# Patient Record
Sex: MALE | Race: OTHER | HISPANIC OR LATINO
[De-identification: names, ages, dates, MRNs, and addresses within clinical notes are randomized per-mention and may not be internally consistent; named-entity substitution may affect disease eponyms.]

---

## 2017-06-06 ENCOUNTER — APPOINTMENT (OUTPATIENT)
Dept: HEART AND VASCULAR | Facility: CLINIC | Age: 47
End: 2017-06-06

## 2017-06-06 VITALS
HEART RATE: 81 BPM | WEIGHT: 214.73 LBS | HEIGHT: 68.9 IN | SYSTOLIC BLOOD PRESSURE: 144 MMHG | BODY MASS INDEX: 31.8 KG/M2 | TEMPERATURE: 98.1 F | OXYGEN SATURATION: 99 % | DIASTOLIC BLOOD PRESSURE: 86 MMHG

## 2017-06-06 DIAGNOSIS — Z86.39 PERSONAL HISTORY OF OTHER ENDOCRINE, NUTRITIONAL AND METABOLIC DISEASE: ICD-10-CM

## 2017-06-06 DIAGNOSIS — Z78.9 OTHER SPECIFIED HEALTH STATUS: ICD-10-CM

## 2017-06-14 ENCOUNTER — APPOINTMENT (OUTPATIENT)
Dept: HEART AND VASCULAR | Facility: CLINIC | Age: 47
End: 2017-06-14

## 2017-06-14 DIAGNOSIS — R94.31 ABNORMAL ELECTROCARDIOGRAM [ECG] [EKG]: ICD-10-CM

## 2017-06-29 ENCOUNTER — FORM ENCOUNTER (OUTPATIENT)
Age: 47
End: 2017-06-29

## 2017-06-30 ENCOUNTER — OUTPATIENT (OUTPATIENT)
Dept: OUTPATIENT SERVICES | Facility: HOSPITAL | Age: 47
LOS: 1 days | End: 2017-06-30
Payer: COMMERCIAL

## 2017-06-30 DIAGNOSIS — R94.31 ABNORMAL ELECTROCARDIOGRAM [ECG] [EKG]: ICD-10-CM

## 2017-06-30 PROCEDURE — 93306 TTE W/DOPPLER COMPLETE: CPT

## 2017-06-30 PROCEDURE — 93306 TTE W/DOPPLER COMPLETE: CPT | Mod: 26

## 2018-08-08 ENCOUNTER — APPOINTMENT (OUTPATIENT)
Dept: NEPHROLOGY | Facility: CLINIC | Age: 48
End: 2018-08-08
Payer: COMMERCIAL

## 2018-08-08 VITALS
WEIGHT: 215 LBS | RESPIRATION RATE: 12 BRPM | BODY MASS INDEX: 31.84 KG/M2 | DIASTOLIC BLOOD PRESSURE: 80 MMHG | SYSTOLIC BLOOD PRESSURE: 145 MMHG | HEART RATE: 80 BPM

## 2018-08-08 PROCEDURE — 99245 OFF/OP CONSLTJ NEW/EST HI 55: CPT

## 2018-09-03 ENCOUNTER — EMERGENCY (EMERGENCY)
Facility: HOSPITAL | Age: 48
LOS: 1 days | Discharge: HOME | End: 2018-09-03
Attending: EMERGENCY MEDICINE | Admitting: EMERGENCY MEDICINE
Payer: COMMERCIAL

## 2018-09-03 VITALS
HEART RATE: 87 BPM | DIASTOLIC BLOOD PRESSURE: 93 MMHG | WEIGHT: 209 LBS | RESPIRATION RATE: 178 BRPM | TEMPERATURE: 99 F | OXYGEN SATURATION: 96 % | SYSTOLIC BLOOD PRESSURE: 132 MMHG

## 2018-09-03 LAB — S PYO AG SPEC QL IA: NEGATIVE — SIGNIFICANT CHANGE UP

## 2018-09-03 PROCEDURE — 99283 EMERGENCY DEPT VISIT LOW MDM: CPT

## 2018-09-03 PROCEDURE — 87081 CULTURE SCREEN ONLY: CPT

## 2018-09-03 PROCEDURE — 82962 GLUCOSE BLOOD TEST: CPT

## 2018-09-03 PROCEDURE — 87880 STREP A ASSAY W/OPTIC: CPT

## 2018-09-03 RX ORDER — IBUPROFEN 200 MG
600 TABLET ORAL ONCE
Qty: 0 | Refills: 0 | Status: COMPLETED | OUTPATIENT
Start: 2018-09-03 | End: 2018-09-03

## 2018-09-03 RX ORDER — DEXAMETHASONE 0.5 MG/5ML
8 ELIXIR ORAL ONCE
Qty: 0 | Refills: 0 | Status: COMPLETED | OUTPATIENT
Start: 2018-09-03 | End: 2018-09-03

## 2018-09-03 RX ORDER — IBUPROFEN 200 MG
1 TABLET ORAL
Qty: 30 | Refills: 0 | OUTPATIENT
Start: 2018-09-03

## 2018-09-03 RX ADMIN — Medication 8 MILLIGRAM(S): at 14:55

## 2018-09-03 RX ADMIN — Medication 100 MILLIGRAM(S): at 14:53

## 2018-09-03 RX ADMIN — Medication 600 MILLIGRAM(S): at 14:55

## 2018-09-03 NOTE — ED PROVIDER NOTE - ENMT, MLM
Airway patent, Nasal mucosa clear. Mouth with normal mucosa. Throat has no vesicles, no oropharyngeal exudates and uvula is midline.  + posterior pharynx erythema.  no lad

## 2018-09-03 NOTE — ED PROVIDER NOTE - OBJECTIVE STATEMENT
here with sore throat, body aches cough for past 5 days.  Went to urgent care and was told it was viral and to rest which he has been doing but not feeling better.  Was not given any prescriptions so hasn't tried anything for symptoms.  Associated with subjective fever/chills.  CXR at urgent care reported as negative

## 2018-09-03 NOTE — ED ADULT NURSE NOTE - NSIMPLEMENTINTERV_GEN_ALL_ED
Implemented All Universal Safety Interventions:  [place] to call system. Call bell, personal items and telephone within reach. Instruct patient to call for assistance. Room bathroom lighting operational. Non-slip footwear when patient is off stretcher. Physically safe environment: no spills, clutter or unnecessary equipment. Stretcher in lowest position, wheels locked, appropriate side rails in place.

## 2018-09-03 NOTE — ED PROVIDER NOTE - MEDICAL DECISION MAKING DETAILS
suspect viral process.  rapid strep neg.  recent cxr neg for pneumonia.  plan symptomatic treatment with ibuprofen/tessalon/decadron.  accucheck wnl making dka unlikely

## 2018-09-03 NOTE — ED ADULT NURSE NOTE - OBJECTIVE STATEMENT
47 y/o male c/o generalized body aches, intermittent cough, sore throat, feeling warm since last week. pt states " my coworker was sick last week and I think I have the same thing". pt reports being at [facility] 2 days ago, x-ray was clear and was told he had a URI, but " he still feel bad".

## 2018-09-05 NOTE — ED POST DISCHARGE NOTE - RESULT SUMMARY
throat cx + strep, discussed with pt reporting difficulty swallowing, persistent throat pain, given rx augmentin

## 2018-09-07 DIAGNOSIS — J02.9 ACUTE PHARYNGITIS, UNSPECIFIED: ICD-10-CM

## 2018-09-07 DIAGNOSIS — Z79.899 OTHER LONG TERM (CURRENT) DRUG THERAPY: ICD-10-CM

## 2018-09-07 DIAGNOSIS — E11.9 TYPE 2 DIABETES MELLITUS WITHOUT COMPLICATIONS: ICD-10-CM

## 2018-09-07 DIAGNOSIS — Z79.82 LONG TERM (CURRENT) USE OF ASPIRIN: ICD-10-CM

## 2018-09-07 DIAGNOSIS — I10 ESSENTIAL (PRIMARY) HYPERTENSION: ICD-10-CM

## 2019-05-06 ENCOUNTER — INPATIENT (INPATIENT)
Facility: HOSPITAL | Age: 49
LOS: 2 days | Discharge: HOME | DRG: 281 | End: 2019-05-09
Attending: INTERNAL MEDICINE | Admitting: INTERNAL MEDICINE
Payer: COMMERCIAL

## 2019-05-06 VITALS
WEIGHT: 205.03 LBS | DIASTOLIC BLOOD PRESSURE: 108 MMHG | SYSTOLIC BLOOD PRESSURE: 219 MMHG | OXYGEN SATURATION: 95 % | RESPIRATION RATE: 18 BRPM | HEART RATE: 83 BPM | TEMPERATURE: 98 F

## 2019-05-06 DIAGNOSIS — E11.9 TYPE 2 DIABETES MELLITUS WITHOUT COMPLICATIONS: ICD-10-CM

## 2019-05-06 DIAGNOSIS — E83.42 HYPOMAGNESEMIA: ICD-10-CM

## 2019-05-06 DIAGNOSIS — E13.9 OTHER SPECIFIED DIABETES MELLITUS WITHOUT COMPLICATIONS: ICD-10-CM

## 2019-05-06 DIAGNOSIS — I16.1 HYPERTENSIVE EMERGENCY: ICD-10-CM

## 2019-05-06 DIAGNOSIS — Z90.49 ACQUIRED ABSENCE OF OTHER SPECIFIED PARTS OF DIGESTIVE TRACT: Chronic | ICD-10-CM

## 2019-05-06 DIAGNOSIS — E78.5 HYPERLIPIDEMIA, UNSPECIFIED: ICD-10-CM

## 2019-05-06 DIAGNOSIS — I21.4 NON-ST ELEVATION (NSTEMI) MYOCARDIAL INFARCTION: ICD-10-CM

## 2019-05-06 DIAGNOSIS — E87.6 HYPOKALEMIA: ICD-10-CM

## 2019-05-06 DIAGNOSIS — N18.3 CHRONIC KIDNEY DISEASE, STAGE 3 (MODERATE): ICD-10-CM

## 2019-05-06 DIAGNOSIS — N17.9 ACUTE KIDNEY FAILURE, UNSPECIFIED: ICD-10-CM

## 2019-05-06 DIAGNOSIS — I10 ESSENTIAL (PRIMARY) HYPERTENSION: ICD-10-CM

## 2019-05-06 LAB
ALBUMIN SERPL ELPH-MCNC: 3.6 G/DL — SIGNIFICANT CHANGE UP (ref 3.3–5)
ALBUMIN SERPL ELPH-MCNC: 3.6 G/DL — SIGNIFICANT CHANGE UP (ref 3.3–5)
ALP SERPL-CCNC: 87 U/L — SIGNIFICANT CHANGE UP (ref 40–120)
ALP SERPL-CCNC: 91 U/L — SIGNIFICANT CHANGE UP (ref 40–120)
ALT FLD-CCNC: 12 U/L — SIGNIFICANT CHANGE UP (ref 10–45)
ALT FLD-CCNC: 13 U/L — SIGNIFICANT CHANGE UP (ref 10–45)
ANION GAP SERPL CALC-SCNC: 12 MMOL/L — SIGNIFICANT CHANGE UP (ref 5–17)
ANION GAP SERPL CALC-SCNC: 13 MMOL/L — SIGNIFICANT CHANGE UP (ref 5–17)
APPEARANCE UR: CLEAR — SIGNIFICANT CHANGE UP
APTT BLD: 29.9 SEC — SIGNIFICANT CHANGE UP (ref 27.5–36.3)
APTT BLD: 58.8 SEC — HIGH (ref 27.5–36.3)
APTT BLD: 65.1 SEC — HIGH (ref 27.5–36.3)
AST SERPL-CCNC: 14 U/L — SIGNIFICANT CHANGE UP (ref 10–40)
AST SERPL-CCNC: 15 U/L — SIGNIFICANT CHANGE UP (ref 10–40)
BASOPHILS # BLD AUTO: 0.05 K/UL — SIGNIFICANT CHANGE UP (ref 0–0.2)
BASOPHILS # BLD AUTO: 0.07 K/UL — SIGNIFICANT CHANGE UP (ref 0–0.2)
BASOPHILS NFR BLD AUTO: 0.7 % — SIGNIFICANT CHANGE UP (ref 0–2)
BASOPHILS NFR BLD AUTO: 0.8 % — SIGNIFICANT CHANGE UP (ref 0–2)
BILIRUB SERPL-MCNC: 0.4 MG/DL — SIGNIFICANT CHANGE UP (ref 0.2–1.2)
BILIRUB SERPL-MCNC: 0.4 MG/DL — SIGNIFICANT CHANGE UP (ref 0.2–1.2)
BILIRUB UR-MCNC: NEGATIVE — SIGNIFICANT CHANGE UP
BUN SERPL-MCNC: 26 MG/DL — HIGH (ref 7–23)
BUN SERPL-MCNC: 26 MG/DL — HIGH (ref 7–23)
CALCIUM SERPL-MCNC: 8.8 MG/DL — SIGNIFICANT CHANGE UP (ref 8.4–10.5)
CALCIUM SERPL-MCNC: 9.1 MG/DL — SIGNIFICANT CHANGE UP (ref 8.4–10.5)
CHLORIDE SERPL-SCNC: 104 MMOL/L — SIGNIFICANT CHANGE UP (ref 96–108)
CHLORIDE SERPL-SCNC: 105 MMOL/L — SIGNIFICANT CHANGE UP (ref 96–108)
CHOLEST SERPL-MCNC: 180 MG/DL — SIGNIFICANT CHANGE UP (ref 10–199)
CK MB CFR SERPL CALC: 5 NG/ML — SIGNIFICANT CHANGE UP (ref 0–6.7)
CK SERPL-CCNC: 259 U/L — HIGH (ref 30–200)
CK SERPL-CCNC: 284 U/L — HIGH (ref 30–200)
CO2 SERPL-SCNC: 28 MMOL/L — SIGNIFICANT CHANGE UP (ref 22–31)
CO2 SERPL-SCNC: 29 MMOL/L — SIGNIFICANT CHANGE UP (ref 22–31)
COLOR SPEC: YELLOW — SIGNIFICANT CHANGE UP
CREAT ?TM UR-MCNC: 47 MG/DL — SIGNIFICANT CHANGE UP
CREAT SERPL-MCNC: 2.18 MG/DL — HIGH (ref 0.5–1.3)
CREAT SERPL-MCNC: 2.21 MG/DL — HIGH (ref 0.5–1.3)
DIFF PNL FLD: ABNORMAL
EOSINOPHIL # BLD AUTO: 0.14 K/UL — SIGNIFICANT CHANGE UP (ref 0–0.5)
EOSINOPHIL # BLD AUTO: 0.15 K/UL — SIGNIFICANT CHANGE UP (ref 0–0.5)
EOSINOPHIL NFR BLD AUTO: 1.7 % — SIGNIFICANT CHANGE UP (ref 0–6)
EOSINOPHIL NFR BLD AUTO: 1.9 % — SIGNIFICANT CHANGE UP (ref 0–6)
GLUCOSE BLDC GLUCOMTR-MCNC: 128 MG/DL — HIGH (ref 70–99)
GLUCOSE BLDC GLUCOMTR-MCNC: 136 MG/DL — HIGH (ref 70–99)
GLUCOSE BLDC GLUCOMTR-MCNC: 182 MG/DL — HIGH (ref 70–99)
GLUCOSE BLDC GLUCOMTR-MCNC: 212 MG/DL — HIGH (ref 70–99)
GLUCOSE SERPL-MCNC: 144 MG/DL — HIGH (ref 70–99)
GLUCOSE SERPL-MCNC: 183 MG/DL — HIGH (ref 70–99)
GLUCOSE UR QL: NEGATIVE — SIGNIFICANT CHANGE UP
HBA1C BLD-MCNC: 7.2 % — HIGH (ref 4–5.6)
HCT VFR BLD CALC: 34.8 % — LOW (ref 39–50)
HCT VFR BLD CALC: 35.3 % — LOW (ref 39–50)
HDLC SERPL-MCNC: 38 MG/DL — LOW
HGB BLD-MCNC: 12 G/DL — LOW (ref 13–17)
HGB BLD-MCNC: 12.3 G/DL — LOW (ref 13–17)
IMM GRANULOCYTES NFR BLD AUTO: 0.2 % — SIGNIFICANT CHANGE UP (ref 0–1.5)
IMM GRANULOCYTES NFR BLD AUTO: 0.3 % — SIGNIFICANT CHANGE UP (ref 0–1.5)
INR BLD: 1 — SIGNIFICANT CHANGE UP (ref 0.88–1.16)
INR BLD: 1.03 — SIGNIFICANT CHANGE UP (ref 0.88–1.16)
KETONES UR-MCNC: NEGATIVE — SIGNIFICANT CHANGE UP
LEUKOCYTE ESTERASE UR-ACNC: NEGATIVE — SIGNIFICANT CHANGE UP
LIPID PNL WITH DIRECT LDL SERPL: 106 MG/DL — SIGNIFICANT CHANGE UP
LYMPHOCYTES # BLD AUTO: 1.34 K/UL — SIGNIFICANT CHANGE UP (ref 1–3.3)
LYMPHOCYTES # BLD AUTO: 18.3 % — SIGNIFICANT CHANGE UP (ref 13–44)
LYMPHOCYTES # BLD AUTO: 2.85 K/UL — SIGNIFICANT CHANGE UP (ref 1–3.3)
LYMPHOCYTES # BLD AUTO: 32 % — SIGNIFICANT CHANGE UP (ref 13–44)
MAGNESIUM SERPL-MCNC: 1.5 MG/DL — LOW (ref 1.6–2.6)
MCHC RBC-ENTMCNC: 30.4 PG — SIGNIFICANT CHANGE UP (ref 27–34)
MCHC RBC-ENTMCNC: 30.5 PG — SIGNIFICANT CHANGE UP (ref 27–34)
MCHC RBC-ENTMCNC: 34.5 GM/DL — SIGNIFICANT CHANGE UP (ref 32–36)
MCHC RBC-ENTMCNC: 34.8 GM/DL — SIGNIFICANT CHANGE UP (ref 32–36)
MCV RBC AUTO: 87.6 FL — SIGNIFICANT CHANGE UP (ref 80–100)
MCV RBC AUTO: 88.1 FL — SIGNIFICANT CHANGE UP (ref 80–100)
MONOCYTES # BLD AUTO: 0.72 K/UL — SIGNIFICANT CHANGE UP (ref 0–0.9)
MONOCYTES # BLD AUTO: 0.78 K/UL — SIGNIFICANT CHANGE UP (ref 0–0.9)
MONOCYTES NFR BLD AUTO: 10.7 % — SIGNIFICANT CHANGE UP (ref 2–14)
MONOCYTES NFR BLD AUTO: 8.1 % — SIGNIFICANT CHANGE UP (ref 2–14)
NEUTROPHILS # BLD AUTO: 4.99 K/UL — SIGNIFICANT CHANGE UP (ref 1.8–7.4)
NEUTROPHILS # BLD AUTO: 5.09 K/UL — SIGNIFICANT CHANGE UP (ref 1.8–7.4)
NEUTROPHILS NFR BLD AUTO: 57.2 % — SIGNIFICANT CHANGE UP (ref 43–77)
NEUTROPHILS NFR BLD AUTO: 68.1 % — SIGNIFICANT CHANGE UP (ref 43–77)
NITRITE UR-MCNC: NEGATIVE — SIGNIFICANT CHANGE UP
NRBC # BLD: 0 /100 WBCS — SIGNIFICANT CHANGE UP (ref 0–0)
NRBC # BLD: 0 /100 WBCS — SIGNIFICANT CHANGE UP (ref 0–0)
NT-PROBNP SERPL-SCNC: 1651 PG/ML — HIGH (ref 0–300)
OSMOLALITY UR: 410 MOSMOL/KG — SIGNIFICANT CHANGE UP (ref 100–650)
PH UR: 7 — SIGNIFICANT CHANGE UP (ref 5–8)
PHOSPHATE SERPL-MCNC: 3.4 MG/DL — SIGNIFICANT CHANGE UP (ref 2.5–4.5)
PLATELET # BLD AUTO: 209 K/UL — SIGNIFICANT CHANGE UP (ref 150–400)
PLATELET # BLD AUTO: 215 K/UL — SIGNIFICANT CHANGE UP (ref 150–400)
POTASSIUM SERPL-MCNC: 3.6 MMOL/L — SIGNIFICANT CHANGE UP (ref 3.5–5.3)
POTASSIUM SERPL-MCNC: 4 MMOL/L — SIGNIFICANT CHANGE UP (ref 3.5–5.3)
POTASSIUM SERPL-SCNC: 3.6 MMOL/L — SIGNIFICANT CHANGE UP (ref 3.5–5.3)
POTASSIUM SERPL-SCNC: 4 MMOL/L — SIGNIFICANT CHANGE UP (ref 3.5–5.3)
PROT ?TM UR-MCNC: 213 MG/DL — HIGH (ref 0–12)
PROT SERPL-MCNC: 6.6 G/DL — SIGNIFICANT CHANGE UP (ref 6–8.3)
PROT SERPL-MCNC: 6.7 G/DL — SIGNIFICANT CHANGE UP (ref 6–8.3)
PROT UR-MCNC: 100 MG/DL
PROTHROM AB SERPL-ACNC: 11.3 SEC — SIGNIFICANT CHANGE UP (ref 10–12.9)
PROTHROM AB SERPL-ACNC: 11.7 SEC — SIGNIFICANT CHANGE UP (ref 10–12.9)
RBC # BLD: 3.95 M/UL — LOW (ref 4.2–5.8)
RBC # BLD: 4.03 M/UL — LOW (ref 4.2–5.8)
RBC # FLD: 12.4 % — SIGNIFICANT CHANGE UP (ref 10.3–14.5)
RBC # FLD: 12.5 % — SIGNIFICANT CHANGE UP (ref 10.3–14.5)
SODIUM SERPL-SCNC: 145 MMOL/L — SIGNIFICANT CHANGE UP (ref 135–145)
SODIUM SERPL-SCNC: 146 MMOL/L — HIGH (ref 135–145)
SODIUM UR-SCNC: 144 MMOL/L — SIGNIFICANT CHANGE UP
SP GR SPEC: 1.02 — SIGNIFICANT CHANGE UP (ref 1–1.03)
TOTAL CHOLESTEROL/HDL RATIO MEASUREMENT: 4.7 RATIO — SIGNIFICANT CHANGE UP (ref 3.4–9.6)
TRIGL SERPL-MCNC: 178 MG/DL — HIGH (ref 10–149)
TROPONIN T SERPL-MCNC: 0.05 NG/ML — CRITICAL HIGH (ref 0–0.01)
TROPONIN T SERPL-MCNC: 0.06 NG/ML — CRITICAL HIGH (ref 0–0.01)
UROBILINOGEN FLD QL: 0.2 E.U./DL — SIGNIFICANT CHANGE UP
UUN UR-MCNC: 300 MG/DL — SIGNIFICANT CHANGE UP
WBC # BLD: 7.32 K/UL — SIGNIFICANT CHANGE UP (ref 3.8–10.5)
WBC # BLD: 8.9 K/UL — SIGNIFICANT CHANGE UP (ref 3.8–10.5)
WBC # FLD AUTO: 7.32 K/UL — SIGNIFICANT CHANGE UP (ref 3.8–10.5)
WBC # FLD AUTO: 8.9 K/UL — SIGNIFICANT CHANGE UP (ref 3.8–10.5)

## 2019-05-06 PROCEDURE — 93010 ELECTROCARDIOGRAM REPORT: CPT

## 2019-05-06 PROCEDURE — 99291 CRITICAL CARE FIRST HOUR: CPT

## 2019-05-06 PROCEDURE — 99223 1ST HOSP IP/OBS HIGH 75: CPT | Mod: AI

## 2019-05-06 PROCEDURE — 71045 X-RAY EXAM CHEST 1 VIEW: CPT | Mod: 26

## 2019-05-06 RX ORDER — TRIAMTERENE/HYDROCHLOROTHIAZID 75 MG-50MG
1 TABLET ORAL DAILY
Qty: 0 | Refills: 0 | Status: DISCONTINUED | OUTPATIENT
Start: 2019-05-06 | End: 2019-05-06

## 2019-05-06 RX ORDER — LABETALOL HCL 100 MG
10 TABLET ORAL ONCE
Qty: 0 | Refills: 0 | Status: COMPLETED | OUTPATIENT
Start: 2019-05-06 | End: 2019-05-06

## 2019-05-06 RX ORDER — NITROGLYCERIN 6.5 MG
10 CAPSULE, EXTENDED RELEASE ORAL
Qty: 50 | Refills: 0 | Status: DISCONTINUED | OUTPATIENT
Start: 2019-05-06 | End: 2019-05-06

## 2019-05-06 RX ORDER — VALSARTAN 80 MG/1
0 TABLET ORAL
Qty: 0 | Refills: 0 | COMMUNITY

## 2019-05-06 RX ORDER — METOPROLOL TARTRATE 50 MG
5 TABLET ORAL ONCE
Qty: 0 | Refills: 0 | Status: COMPLETED | OUTPATIENT
Start: 2019-05-06 | End: 2019-05-06

## 2019-05-06 RX ORDER — DEXTROSE 50 % IN WATER 50 %
15 SYRINGE (ML) INTRAVENOUS ONCE
Qty: 0 | Refills: 0 | Status: DISCONTINUED | OUTPATIENT
Start: 2019-05-06 | End: 2019-05-09

## 2019-05-06 RX ORDER — HYDRALAZINE HCL 50 MG
10 TABLET ORAL ONCE
Qty: 0 | Refills: 0 | Status: COMPLETED | OUTPATIENT
Start: 2019-05-06 | End: 2019-05-06

## 2019-05-06 RX ORDER — HEPARIN SODIUM 5000 [USP'U]/ML
5600 INJECTION INTRAVENOUS; SUBCUTANEOUS EVERY 6 HOURS
Qty: 0 | Refills: 0 | Status: DISCONTINUED | OUTPATIENT
Start: 2019-05-06 | End: 2019-05-09

## 2019-05-06 RX ORDER — AMLODIPINE BESYLATE 2.5 MG/1
0 TABLET ORAL
Qty: 0 | Refills: 0 | COMMUNITY

## 2019-05-06 RX ORDER — SODIUM CHLORIDE 9 MG/ML
1000 INJECTION, SOLUTION INTRAVENOUS
Qty: 0 | Refills: 0 | Status: DISCONTINUED | OUTPATIENT
Start: 2019-05-06 | End: 2019-05-09

## 2019-05-06 RX ORDER — HEPARIN SODIUM 5000 [USP'U]/ML
INJECTION INTRAVENOUS; SUBCUTANEOUS
Qty: 25000 | Refills: 0 | Status: DISCONTINUED | OUTPATIENT
Start: 2019-05-06 | End: 2019-05-07

## 2019-05-06 RX ORDER — DEXTROSE 50 % IN WATER 50 %
25 SYRINGE (ML) INTRAVENOUS ONCE
Qty: 0 | Refills: 0 | Status: DISCONTINUED | OUTPATIENT
Start: 2019-05-06 | End: 2019-05-09

## 2019-05-06 RX ORDER — AMLODIPINE BESYLATE 2.5 MG/1
5 TABLET ORAL DAILY
Qty: 0 | Refills: 0 | Status: DISCONTINUED | OUTPATIENT
Start: 2019-05-06 | End: 2019-05-06

## 2019-05-06 RX ORDER — ISOSORBIDE MONONITRATE 60 MG/1
60 TABLET, EXTENDED RELEASE ORAL DAILY
Qty: 0 | Refills: 0 | Status: DISCONTINUED | OUTPATIENT
Start: 2019-05-06 | End: 2019-05-07

## 2019-05-06 RX ORDER — ASPIRIN/CALCIUM CARB/MAGNESIUM 324 MG
325 TABLET ORAL ONCE
Qty: 0 | Refills: 0 | Status: COMPLETED | OUTPATIENT
Start: 2019-05-06 | End: 2019-05-06

## 2019-05-06 RX ORDER — INSULIN LISPRO 100/ML
VIAL (ML) SUBCUTANEOUS
Qty: 0 | Refills: 0 | Status: DISCONTINUED | OUTPATIENT
Start: 2019-05-06 | End: 2019-05-09

## 2019-05-06 RX ORDER — NITROGLYCERIN 6.5 MG
0.4 CAPSULE, EXTENDED RELEASE ORAL ONCE
Qty: 0 | Refills: 0 | Status: COMPLETED | OUTPATIENT
Start: 2019-05-06 | End: 2019-05-06

## 2019-05-06 RX ORDER — ATORVASTATIN CALCIUM 80 MG/1
80 TABLET, FILM COATED ORAL AT BEDTIME
Qty: 0 | Refills: 0 | Status: DISCONTINUED | OUTPATIENT
Start: 2019-05-06 | End: 2019-05-09

## 2019-05-06 RX ORDER — ASPIRIN/CALCIUM CARB/MAGNESIUM 324 MG
81 TABLET ORAL DAILY
Qty: 0 | Refills: 0 | Status: DISCONTINUED | OUTPATIENT
Start: 2019-05-07 | End: 2019-05-09

## 2019-05-06 RX ORDER — ASPIRIN/CALCIUM CARB/MAGNESIUM 324 MG
1 TABLET ORAL
Qty: 0 | Refills: 0 | COMMUNITY

## 2019-05-06 RX ORDER — ATORVASTATIN CALCIUM 80 MG/1
20 TABLET, FILM COATED ORAL AT BEDTIME
Qty: 0 | Refills: 0 | Status: DISCONTINUED | OUTPATIENT
Start: 2019-05-06 | End: 2019-05-06

## 2019-05-06 RX ORDER — ASPIRIN/CALCIUM CARB/MAGNESIUM 324 MG
81 TABLET ORAL DAILY
Qty: 0 | Refills: 0 | Status: DISCONTINUED | OUTPATIENT
Start: 2019-05-06 | End: 2019-05-06

## 2019-05-06 RX ORDER — HEPARIN SODIUM 5000 [USP'U]/ML
5000 INJECTION INTRAVENOUS; SUBCUTANEOUS ONCE
Qty: 0 | Refills: 0 | Status: COMPLETED | OUTPATIENT
Start: 2019-05-06 | End: 2019-05-06

## 2019-05-06 RX ORDER — AMLODIPINE BESYLATE 2.5 MG/1
10 TABLET ORAL DAILY
Qty: 0 | Refills: 0 | Status: DISCONTINUED | OUTPATIENT
Start: 2019-05-07 | End: 2019-05-09

## 2019-05-06 RX ORDER — MAGNESIUM SULFATE 500 MG/ML
2 VIAL (ML) INJECTION EVERY 4 HOURS
Qty: 0 | Refills: 0 | Status: COMPLETED | OUTPATIENT
Start: 2019-05-06 | End: 2019-05-06

## 2019-05-06 RX ORDER — ATORVASTATIN CALCIUM 80 MG/1
0 TABLET, FILM COATED ORAL
Qty: 0 | Refills: 0 | COMMUNITY

## 2019-05-06 RX ORDER — METOPROLOL TARTRATE 50 MG
25 TABLET ORAL
Qty: 0 | Refills: 0 | Status: DISCONTINUED | OUTPATIENT
Start: 2019-05-06 | End: 2019-05-08

## 2019-05-06 RX ORDER — NEBIVOLOL HYDROCHLORIDE 5 MG/1
0 TABLET ORAL
Qty: 0 | Refills: 0 | COMMUNITY

## 2019-05-06 RX ORDER — DEXTROSE 50 % IN WATER 50 %
12.5 SYRINGE (ML) INTRAVENOUS ONCE
Qty: 0 | Refills: 0 | Status: DISCONTINUED | OUTPATIENT
Start: 2019-05-06 | End: 2019-05-09

## 2019-05-06 RX ORDER — POTASSIUM CHLORIDE 20 MEQ
40 PACKET (EA) ORAL ONCE
Qty: 0 | Refills: 0 | Status: COMPLETED | OUTPATIENT
Start: 2019-05-06 | End: 2019-05-06

## 2019-05-06 RX ORDER — GLUCAGON INJECTION, SOLUTION 0.5 MG/.1ML
1 INJECTION, SOLUTION SUBCUTANEOUS ONCE
Qty: 0 | Refills: 0 | Status: DISCONTINUED | OUTPATIENT
Start: 2019-05-06 | End: 2019-05-09

## 2019-05-06 RX ORDER — ACETAMINOPHEN 500 MG
650 TABLET ORAL ONCE
Qty: 0 | Refills: 0 | Status: COMPLETED | OUTPATIENT
Start: 2019-05-06 | End: 2019-05-06

## 2019-05-06 RX ORDER — AMLODIPINE BESYLATE 2.5 MG/1
5 TABLET ORAL ONCE
Qty: 0 | Refills: 0 | Status: COMPLETED | OUTPATIENT
Start: 2019-05-06 | End: 2019-05-06

## 2019-05-06 RX ADMIN — Medication 2: at 11:48

## 2019-05-06 RX ADMIN — Medication 10 MILLIGRAM(S): at 07:02

## 2019-05-06 RX ADMIN — Medication 325 MILLIGRAM(S): at 03:13

## 2019-05-06 RX ADMIN — HEPARIN SODIUM 1000 UNIT(S)/HR: 5000 INJECTION INTRAVENOUS; SUBCUTANEOUS at 03:39

## 2019-05-06 RX ADMIN — Medication 5 MILLIGRAM(S): at 03:18

## 2019-05-06 RX ADMIN — Medication 50 GRAM(S): at 14:29

## 2019-05-06 RX ADMIN — Medication 650 MILLIGRAM(S): at 19:33

## 2019-05-06 RX ADMIN — ATORVASTATIN CALCIUM 80 MILLIGRAM(S): 80 TABLET, FILM COATED ORAL at 22:10

## 2019-05-06 RX ADMIN — Medication 25 MILLIGRAM(S): at 05:50

## 2019-05-06 RX ADMIN — Medication 50 GRAM(S): at 09:45

## 2019-05-06 RX ADMIN — Medication 40 MILLIEQUIVALENT(S): at 09:46

## 2019-05-06 RX ADMIN — Medication 10 MILLIGRAM(S): at 05:50

## 2019-05-06 RX ADMIN — HEPARIN SODIUM 1000 UNIT(S)/HR: 5000 INJECTION INTRAVENOUS; SUBCUTANEOUS at 10:11

## 2019-05-06 RX ADMIN — Medication 25 MILLIGRAM(S): at 19:34

## 2019-05-06 RX ADMIN — AMLODIPINE BESYLATE 5 MILLIGRAM(S): 2.5 TABLET ORAL at 09:19

## 2019-05-06 RX ADMIN — ISOSORBIDE MONONITRATE 60 MILLIGRAM(S): 60 TABLET, EXTENDED RELEASE ORAL at 11:48

## 2019-05-06 RX ADMIN — Medication 3 MICROGRAM(S)/MIN: at 07:30

## 2019-05-06 RX ADMIN — Medication 0.4 MILLIGRAM(S): at 03:13

## 2019-05-06 RX ADMIN — Medication 4: at 22:08

## 2019-05-06 RX ADMIN — Medication 10 MILLIGRAM(S): at 03:59

## 2019-05-06 RX ADMIN — AMLODIPINE BESYLATE 5 MILLIGRAM(S): 2.5 TABLET ORAL at 07:29

## 2019-05-06 RX ADMIN — HEPARIN SODIUM 5000 UNIT(S): 5000 INJECTION INTRAVENOUS; SUBCUTANEOUS at 03:39

## 2019-05-06 RX ADMIN — Medication 650 MILLIGRAM(S): at 21:36

## 2019-05-06 NOTE — CONSULT NOTE ADULT - SUBJECTIVE AND OBJECTIVE BOX
WILIAN HADLEY      Patient is a 48y old  Male who presents with a chief complaint of Intermittent, non exertional, mild chest pressure with no associated symptoms this evening.  In ER pt. noted to be HTN Emergency 212/108 with Troponin 0.06. (06 May 2019 10:55)      HPI:  47 y/o male with PMHx of HTN, HPL, renal insufficiency (on admission Creatinine 2.21 no other labs to compare; pt. denies hx of CKD) and DM-2 present to [facility] ER this morning, 19, complaining of intermittent, non exertional, mild chest pressure associated with no symptoms last night 11PM.   In ER Pt.'s BP noted to be 212/108 with elevated Troponin 0.06; pt. reports  not taking his medication on Saturday; otherwise he is compliant with his BP medication.       According to patient, he was sitting in his chair watching television around 11PM when he suddenly felt mild chest pressure, 5/10 lasting 2-3 minutes with no associated symptoms.  He was concerned because he never experienced chest pressure before and the feeling took his breath away.    Pt. states he had stress test one year ago which was normal and denies any cardiac history including HF.  Pt. also denies family hx of cardiac disease.       In ER ECG reveals NSR@71bpm with non specific ST-T wave changes, Troponin 0.06, , BNP 1651, BUN/Creatinine 26/2.21, Blood Glucose 183 and H/H 12.0/34.8.  CXR reveals no acute pathology f/u official report.  BP noted to be 212/108; pt. was given Labetalol 10mg IV X1 reducing 's an additional Lopressor 5mg IV push given.  Pt. ruled in NSTEMI in setting HTN Emergency Heparin gtt started and given Aspirin Ec 325mg PO X1.    In light of patient ruling in NSTEMI in setting HTN Emergency, hx and symptoms pt. admitted to [facility] for telemetry, ECG, serial CE, Echocardiogram, NPO for further cardiac workup to rule out ACS and HTN management.  Holding pt.'s Triamterene/HCTz (37.5-25mg) in setting of elevated CK  2.21.  pt. denies hx of CKD.  Discuss further with Dr. Chicas (06 May 2019 05:31)      Addl  Medical issues:       HEALTH ISSUES - PROBLEM Dx:  Chronic kidney disease (CKD), stage III (moderate): Chronic kidney disease (CKD), stage III (moderate)  Hypomagnesemia: Hypomagnesemia  Hypokalemia: Hypokalemia  Hyperlipidemia: Hyperlipidemia  Diabetes: Diabetes  Hypertension: Hypertension  NSTEMI (non-ST elevated myocardial infarction): NSTEMI (non-ST elevated myocardial infarction)  Acute kidney injury: Acute kidney injury  Hypertensive emergency: Hypertensive emergency            MEDICATIONS  (STANDING):  atorvastatin 80 milliGRAM(s) Oral at bedtime  dextrose 5%. 1000 milliLiter(s) (50 mL/Hr) IV Continuous <Continuous>  dextrose 50% Injectable 12.5 Gram(s) IV Push once  dextrose 50% Injectable 25 Gram(s) IV Push once  dextrose 50% Injectable 25 Gram(s) IV Push once  heparin  Infusion.  Unit(s)/Hr (10 mL/Hr) IV Continuous <Continuous>  insulin lispro (HumaLOG) corrective regimen sliding scale   SubCutaneous Before meals and at bedtime  isosorbide   mononitrate ER Tablet (IMDUR) 60 milliGRAM(s) Oral daily  metoprolol tartrate 25 milliGRAM(s) Oral two times a day    MEDICATIONS  (PRN):  dextrose 40% Gel 15 Gram(s) Oral once PRN Blood Glucose LESS THAN 70 milliGRAM(s)/deciliter  glucagon  Injectable 1 milliGRAM(s) IntraMuscular once PRN Glucose LESS THAN 70 milligrams/deciliter  heparin  Injectable 5600 Unit(s) IV Push every 6 hours PRN For aPTT less than 40          PAST MEDICAL & SURGICAL HISTORY:  Hyperlipidemia  Diabetes  Hypertension  History of cholecystectomy      REVIEW OF SYSTEMS:  [x] As per HPI          Reviewed   no change                            Changes noted  CONSTITUTIONAL: No fever, weight loss, or fatigue  RESPIRATORY: No cough, wheezing, chills or hemoptysis; No Shortness of Breath  CARDIOVASCULAR: palpitations, dizziness, or leg swelling--CP  GASTROINTESTINAL: No abdominal or epigastric pain. No nausea, vomiting, or hematemesis; No diarrhea or constipation. No melena or hematochezia.  MUSCULOSKELETAL: No joint pain or swelling; No muscle, back, or extremity pain  Neuro:   Grossly  Negative  Psych        Awake  alert  [x] All others negative	  [ ] Unable to obtain      Vital Signs Last 24 Hrs  T(C): 36.7 (06 May 2019 20:55), Max: 36.7 (06 May 2019 01:38)  T(F): 98 (06 May 2019 20:55), Max: 98.1 (06 May 2019 01:38)  HR: 76 (06 May 2019 20:22) (71 - 85)  BP: 164/88 (06 May 2019 20:22) (163/88 - 219/108)  BP(mean): --  RR: 18 (06 May 2019 20:22) (18 - 18)  SpO2: 97% (06 May 2019 20:22) (95% - 98%)    PHYSICAL EXAM:    PHYSICAL EXAM:      Constitutional: NAD, well-groomed, well-developed  HEENT: PERRLA, EOMI, Normal Hearing, MMM  Neck: No LAD, No JVD  Back: Normal spine flexure, No CVA tenderness  Respiratory: CTABCardiovascular: S1 and S2, RRR, no M/G/R  Gastrointestinal: BS+, soft, NT/ND  Extremities: No peripheral edema  Vascular: 2+ peripheral pulses  Neurological: A/O x 3, no focal deficits  Psychiatric: Normal mood, normal affect  Musculoskeletal: 5/5 strength b/l upper and lower extremities  Skin: No rashes                                          12.3   8.90  )-----------( 215      ( 06 May 2019 07:01 )             35.3     05-06    145  |  104  |  26<H>  ----------------------------<  144<H>  3.6   |  29  |  2.18<H>    Ca    9.1      06 May 2019 07:01  Phos  3.4     05-06  Mg     1.5     05-06    TPro  6.6  /  Alb  3.6  /  TBili  0.4  /  DBili  <0.2  /  AST  14  /  ALT  12  /  AlkPhos  91  05-06    CARDIAC MARKERS ( 06 May 2019 07:01 )  x     / 0.05 ng/mL / 259 U/L / x     / 5.0 ng/mL  CARDIAC MARKERS ( 06 May 2019 02:39 )  x     / 0.06 ng/mL / 284 U/L / x     / x          PT/INR - ( 06 May 2019 07:01 )   PT: 11.7 sec;   INR: 1.03          PTT - ( 06 May 2019 09:34 )  PTT:58.8 sec  CAPILLARY BLOOD GLUCOSE      POCT Blood Glucose.: 212 mg/dL (06 May 2019 21:50)  POCT Blood Glucose.: 128 mg/dL (06 May 2019 16:14)  POCT Blood Glucose.: 182 mg/dL (06 May 2019 10:57)  POCT Blood Glucose.: 136 mg/dL (06 May 2019 06:53)    Urinalysis Basic - ( 06 May 2019 08:59 )    Color: Yellow / Appearance: Clear / S.020 / pH: x  Gluc: x / Ketone: NEGATIVE  / Bili: Negative / Urobili: 0.2 E.U./dL   Blood: x / Protein: 100 mg/dL / Nitrite: NEGATIVE   Leuk Esterase: NEGATIVE / RBC: > 10 /HPF / WBC < 5 /HPF   Sq Epi: x / Non Sq Epi: 0-5 /HPF / Bacteria: None /HPF      I&O's Summary    05 May 2019 07:  -  06 May 2019 07:00  --------------------------------------------------------  IN: 120 mL / OUT: 200 mL / NET: -80 mL    06 May 2019 07:01  -  06 May 2019 22:35  --------------------------------------------------------  IN: 267 mL / OUT: 860 mL / NET: -593 mL            ASSESSMENT/PLAN/RECOMMENDATIONS

## 2019-05-06 NOTE — H&P ADULT - PROBLEM SELECTOR PLAN 3
Troponin 0.06 with mild cp  Telemetry, ECG, serial CE, Echocardiogram and NPO for further workup to rule out ACS.  In setting HTN Emergency

## 2019-05-06 NOTE — H&P ADULT - ASSESSMENT
49 y/o male with PMHx of HTN, HPL, renal insufficiency (on admission Creatinine 2.21 no other labs to compare; pt. denies hx of CKD) and DM-2 present to [facility] ER this morning, 5/6/19, complaining of intermittent, non exertional, mild chest pressure associated with no symptoms last night 11PM.   In ER Pt.'s BP noted to be 212/108 with elevated Troponin 0.06; pt. reports  not taking his medication on Saturday; otherwise he is compliant with his BP medication.

## 2019-05-06 NOTE — ED ADULT TRIAGE NOTE - CHIEF COMPLAINT QUOTE
pt complaining of generalized chest discomfort denies pain keeping him out of his sleep tonight no N/V SOB. Pt noted to be hypertensive in triage states he took all his meds and denies head ache blurry vision

## 2019-05-06 NOTE — H&P ADULT - NSHPPHYSICALEXAM_GEN_ALL_CORE
T(C): 36.3 (05-06-19 @ 05:06), Max: 36.7 (05-06-19 @ 01:38)  HR: 81 (05-06-19 @ 04:40) (71 - 83)  BP: 188/92 (05-06-19 @ 04:40) (188/92 - 219/108)  RR: 18 (05-06-19 @ 04:40) (18 - 18)  SpO2: 98% (05-06-19 @ 04:40) (95% - 98%)  Wt(kg): --    Appearance: Normal	  HEENT:   Normal oral mucosa, PERRL, EOMI	  Neck: Supple,  - JVD; No Carotid Bruit and 2+ pulses B/L  Cardiovascular: Normal S1 S2, No JVD, No murmurs  Respiratory: Lungs clear to auscultation//No Rales, Rhonchi, Wheezing	  Gastrointestinal:  Soft, Non-tender, + BS	  Skin: No rashes, No ecchymoses, No cyanosis  Extremities: Normal range of motion, No clubbing, cyanosis or edema  Vascular: Femoral pulses 2+ b/l without bruit, DP 1+ b/l, PT 1+ b/l  Neurologic: Non-focal  Psychiatry: A & O x 3, Mood & affect appropriate

## 2019-05-06 NOTE — H&P ADULT - NSHPREVIEWOFSYSTEMS_GEN_ALL_CORE
GENERAL, CONSTITUTIONAL : denies recent weight loss, fever, chills  EYES, VISION: denies changes in vision   EARS, NOSE, THROAT: denies hearing loss  HEART, CARDIOVASCULAR: mild  chest pressure,denies arrhythmia, palpitations,   RESPIRATORY: Denies cough, SOB, wheezing, PND, orthopnea  GASTROINTESTINAL: Denies abdominal pain, heartburn, bloody stool, dark tarry stool  GENITOURINARY: Denies frequent urination, urgency  MUSCULOSKELETAL denies joint pain or swelling, restricted motion, musculoskeletal pain.   SKIN & INTEGUMENTARY Denies rashes, sores, blisters, blisters, growths.  NEUROLOGICAL: Denies numbness or tingling sensations, sensation loss, burning.   PSYCHIATRIC: Denies nervousness, anxiety, depression  ENDOCRINE Denies heat or cold intolerance, excessive thirst  HEMATOLOGIC/LYMPHATIC: Denies abnormal bleeding, bleeding of any kind

## 2019-05-06 NOTE — PROGRESS NOTE ADULT - ASSESSMENT
47 y/o male with PMHx of HTN, HPL, renal insufficiency (on admission Creatinine 2.21 no other labs to compare; pt. denies hx of CKD) and DM-2 who presented to [facility] ED 5/6/19 c/o C/P and R/I NSTEMI in the setting of Hypertensive Emergency.

## 2019-05-06 NOTE — H&P ADULT - REASON FOR ADMISSION
Intermittent, non exertional, mild chest pressure with no associated symptoms this evening.  In ER pt. noted to be HTN Urgency 212/108 with Troponin 0.06. Intermittent, non exertional, mild chest pressure with no associated symptoms this evening.  In ER pt. noted to be HTN Emergency 212/108 with Troponin 0.06.

## 2019-05-06 NOTE — PROGRESS NOTE ADULT - PROBLEM SELECTOR PLAN 5
. Triglycerides 178. Total Cholesterol-186. LDL 38.  Lipitor changed to 80 mg in the setting of NSTEMI.

## 2019-05-06 NOTE — PROGRESS NOTE ADULT - PROBLEM SELECTOR PLAN 3
Peak Troponin 0.06 trending down.   Patient currently C/P free and HD stable.  -Continue Heparin drip.   -Continue Metoprolol.   -Lipitor 80 mg daily.  -Echocardiogram ordered. Follow-up results Peak Troponin 0.06 trending down.   Patient currently C/P free and HD stable.  -Continue Heparin drip.   -Continue Metoprolol.   -Lipitor 80 mg daily.  -Echocardiogram ordered. Follow-up results. Possible ischemic work-up inpatient depending on BP control and echo results. Outpatient labs obtained Cr 1.79 in 5/2018 Cr 2.42 in 1/2019.   Cr 2.21 on admission trending down to 2.1- which appears to be baseline   -Avoid Nephrotoxic Agents   -Follow-up Urine Electrolytes   -U/A negative for UTI. U/A+ Blood and Protein  -Follow-up Urine Electrolytes.

## 2019-05-06 NOTE — ED ADULT NURSE NOTE - NSIMPLEMENTINTERV_GEN_ALL_ED
Implemented All Universal Safety Interventions:  [place] to call system. Call bell, personal items and telephone within reach. Instruct patient to call for assistance. Room bathroom lighting operational. Non-slip footwear when patient is off stretcher. Physically safe environment: no spills, clutter or unnecessary equipment. Stretcher in lowest position, wheels locked, appropriate side rails in place.

## 2019-05-06 NOTE — ED PROVIDER NOTE - CRITICAL CARE PROVIDED
consult w/ pt's family directly relating to pts condition/additional history taking/documentation/direct patient care (not related to procedure)/interpretation of diagnostic studies/consultation with other physicians

## 2019-05-06 NOTE — H&P ADULT - PROBLEM SELECTOR PLAN 5
Monitor FS; F/U Hgb A1C   Pt. takes Januumet 50-1000mg PO daily hold for now.  Insulin Lispro Med dose sliding scale.

## 2019-05-06 NOTE — PROGRESS NOTE ADULT - PROBLEM SELECTOR PLAN 2
BUN/Creatinine 26/2.21 no labs to compare to; pt. denies hx of CKD  Hold nephrotoxic agents  -Will try to obtain prior labs from PMD office  -U/A negative for UTI. U/A+ Blood and Protein BUN/Creatinine 26/2.21 no labs to compare to; pt. denies hx of CKD  Hold nephrotoxic agents  -Will try to obtain prior labs from PMD office  -U/A negative for UTI. U/A+ Blood and Protein  -Follow-up Urine Electrolytes. Peak Troponin 0.06 trending down.   Patient currently C/P free and HD stable.  -Continue Heparin drip.   -Continue Metoprolol.   -Lipitor 80 mg daily.  -Echocardiogram ordered. Follow-up results. Possible ischemic work-up inpatient depending on BP control and echo results.

## 2019-05-06 NOTE — ED PROVIDER NOTE - OBJECTIVE STATEMENT
48M with a h/o HTN, high cholesterol, DM2 who p/w substernal chest "discomfort" that started today around 11pm. He couldn't fall asleep due to his sx. Denies associated sx, no radiation. He admits he did not take his meds yesterday. CP currently 1-2/10. He did not take anything for his sx. Denies drug use.

## 2019-05-06 NOTE — ED PROVIDER NOTE - CLINICAL SUMMARY MEDICAL DECISION MAKING FREE TEXT BOX
48M with above PMHX who p/w CP, noted to be v hypertensive, EKg NSR with TWI laterally, no stemi. Asa, nitro and lopressor given, labs reveal trop of 0.06 and probnp 1651 and cr 2.21 (unknown baseline but pt denies kidney issues in the past). Heparin ordered for NSTEMI and pt to be admitted to 5U for further mgmt of nstemi/HTN've urgency.

## 2019-05-06 NOTE — ED PROVIDER NOTE - CARE PLAN
Principal Discharge DX:	NSTEMI (non-ST elevated myocardial infarction)  Secondary Diagnosis:	Hypertensive emergency Principal Discharge DX:	NSTEMI (non-ST elevated myocardial infarction)  Secondary Diagnosis:	Hypertensive emergency  Secondary Diagnosis:	Acute kidney injury

## 2019-05-06 NOTE — ED PROVIDER NOTE - PHYSICAL EXAMINATION
GEN: Well appearing, well nourished, awake, alert, oriented to person, place, time/situation and in no apparent distress.  ENT: Airway patent, Nasal mucosa clear. Mouth with normal mucosa.  EYES: Clear bilaterally.  RESPIRATORY: Breathing comfortably with normal RR.  CARDIAC: Regular rate and rhythm  ABDOMEN: Soft, nontender, +bowel sounds, no rebound, rigidity, or guarding.  MSK: Range of motion is not limited, no deformities noted.  NEURO: Alert and oriented x 3. Cn 2-12 intact. Strength 5/5 and sensation intact in all 4 extremities. no pronator drift. Gait normal.   SKIN: Skin normal color for race, warm, dry and intact. No evidence of rash.  PSYCH: Alert and oriented to person, place, time/situation. normal mood and affect. no apparent risk to self or others.

## 2019-05-06 NOTE — ED PROVIDER NOTE - DIAGNOSTIC INTERPRETATION
ER Physician: Concepción Myles   CHEST XRAY INTERPRETATION: lungs clear, heart shadow normal, bony structures intact

## 2019-05-06 NOTE — PROGRESS NOTE ADULT - PROBLEM SELECTOR PLAN 1
Patient currently asymptomatic.  's/100s -Nitro drip initiated with goal to titrate to .  -Amlodipine 5 mg given with additional 5 mg -Amlodipine 10 mg daily starting 5/7/19.  -Continue Metoprolol  -Will initiate Imdur and Hydralazine if needed.  -Holding Triamterene/HCTZ in the setting of elevated Cr. 's/100s -Nitro drip initiated with goal to titrate to .  -Amlodipine 5 mg given with additional 5 mg -Amlodipine 10 mg daily starting 5/7/19.  -Continue Metoprolol  -Will initiate Imdur and Hydralazine if needed.  -Holding Triamterene/HCTZ in the setting of elevated Cr and possible cath.

## 2019-05-06 NOTE — H&P ADULT - HISTORY OF PRESENT ILLNESS
49 y/o male with PMHx of HTN, HPL, renal insufficiency (on admission Creatinine 2.21 no other labs to compare; pt. denies hx of CKD) and DM-2 present to [facility] ER this morning, 5/6/19, complaining of intermittent, non exertional, mild chest pressure associated with no symptoms last night 11PM.   In ER Pt.'s BP noted to be 212/108 with elevated Troponin 0.06; pt. reports  not taking his medication on Saturday; otherwise he is compliant with his BP medication.       According to patient, he was sitting in his chair watching television around 11PM when he suddenly felt mild chest pressure, 5/10 lasting 2-3 minutes with no associated symptoms.  He was concerned because he never experienced chest pressure before and the feeling took his breath away.    Pt. states he had stress test one year ago which was normal and denies any cardiac history including HF.  Pt. also denies family hx of cardiac disease.       In ER ECG reveals NSR@71bpm with non specific ST-T wave changes, Troponin 0.06, , BNP 1651, BUN/Creatinine 26/2.21, Blood Glucose 183 and H/H 12.0/34.8.  CXR reveals no acute pathology f/u official report.  BP noted to be 212/108; pt. was given Labetalol 10mg IV X1 reducing 's an additional Lopressor 5mg IV push given.  Pt. ruled in NSTEMI in setting HTN Emergency Heparin gtt started and given Aspirin Ec 325mg PO X1.    In light of patient ruling in NSTEMI in setting HTN Emergency, hx and symptoms pt. admitted to [facility] for telemetry, ECG, serial CE, Echocardiogram, NPO for further cardiac workup to rule out ACS and HTN management.  Holding pt.'s Triamterene/HCTz (37.5-25mg) in setting of elevated CK  2.21.  pt. denies hx of CKD.  Discuss further with Dr. Chicas

## 2019-05-06 NOTE — PROGRESS NOTE ADULT - SUBJECTIVE AND OBJECTIVE BOX
Interventional Cardiology PA Adult Progress Note    CC: Hypertensive Emergency, NSTEMI    Subjective Assessment: Patient seen and examined at bedside. Patient denies C/P, SOB, palpitations, dizziness, diaphoresis, N/V, paresthesias of arms or legs, facial droop or facial numbness, changes in speech, H/A, visual changes.    ROS Otherwise negative unless otherwise stated except per Subjective  	  MEDICATIONS:  metoprolol tartrate 25 milliGRAM(s) Oral two times a day  nitroglycerin  Infusion 10 MICROgram(s)/Min IV Continuous <Continuous>  atorvastatin 20 milliGRAM(s) Oral at bedtime  dextrose 40% Gel 15 Gram(s) Oral once PRN  dextrose 50% Injectable 12.5 Gram(s) IV Push once  dextrose 50% Injectable 25 Gram(s) IV Push once  dextrose 50% Injectable 25 Gram(s) IV Push once  glucagon  Injectable 1 milliGRAM(s) IntraMuscular once PRN  insulin lispro (HumaLOG) corrective regimen sliding scale   SubCutaneous Before meals and at bedtime  dextrose 5%. 1000 milliLiter(s) IV Continuous <Continuous>  heparin  Infusion.  Unit(s)/Hr IV Continuous <Continuous>  heparin  Injectable 5600 Unit(s) IV Push every 6 hours PRN  magnesium sulfate  IVPB 2 Gram(s) IV Intermittent every 4 hours    [PHYSICAL EXAM:  TELEMETRY:  T(C): 36.3 (05-06-19 @ 10:12), Max: 36.7 (05-06-19 @ 01:38)  HR: 81 (05-06-19 @ 09:15) (71 - 83)  BP: 183/96 (05-06-19 @ 09:15) (183/96 - 219/108)  RR: 18 (05-06-19 @ 09:15) (18 - 18)  SpO2: 96% (05-06-19 @ 09:15) (95% - 98%)  Wt(kg): --  I&O's Summary    05 May 2019 07:01  -  06 May 2019 07:00  --------------------------------------------------------  IN: 120 mL / OUT: 200 mL / NET: -80 mL    06 May 2019 07:01  -  06 May 2019 10:56  --------------------------------------------------------  IN: 120 mL / OUT: 0 mL / NET: 120 mL      Height (cm): 175.26 (05-06 @ 05:50)  Weight (kg): 93 (05-06 @ 05:50)  BMI (kg/m2): 30.3 (05-06 @ 05:50)  BSA (m2): 2.09 (05-06 @ 05:50)    Tay: No  Central/PICC/Mid Line:   No                                      Appearance: Normal	  HEENT:   Normal oral mucosa, PERRL, EOMI	  Neck: Supple. No JVD.   Cardiovascular: +S1 S2. RRR. No murmurs, rubs or gallops.   Respiratory: CTA Bilaterally. No rhonchi, wheezes, rales.   Gastrointestinal:  BS x 4. Soft NT/ND  Skin: No rashes, No ecchymoses, No cyanosis  Extremities: Normal range of motion, No clubbing, cyanosis or edema BLE.  Vascular: Peripheral pulses palpable 2+ bilaterally  Neurologic: Non-focal  Psychiatry: A & O x 3, Mood & affect appropriate      	    ECG:  	  RADIOLOGY:   DIAGNOSTIC TESTING:  [ ] Echocardiogram:  [ ]  Catheterization:  [ ] Stress Test:    [ ] RD  OTHER: 	    LABS:	 	  CARDIAC MARKERS:                  12.3   8.90  )-----------( 215      ( 06 May 2019 07:01 )             35.3     05-06    145  |  104  |  26<H>  ----------------------------<  144<H>  3.6   |  29  |  2.18<H>    Ca    9.1      06 May 2019 07:01  Phos  3.4     05-06  Mg     1.5     05-06    TPro  6.6  /  Alb  3.6  /  TBili  0.4  /  DBili  <0.2  /  AST  14  /  ALT  12  /  AlkPhos  91  05-06    proBNP: Serum Pro-Brain Natriuretic Peptide: 1651 pg/mL (05-06 @ 02:39)    Lipid Profile:   HgA1c: Hemoglobin A1C, Whole Blood: 7.2 % (05-06 @ 07:01)    TSH:   PT/INR - ( 06 May 2019 07:01 )   PT: 11.7 sec;   INR: 1.03          PTT - ( 06 May 2019 09:34 )  PTT:58.8 sec    ASSESSMENT/PLAN: 	        DVT ppx:  Dispo:

## 2019-05-06 NOTE — PROGRESS NOTE ADULT - PROBLEM SELECTOR PLAN 4
Hemoglobin A1C 7.2%.  Pt. takes Januumet 50-1000mg PO daily hold for now.    Insulin Lispro Med dose sliding scale.

## 2019-05-06 NOTE — H&P ADULT - PROBLEM SELECTOR PLAN 1
Monitor  BP on admission 212/108 with Troponin 0.06 and Creatinine 2.21.  Pt. takes Bystolic 20mg PO daily; Triamterene HCTZ  37.5/25mg daily   Holding.  Started Metoprolol 25mg PO bid  holding Triamterene HCTZ in setting creatinine 2.21

## 2019-05-07 LAB
ANION GAP SERPL CALC-SCNC: 12 MMOL/L — SIGNIFICANT CHANGE UP (ref 5–17)
APTT BLD: 51.3 SEC — HIGH (ref 27.5–36.3)
APTT BLD: 51.9 SEC — HIGH (ref 27.5–36.3)
APTT BLD: 56.6 SEC — HIGH (ref 27.5–36.3)
BASOPHILS # BLD AUTO: 0.07 K/UL — SIGNIFICANT CHANGE UP (ref 0–0.2)
BASOPHILS NFR BLD AUTO: 0.7 % — SIGNIFICANT CHANGE UP (ref 0–2)
BUN SERPL-MCNC: 26 MG/DL — HIGH (ref 7–23)
CALCIUM SERPL-MCNC: 9.2 MG/DL — SIGNIFICANT CHANGE UP (ref 8.4–10.5)
CHLORIDE SERPL-SCNC: 105 MMOL/L — SIGNIFICANT CHANGE UP (ref 96–108)
CO2 SERPL-SCNC: 26 MMOL/L — SIGNIFICANT CHANGE UP (ref 22–31)
CREAT SERPL-MCNC: 2.22 MG/DL — HIGH (ref 0.5–1.3)
EOSINOPHIL # BLD AUTO: 0.24 K/UL — SIGNIFICANT CHANGE UP (ref 0–0.5)
EOSINOPHIL NFR BLD AUTO: 2.5 % — SIGNIFICANT CHANGE UP (ref 0–6)
GLUCOSE BLDC GLUCOMTR-MCNC: 128 MG/DL — HIGH (ref 70–99)
GLUCOSE BLDC GLUCOMTR-MCNC: 142 MG/DL — HIGH (ref 70–99)
GLUCOSE BLDC GLUCOMTR-MCNC: 152 MG/DL — HIGH (ref 70–99)
GLUCOSE BLDC GLUCOMTR-MCNC: 174 MG/DL — HIGH (ref 70–99)
GLUCOSE SERPL-MCNC: 153 MG/DL — HIGH (ref 70–99)
HCT VFR BLD CALC: 34.5 % — LOW (ref 39–50)
HGB BLD-MCNC: 12 G/DL — LOW (ref 13–17)
IMM GRANULOCYTES NFR BLD AUTO: 0.3 % — SIGNIFICANT CHANGE UP (ref 0–1.5)
LYMPHOCYTES # BLD AUTO: 2.34 K/UL — SIGNIFICANT CHANGE UP (ref 1–3.3)
LYMPHOCYTES # BLD AUTO: 24.7 % — SIGNIFICANT CHANGE UP (ref 13–44)
MAGNESIUM SERPL-MCNC: 1.7 MG/DL — SIGNIFICANT CHANGE UP (ref 1.6–2.6)
MCHC RBC-ENTMCNC: 31.1 PG — SIGNIFICANT CHANGE UP (ref 27–34)
MCHC RBC-ENTMCNC: 34.8 GM/DL — SIGNIFICANT CHANGE UP (ref 32–36)
MCV RBC AUTO: 89.4 FL — SIGNIFICANT CHANGE UP (ref 80–100)
MONOCYTES # BLD AUTO: 0.85 K/UL — SIGNIFICANT CHANGE UP (ref 0–0.9)
MONOCYTES NFR BLD AUTO: 9 % — SIGNIFICANT CHANGE UP (ref 2–14)
NEUTROPHILS # BLD AUTO: 5.93 K/UL — SIGNIFICANT CHANGE UP (ref 1.8–7.4)
NEUTROPHILS NFR BLD AUTO: 62.8 % — SIGNIFICANT CHANGE UP (ref 43–77)
NRBC # BLD: 0 /100 WBCS — SIGNIFICANT CHANGE UP (ref 0–0)
PHOSPHATE SERPL-MCNC: 3.8 MG/DL — SIGNIFICANT CHANGE UP (ref 2.5–4.5)
PLATELET # BLD AUTO: 212 K/UL — SIGNIFICANT CHANGE UP (ref 150–400)
POTASSIUM SERPL-MCNC: 3.5 MMOL/L — SIGNIFICANT CHANGE UP (ref 3.5–5.3)
POTASSIUM SERPL-SCNC: 3.5 MMOL/L — SIGNIFICANT CHANGE UP (ref 3.5–5.3)
RBC # BLD: 3.86 M/UL — LOW (ref 4.2–5.8)
RBC # FLD: 12.6 % — SIGNIFICANT CHANGE UP (ref 10.3–14.5)
SODIUM SERPL-SCNC: 143 MMOL/L — SIGNIFICANT CHANGE UP (ref 135–145)
WBC # BLD: 9.46 K/UL — SIGNIFICANT CHANGE UP (ref 3.8–10.5)
WBC # FLD AUTO: 9.46 K/UL — SIGNIFICANT CHANGE UP (ref 3.8–10.5)

## 2019-05-07 PROCEDURE — 93306 TTE W/DOPPLER COMPLETE: CPT | Mod: 26

## 2019-05-07 PROCEDURE — 99232 SBSQ HOSP IP/OBS MODERATE 35: CPT

## 2019-05-07 RX ORDER — POTASSIUM CHLORIDE 20 MEQ
40 PACKET (EA) ORAL ONCE
Qty: 0 | Refills: 0 | Status: COMPLETED | OUTPATIENT
Start: 2019-05-07 | End: 2019-05-07

## 2019-05-07 RX ORDER — ISOSORBIDE MONONITRATE 60 MG/1
30 TABLET, EXTENDED RELEASE ORAL ONCE
Qty: 0 | Refills: 0 | Status: COMPLETED | OUTPATIENT
Start: 2019-05-07 | End: 2019-05-07

## 2019-05-07 RX ORDER — BACITRACIN ZINC 500 UNIT/G
1 OINTMENT IN PACKET (EA) TOPICAL
Qty: 0 | Refills: 0 | Status: DISCONTINUED | OUTPATIENT
Start: 2019-05-07 | End: 2019-05-09

## 2019-05-07 RX ORDER — ISOSORBIDE MONONITRATE 60 MG/1
90 TABLET, EXTENDED RELEASE ORAL DAILY
Qty: 0 | Refills: 0 | Status: DISCONTINUED | OUTPATIENT
Start: 2019-05-08 | End: 2019-05-09

## 2019-05-07 RX ORDER — HEPARIN SODIUM 5000 [USP'U]/ML
INJECTION INTRAVENOUS; SUBCUTANEOUS
Qty: 25000 | Refills: 0 | Status: DISCONTINUED | OUTPATIENT
Start: 2019-05-07 | End: 2019-05-09

## 2019-05-07 RX ORDER — MAGNESIUM SULFATE 500 MG/ML
2 VIAL (ML) INJECTION ONCE
Qty: 0 | Refills: 0 | Status: COMPLETED | OUTPATIENT
Start: 2019-05-07 | End: 2019-05-07

## 2019-05-07 RX ADMIN — ISOSORBIDE MONONITRATE 60 MILLIGRAM(S): 60 TABLET, EXTENDED RELEASE ORAL at 11:06

## 2019-05-07 RX ADMIN — HEPARIN SODIUM 1200 UNIT(S)/HR: 5000 INJECTION INTRAVENOUS; SUBCUTANEOUS at 15:02

## 2019-05-07 RX ADMIN — ATORVASTATIN CALCIUM 80 MILLIGRAM(S): 80 TABLET, FILM COATED ORAL at 21:30

## 2019-05-07 RX ADMIN — Medication 1 APPLICATION(S): at 06:43

## 2019-05-07 RX ADMIN — Medication 25 MILLIGRAM(S): at 16:59

## 2019-05-07 RX ADMIN — Medication 1 APPLICATION(S): at 16:59

## 2019-05-07 RX ADMIN — Medication 40 MILLIEQUIVALENT(S): at 16:59

## 2019-05-07 RX ADMIN — Medication 40 MILLIEQUIVALENT(S): at 07:55

## 2019-05-07 RX ADMIN — Medication 50 GRAM(S): at 07:55

## 2019-05-07 RX ADMIN — Medication 81 MILLIGRAM(S): at 11:06

## 2019-05-07 RX ADMIN — Medication 25 MILLIGRAM(S): at 06:37

## 2019-05-07 RX ADMIN — Medication 2: at 06:37

## 2019-05-07 RX ADMIN — HEPARIN SODIUM 1000 UNIT(S)/HR: 5000 INJECTION INTRAVENOUS; SUBCUTANEOUS at 07:53

## 2019-05-07 RX ADMIN — HEPARIN SODIUM 1200 UNIT(S)/HR: 5000 INJECTION INTRAVENOUS; SUBCUTANEOUS at 22:31

## 2019-05-07 RX ADMIN — ISOSORBIDE MONONITRATE 30 MILLIGRAM(S): 60 TABLET, EXTENDED RELEASE ORAL at 19:04

## 2019-05-07 RX ADMIN — AMLODIPINE BESYLATE 10 MILLIGRAM(S): 2.5 TABLET ORAL at 06:37

## 2019-05-07 RX ADMIN — Medication 2: at 21:32

## 2019-05-07 NOTE — PROGRESS NOTE ADULT - SUBJECTIVE AND OBJECTIVE BOX
CC: Hypertensive Emergency, NSTEMI    Subjective Assessment: no complaints    ROS Otherwise negative unless otherwise stated except per Subjective    	  MEDICATIONS:  amLODIPine   Tablet 10 milliGRAM(s) Oral daily  isosorbide   mononitrate ER Tablet (IMDUR) 60 milliGRAM(s) Oral daily  metoprolol tartrate 25 milliGRAM(s) Oral two times a day  atorvastatin 80 milliGRAM(s) Oral at bedtime  dextrose 40% Gel 15 Gram(s) Oral once PRN  dextrose 50% Injectable 12.5 Gram(s) IV Push once  dextrose 50% Injectable 25 Gram(s) IV Push once  dextrose 50% Injectable 25 Gram(s) IV Push once  glucagon  Injectable 1 milliGRAM(s) IntraMuscular once PRN  insulin lispro (HumaLOG) corrective regimen sliding scale   SubCutaneous Before meals and at bedtime    aspirin enteric coated 81 milliGRAM(s) Oral daily  BACItracin   Ointment 1 Application(s) Topical two times a day  dextrose 5%. 1000 milliLiter(s) IV Continuous <Continuous>  heparin  Infusion.  Unit(s)/Hr IV Continuous <Continuous>  heparin  Injectable 5600 Unit(s) IV Push every 6 hours PRN      	    [PHYSICAL EXAM:  TELEMETRY:  T(C): 37.1 (05-07-19 @ 14:56), Max: 37.1 (05-07-19 @ 14:56)  HR: 79 (05-07-19 @ 11:08) (74 - 84)  BP: 170/89 (05-07-19 @ 11:08) (119/67 - 178/88)  RR: 18 (05-07-19 @ 11:08) (18 - 18)  SpO2: 95% (05-07-19 @ 11:08) (94% - 97%)  Wt(kg): --  I&O's Summary    06 May 2019 07:01  -  07 May 2019 07:00  --------------------------------------------------------  IN: 267 mL / OUT: 860 mL / NET: -593 mL    07 May 2019 07:01  -  07 May 2019 15:39  --------------------------------------------------------  IN: 410 mL / OUT: 140 mL / NET: 270 mL    Tay: No  Central/PICC/Mid Line:  No                                       Appearance: Normal	  HEENT:   Normal oral mucosa, PERRL, EOMI	  Neck: Supple. No JVD.   Cardiovascular: +S1 S2. RRR. No murmurs, rubs or gallops.   Respiratory: CTA Bilaterally. No rhonchi, wheezes, rales.   Gastrointestinal:  BS x 4. Soft NT/ND  Skin: No rashes, No ecchymoses, No cyanosis  Extremities: Normal range of motion, No clubbing, cyanosis or edema BLE.  Vascular: Peripheral pulses palpable 2+ bilaterally  Neurologic: Non-focal  Psychiatry: A & O x 3, Mood & affect appropriate      	    ECG:  	  RADIOLOGY:   DIAGNOSTIC TESTING:  [ ] Echocardiogram:  [ ]  Catheterization:  [ ] Stress Test:    [ ] RD  OTHER: 	    LABS:	 	  CARDIAC MARKERS:                        12.0   9.46  )-----------( 212      ( 07 May 2019 06:10 )             34.5     05-07    143  |  105  |  26<H>  ----------------------------<  153<H>  3.5   |  26  |  2.22<H>    Ca    9.2      07 May 2019 06:10  Phos  3.8     05-07  Mg     1.7     05-07    TPro  6.6  /  Alb  3.6  /  TBili  0.4  /  DBili  <0.2  /  AST  14  /  ALT  12  /  AlkPhos  91  05-06    proBNP:   Lipid Profile:   HgA1c:   TSH:   PT/INR - ( 06 May 2019 07:01 )   PT: 11.7 sec;   INR: 1.03          PTT - ( 07 May 2019 13:53 )  PTT:51.3 sec    ASSESSMENT/PLAN: 	        DVT ppx:  Dispo:

## 2019-05-07 NOTE — PROGRESS NOTE ADULT - PROBLEM SELECTOR PLAN 3
Outpatient labs obtained Cr 1.79 in 5/2018 Cr 2.42 in 1/2019.   Cr 2.22  -Avoid Nephrotoxic Agents   -U/A negative for UTI. U/A+ Blood and Protein  -Monitor BP, urine output, electrolytes and volume status.

## 2019-05-07 NOTE — PROGRESS NOTE ADULT - PROBLEM SELECTOR PLAN 2
Peak Troponin 0.06 trending down.   Patient currently C/P free and HD stable.  -Continue Heparin drip.   -Continue Metoprolol.   -Lipitor 80 mg daily.  -Echocardiogram ordered. Follow-up results. Possible ischemic work-up inpatient stress vs. cath depending on echo results.

## 2019-05-07 NOTE — PROGRESS NOTE ADULT - ASSESSMENT
47 y/o male with PMHx of HTN, HPL, CKD Stage III-baseline Cr 1.79-2.42 and DM-2 who presented to [facility] ED 5/6/19 c/o C/P and R/I NSTEMI in the setting of Hypertensive Emergency.

## 2019-05-07 NOTE — PROGRESS NOTE ADULT - SUBJECTIVE AND OBJECTIVE BOX
Interventional Cardiology PA Adult Progress Note    CC: Hypertensive Emergency, NSTEMI    Subjective Assessment: Patient seen and examined at bedside. Patient denies C/P, SOB, palpitations, dizziness, diaphoresis, N/V, paresthesias of arms or legs, facial droop or facial numbness, changes in speech, H/A, visual changes.    ROS Otherwise negative unless otherwise stated except per Subjective    	  MEDICATIONS:  amLODIPine   Tablet 10 milliGRAM(s) Oral daily  isosorbide   mononitrate ER Tablet (IMDUR) 60 milliGRAM(s) Oral daily  metoprolol tartrate 25 milliGRAM(s) Oral two times a day  atorvastatin 80 milliGRAM(s) Oral at bedtime  dextrose 40% Gel 15 Gram(s) Oral once PRN  dextrose 50% Injectable 12.5 Gram(s) IV Push once  dextrose 50% Injectable 25 Gram(s) IV Push once  dextrose 50% Injectable 25 Gram(s) IV Push once  glucagon  Injectable 1 milliGRAM(s) IntraMuscular once PRN  insulin lispro (HumaLOG) corrective regimen sliding scale   SubCutaneous Before meals and at bedtime    aspirin enteric coated 81 milliGRAM(s) Oral daily  BACItracin   Ointment 1 Application(s) Topical two times a day  dextrose 5%. 1000 milliLiter(s) IV Continuous <Continuous>  heparin  Infusion.  Unit(s)/Hr IV Continuous <Continuous>  heparin  Injectable 5600 Unit(s) IV Push every 6 hours PRN      	    [PHYSICAL EXAM:  TELEMETRY:  T(C): 37.1 (05-07-19 @ 14:56), Max: 37.1 (05-07-19 @ 14:56)  HR: 79 (05-07-19 @ 11:08) (74 - 84)  BP: 170/89 (05-07-19 @ 11:08) (119/67 - 178/88)  RR: 18 (05-07-19 @ 11:08) (18 - 18)  SpO2: 95% (05-07-19 @ 11:08) (94% - 97%)  Wt(kg): --  I&O's Summary    06 May 2019 07:01  -  07 May 2019 07:00  --------------------------------------------------------  IN: 267 mL / OUT: 860 mL / NET: -593 mL    07 May 2019 07:01  -  07 May 2019 15:39  --------------------------------------------------------  IN: 410 mL / OUT: 140 mL / NET: 270 mL    Tay: No  Central/PICC/Mid Line:  No                                       Appearance: Normal	  HEENT:   Normal oral mucosa, PERRL, EOMI	  Neck: Supple. No JVD.   Cardiovascular: +S1 S2. RRR. No murmurs, rubs or gallops.   Respiratory: CTA Bilaterally. No rhonchi, wheezes, rales.   Gastrointestinal:  BS x 4. Soft NT/ND  Skin: No rashes, No ecchymoses, No cyanosis  Extremities: Normal range of motion, No clubbing, cyanosis or edema BLE.  Vascular: Peripheral pulses palpable 2+ bilaterally  Neurologic: Non-focal  Psychiatry: A & O x 3, Mood & affect appropriate      	    ECG:  	  RADIOLOGY:   DIAGNOSTIC TESTING:  [ ] Echocardiogram:  [ ]  Catheterization:  [ ] Stress Test:    [ ] RD  OTHER: 	    LABS:	 	  CARDIAC MARKERS:                        12.0   9.46  )-----------( 212      ( 07 May 2019 06:10 )             34.5     05-07    143  |  105  |  26<H>  ----------------------------<  153<H>  3.5   |  26  |  2.22<H>    Ca    9.2      07 May 2019 06:10  Phos  3.8     05-07  Mg     1.7     05-07    TPro  6.6  /  Alb  3.6  /  TBili  0.4  /  DBili  <0.2  /  AST  14  /  ALT  12  /  AlkPhos  91  05-06    proBNP:   Lipid Profile:   HgA1c:   TSH:   PT/INR - ( 06 May 2019 07:01 )   PT: 11.7 sec;   INR: 1.03          PTT - ( 07 May 2019 13:53 )  PTT:51.3 sec    ASSESSMENT/PLAN: 	        DVT ppx:  Dispo:

## 2019-05-07 NOTE — PROGRESS NOTE ADULT - ASSESSMENT
49 y/o male with PMHx of HTN, HPL, CKD Stage III-baseline Cr 1.79-2.42 and DM-2 who presented to [facility] ED 5/6/19 c/o C/P and R/I NSTEMI in the setting of Hypertensive Emergency.

## 2019-05-07 NOTE — PROGRESS NOTE ADULT - PROBLEM SELECTOR PLAN 1
's/100s -Nitro drip initiated and now tapered off. BP now improved 160-170's/80s  -Continue Amlodipine, Metoprolol and Imdur.   -Holding Triamterene/HCTZ in the setting of elevated Cr and possible cath.

## 2019-05-08 LAB
ANION GAP SERPL CALC-SCNC: 13 MMOL/L — SIGNIFICANT CHANGE UP (ref 5–17)
APTT BLD: 59.8 SEC — HIGH (ref 27.5–36.3)
BUN SERPL-MCNC: 27 MG/DL — HIGH (ref 7–23)
CALCIUM SERPL-MCNC: 9.2 MG/DL — SIGNIFICANT CHANGE UP (ref 8.4–10.5)
CHLORIDE SERPL-SCNC: 104 MMOL/L — SIGNIFICANT CHANGE UP (ref 96–108)
CO2 SERPL-SCNC: 24 MMOL/L — SIGNIFICANT CHANGE UP (ref 22–31)
CREAT SERPL-MCNC: 2.24 MG/DL — HIGH (ref 0.5–1.3)
GLUCOSE BLDC GLUCOMTR-MCNC: 138 MG/DL — HIGH (ref 70–99)
GLUCOSE BLDC GLUCOMTR-MCNC: 155 MG/DL — HIGH (ref 70–99)
GLUCOSE BLDC GLUCOMTR-MCNC: 163 MG/DL — HIGH (ref 70–99)
GLUCOSE BLDC GLUCOMTR-MCNC: 190 MG/DL — HIGH (ref 70–99)
GLUCOSE SERPL-MCNC: 157 MG/DL — HIGH (ref 70–99)
HCT VFR BLD CALC: 33.1 % — LOW (ref 39–50)
HGB BLD-MCNC: 11.7 G/DL — LOW (ref 13–17)
MAGNESIUM SERPL-MCNC: 1.7 MG/DL — SIGNIFICANT CHANGE UP (ref 1.6–2.6)
MCHC RBC-ENTMCNC: 31.2 PG — SIGNIFICANT CHANGE UP (ref 27–34)
MCHC RBC-ENTMCNC: 35.3 GM/DL — SIGNIFICANT CHANGE UP (ref 32–36)
MCV RBC AUTO: 88.3 FL — SIGNIFICANT CHANGE UP (ref 80–100)
NRBC # BLD: 0 /100 WBCS — SIGNIFICANT CHANGE UP (ref 0–0)
PHOSPHATE SERPL-MCNC: 3.7 MG/DL — SIGNIFICANT CHANGE UP (ref 2.5–4.5)
PLATELET # BLD AUTO: 214 K/UL — SIGNIFICANT CHANGE UP (ref 150–400)
POTASSIUM SERPL-MCNC: 4 MMOL/L — SIGNIFICANT CHANGE UP (ref 3.5–5.3)
POTASSIUM SERPL-SCNC: 4 MMOL/L — SIGNIFICANT CHANGE UP (ref 3.5–5.3)
RBC # BLD: 3.75 M/UL — LOW (ref 4.2–5.8)
RBC # FLD: 12.5 % — SIGNIFICANT CHANGE UP (ref 10.3–14.5)
SODIUM SERPL-SCNC: 141 MMOL/L — SIGNIFICANT CHANGE UP (ref 135–145)
WBC # BLD: 9.33 K/UL — SIGNIFICANT CHANGE UP (ref 3.8–10.5)
WBC # FLD AUTO: 9.33 K/UL — SIGNIFICANT CHANGE UP (ref 3.8–10.5)

## 2019-05-08 PROCEDURE — 99232 SBSQ HOSP IP/OBS MODERATE 35: CPT

## 2019-05-08 PROCEDURE — 93010 ELECTROCARDIOGRAM REPORT: CPT

## 2019-05-08 RX ORDER — HYDRALAZINE HCL 50 MG
25 TABLET ORAL THREE TIMES A DAY
Qty: 0 | Refills: 0 | Status: DISCONTINUED | OUTPATIENT
Start: 2019-05-08 | End: 2019-05-09

## 2019-05-08 RX ORDER — MAGNESIUM OXIDE 400 MG ORAL TABLET 241.3 MG
400 TABLET ORAL DAILY
Qty: 0 | Refills: 0 | Status: DISCONTINUED | OUTPATIENT
Start: 2019-05-08 | End: 2019-05-09

## 2019-05-08 RX ORDER — HYDRALAZINE HCL 50 MG
25 TABLET ORAL THREE TIMES A DAY
Qty: 0 | Refills: 0 | Status: DISCONTINUED | OUTPATIENT
Start: 2019-05-08 | End: 2019-05-08

## 2019-05-08 RX ORDER — CARVEDILOL PHOSPHATE 80 MG/1
6.25 CAPSULE, EXTENDED RELEASE ORAL EVERY 12 HOURS
Qty: 0 | Refills: 0 | Status: DISCONTINUED | OUTPATIENT
Start: 2019-05-08 | End: 2019-05-09

## 2019-05-08 RX ORDER — MAGNESIUM OXIDE 400 MG ORAL TABLET 241.3 MG
400 TABLET ORAL ONCE
Qty: 0 | Refills: 0 | Status: COMPLETED | OUTPATIENT
Start: 2019-05-08 | End: 2019-05-08

## 2019-05-08 RX ORDER — METOPROLOL TARTRATE 50 MG
25 TABLET ORAL ONCE
Qty: 0 | Refills: 0 | Status: COMPLETED | OUTPATIENT
Start: 2019-05-08 | End: 2019-05-08

## 2019-05-08 RX ORDER — HYDRALAZINE HCL 50 MG
25 TABLET ORAL ONCE
Qty: 0 | Refills: 0 | Status: COMPLETED | OUTPATIENT
Start: 2019-05-08 | End: 2019-05-08

## 2019-05-08 RX ADMIN — Medication 25 MILLIGRAM(S): at 05:11

## 2019-05-08 RX ADMIN — Medication 2: at 07:19

## 2019-05-08 RX ADMIN — ISOSORBIDE MONONITRATE 90 MILLIGRAM(S): 60 TABLET, EXTENDED RELEASE ORAL at 11:19

## 2019-05-08 RX ADMIN — Medication 2: at 22:14

## 2019-05-08 RX ADMIN — HEPARIN SODIUM 1200 UNIT(S)/HR: 5000 INJECTION INTRAVENOUS; SUBCUTANEOUS at 06:07

## 2019-05-08 RX ADMIN — Medication 25 MILLIGRAM(S): at 10:07

## 2019-05-08 RX ADMIN — Medication 1 APPLICATION(S): at 05:12

## 2019-05-08 RX ADMIN — ATORVASTATIN CALCIUM 80 MILLIGRAM(S): 80 TABLET, FILM COATED ORAL at 22:16

## 2019-05-08 RX ADMIN — MAGNESIUM OXIDE 400 MG ORAL TABLET 400 MILLIGRAM(S): 241.3 TABLET ORAL at 08:30

## 2019-05-08 RX ADMIN — Medication 81 MILLIGRAM(S): at 11:18

## 2019-05-08 RX ADMIN — Medication 25 MILLIGRAM(S): at 22:17

## 2019-05-08 RX ADMIN — Medication 25 MILLIGRAM(S): at 13:13

## 2019-05-08 RX ADMIN — Medication 25 MILLIGRAM(S): at 07:19

## 2019-05-08 RX ADMIN — Medication 2: at 16:46

## 2019-05-08 RX ADMIN — CARVEDILOL PHOSPHATE 6.25 MILLIGRAM(S): 80 CAPSULE, EXTENDED RELEASE ORAL at 17:36

## 2019-05-08 RX ADMIN — AMLODIPINE BESYLATE 10 MILLIGRAM(S): 2.5 TABLET ORAL at 05:11

## 2019-05-08 RX ADMIN — Medication 1 APPLICATION(S): at 17:36

## 2019-05-08 NOTE — CONSULT NOTE ADULT - REASON FOR ADMISSION
Intermittent, non exertional, mild chest pressure with no associated symptoms this evening.  In ER pt. noted to be HTN Emergency 212/108 with Troponin 0.06.
Intermittent, non exertional, mild chest pressure with no associated symptoms this evening.  In ER pt. noted to be HTN Emergency 212/108 with Troponin 0.06.

## 2019-05-08 NOTE — PROGRESS NOTE ADULT - SUBJECTIVE AND OBJECTIVE BOX
Interventional Cardiology PA Adult Progress Note    Subjective: Pt seen at bedside, sitting up comfortably in bed. Denies CP, SOB, Palpitations, diaphoresis, Syncope, N/V or bleeding issues at this time.  	  MEDICATIONS:  amLODIPine   Tablet 10 milliGRAM(s) Oral daily  hydrALAZINE 25 milliGRAM(s) Oral three times a day  isosorbide   mononitrate ER Tablet (IMDUR) 90 milliGRAM(s) Oral daily  metoprolol tartrate 25 milliGRAM(s) Oral two times a day  atorvastatin 80 milliGRAM(s) Oral at bedtime  dextrose 40% Gel 15 Gram(s) Oral once PRN  dextrose 50% Injectable 12.5 Gram(s) IV Push once  dextrose 50% Injectable 25 Gram(s) IV Push once  dextrose 50% Injectable 25 Gram(s) IV Push once  glucagon  Injectable 1 milliGRAM(s) IntraMuscular once PRN  insulin lispro (HumaLOG) corrective regimen sliding scale   SubCutaneous Before meals and at bedtime  aspirin enteric coated 81 milliGRAM(s) Oral daily  BACItracin   Ointment 1 Application(s) Topical two times a day  dextrose 5%. 1000 milliLiter(s) IV Continuous <Continuous>  heparin  Infusion.  Unit(s)/Hr IV Continuous <Continuous>  heparin  Injectable 5600 Unit(s) IV Push every 6 hours PRN	    [PHYSICAL EXAM:  TELEMETRY:  T(C): 36.7 (19 @ 14:28), Max: 36.9 (19 @ 07:06)  HR: 84 (19 @ 13:12) (75 - 84)  BP: 166/90 (19 @ 13:12) (165/85 - 189/96)  RR: 16 (19 @ 13:12) (16 - 18)  SpO2: 96% (19 @ 13:12) (95% - 97%)  Wt(kg): --  I&O's Summary    07 May 2019 07:01  -  08 May 2019 07:00  --------------------------------------------------------  IN: 994 mL / OUT: 1780 mL / NET: -786 mL    08 May 2019 07:01  -  08 May 2019 15:52  --------------------------------------------------------  IN: 120 mL / OUT: 400 mL / NET: -280 mL                                      Appearance: Normal, NAD	  Neck: Supple, - JVD; -Carotid Bruit   Cardiovascular: Normal S1 S2, No JVD, No murmurs,   Respiratory: Lungs clear to auscultation, no Rales/Rhonchi/Wheezing	  Gastrointestinal:  Soft, Non-tender, + BS	x4  Skin: No rashes, No ecchymoses, No cyanosis  Extremities: Normal range of motion, No clubbing, cyanosis or edema  Vascular: Peripheral pulses palpable 2+ bilaterally  Neurologic: Non-focal  Psychiatry: A & O x 3, Mood & affect appropriate  	    EC.7   9.33  )-----------( 214      ( 08 May 2019 04:54 )             33.1     05-08    141  |  104  |  27<H>  ----------------------------<  157<H>  4.0   |  24  |  2.24<H>    Ca    9.2      08 May 2019 04:54  Phos  3.7     05-08  Mg     1.7     05-08      PTT - ( 08 May 2019 04:54 )  PTT:59.8 sec      DVT ppx:  Heparin gtt    Dispo: Continue medical management of NSTEMI and Hypertensive Emergency Interventional Cardiology PA Adult Progress Note    Subjective: Pt seen at bedside, sitting up comfortably in bed. Denies CP, SOB, Palpitations, diaphoresis, Syncope, N/V or bleeding issues at this time.    12 point ROS negative.  	  MEDICATIONS:  amLODIPine   Tablet 10 milliGRAM(s) Oral daily  hydrALAZINE 25 milliGRAM(s) Oral three times a day  isosorbide   mononitrate ER Tablet (IMDUR) 90 milliGRAM(s) Oral daily  metoprolol tartrate 25 milliGRAM(s) Oral two times a day  atorvastatin 80 milliGRAM(s) Oral at bedtime  dextrose 40% Gel 15 Gram(s) Oral once PRN  dextrose 50% Injectable 12.5 Gram(s) IV Push once  dextrose 50% Injectable 25 Gram(s) IV Push once  dextrose 50% Injectable 25 Gram(s) IV Push once  glucagon  Injectable 1 milliGRAM(s) IntraMuscular once PRN  insulin lispro (HumaLOG) corrective regimen sliding scale   SubCutaneous Before meals and at bedtime  aspirin enteric coated 81 milliGRAM(s) Oral daily  BACItracin   Ointment 1 Application(s) Topical two times a day  dextrose 5%. 1000 milliLiter(s) IV Continuous <Continuous>  heparin  Infusion.  Unit(s)/Hr IV Continuous <Continuous>  heparin  Injectable 5600 Unit(s) IV Push every 6 hours PRN	    [PHYSICAL EXAM:  TELEMETRY:  T(C): 36.7 (19 @ 14:28), Max: 36.9 (19 @ 07:06)  HR: 84 (19 @ 13:12) (75 - 84)  BP: 166/90 (19 @ 13:12) (165/85 - 189/96)  RR: 16 (19 @ 13:12) (16 - 18)  SpO2: 96% (19 @ 13:12) (95% - 97%)  Wt(kg): --  I&O's Summary    07 May 2019 07:01  -  08 May 2019 07:00  --------------------------------------------------------  IN: 994 mL / OUT: 1780 mL / NET: -786 mL    08 May 2019 07:01  -  08 May 2019 15:52  --------------------------------------------------------  IN: 120 mL / OUT: 400 mL / NET: -280 mL                                      Appearance: Normal, NAD	  Neck: Supple, - JVD; -Carotid Bruit   Cardiovascular: Normal S1 S2, No JVD, No murmurs,   Respiratory: Lungs clear to auscultation, no Rales/Rhonchi/Wheezing	  Gastrointestinal:  Soft, Non-tender, + BS	x4  Skin: No rashes, No ecchymoses, No cyanosis  Extremities: Normal range of motion, No clubbing, cyanosis or edema  Vascular: Peripheral pulses palpable 2+ bilaterally  Neurologic: Non-focal  Psychiatry: A & O x 3, Mood & affect appropriate  	    EC.7   9.33  )-----------( 214      ( 08 May 2019 04:54 )             33.1     05-08    141  |  104  |  27<H>  ----------------------------<  157<H>  4.0   |  24  |  2.24<H>    Ca    9.2      08 May 2019 04:54  Phos  3.7     05-08  Mg     1.7     05-08      PTT - ( 08 May 2019 04:54 )  PTT:59.8 sec      DVT ppx:  Heparin gtt    Dispo: Continue medical management of NSTEMI and Hypertensive Emergency

## 2019-05-08 NOTE — PROGRESS NOTE ADULT - SUBJECTIVE AND OBJECTIVE BOX
Subjective: Pt seen at bedside, sitting up comfortably in bed. Denies CP, SOB, Palpitations, diaphoresis, Syncope, N/V or bleeding issues at this time.    12 point ROS negative.  	  MEDICATIONS:  amLODIPine   Tablet 10 milliGRAM(s) Oral daily  hydrALAZINE 25 milliGRAM(s) Oral three times a day  isosorbide   mononitrate ER Tablet (IMDUR) 90 milliGRAM(s) Oral daily  metoprolol tartrate 25 milliGRAM(s) Oral two times a day  atorvastatin 80 milliGRAM(s) Oral at bedtime  dextrose 40% Gel 15 Gram(s) Oral once PRN  dextrose 50% Injectable 12.5 Gram(s) IV Push once  dextrose 50% Injectable 25 Gram(s) IV Push once  dextrose 50% Injectable 25 Gram(s) IV Push once  glucagon  Injectable 1 milliGRAM(s) IntraMuscular once PRN  insulin lispro (HumaLOG) corrective regimen sliding scale   SubCutaneous Before meals and at bedtime  aspirin enteric coated 81 milliGRAM(s) Oral daily  BACItracin   Ointment 1 Application(s) Topical two times a day  dextrose 5%. 1000 milliLiter(s) IV Continuous <Continuous>  heparin  Infusion.  Unit(s)/Hr IV Continuous <Continuous>  heparin  Injectable 5600 Unit(s) IV Push every 6 hours PRN	    [PHYSICAL EXAM:  TELEMETRY:  T(C): 36.7 (19 @ 14:28), Max: 36.9 (19 @ 07:06)  HR: 84 (19 @ 13:12) (75 - 84)  BP: 166/90 (19 @ 13:12) (165/85 - 189/96)  RR: 16 (19 @ 13:12) (16 - 18)  SpO2: 96% (19 @ 13:12) (95% - 97%)  Wt(kg): --  I&O's Summary    07 May 2019 07:01  -  08 May 2019 07:00  --------------------------------------------------------  IN: 994 mL / OUT: 1780 mL / NET: -786 mL    08 May 2019 07:01  -  08 May 2019 15:52  --------------------------------------------------------  IN: 120 mL / OUT: 400 mL / NET: -280 mL                                      Appearance: Normal, NAD	  Neck: Supple, - JVD; -Carotid Bruit   Cardiovascular: Normal S1 S2, No JVD, No murmurs,   Respiratory: Lungs clear to auscultation, no Rales/Rhonchi/Wheezing	  Gastrointestinal:  Soft, Non-tender, + BS	x4  Skin: No rashes, No ecchymoses, No cyanosis  Extremities: Normal range of motion, No clubbing, cyanosis or edema  Vascular: Peripheral pulses palpable 2+ bilaterally  Neurologic: Non-focal  Psychiatry: A & O x 3, Mood & affect appropriate  	    EC.7   9.33  )-----------( 214      ( 08 May 2019 04:54 )             33.1     05-08    141  |  104  |  27<H>  ----------------------------<  157<H>  4.0   |  24  |  2.24<H>    Ca    9.2      08 May 2019 04:54  Phos  3.7     05-08  Mg     1.7     05-08      PTT - ( 08 May 2019 04:54 )  PTT:59.8 sec      DVT ppx:  Heparin gtt    Dispo: Continue medical management of NSTEMI and Hypertensive Emergency

## 2019-05-08 NOTE — PROGRESS NOTE ADULT - PROBLEM SELECTOR PLAN 2
Peak Troponin 0.06 trending down.   Patient currently C/P free and HD stable.  -Continue Heparin drip.   -NST in AM.  NPO @ MN  -Continue Metoprolol.   -Lipitor 80 mg daily.  -Echocardiogram ordered. Follow-up results. Possible ischemic work-up inpatient stress vs. cath depending on echo results.

## 2019-05-08 NOTE — PROGRESS NOTE ADULT - PROBLEM SELECTOR PLAN 1
's/100s -Nitro drip initiated and now tapered off. BP now improved 160-170's/80s  -Continue Amlodipine, and Imdur.   Lopressor D/C and Coreg 6.25mg BID.   -Given Hydralazine 25mg PO stat, with improvement in 's.    - Will continue Hydralazine 25mg TID  -Holding Triamterene/HCTZ in the setting of elevated Cr and possible cath.

## 2019-05-08 NOTE — CONSULT NOTE ADULT - ATTENDING COMMENTS
CKD with STAN in setting of chest pain - possible NSTEMI  rise in creatinine not unexpected in acute events  monitor trend in BUN/creat  maintain net neutral to mild negative balance
CAD  pos trop  DM  HTN--urgency  to cont present--  ?cath  DM a/o

## 2019-05-08 NOTE — PROGRESS NOTE ADULT - ASSESSMENT
49 y/o male with PMHx of HTN, HPL, CKD Stage III-baseline Cr 1.79-2.42 and DM-2 who presented to [facility] ED 5/6/19 c/o C/P and R/I NSTEMI in the setting of Hypertensive Emergency.

## 2019-05-08 NOTE — CONSULT NOTE ADULT - SUBJECTIVE AND OBJECTIVE BOX
Patient is a 48y old  Male who presents with a chief complaint of Intermittent, non exertional, mild chest pressure with no associated symptoms this evening.     HPI:  49 y/o male with PMHx of HTN, HPL, renal insufficiency (on admission Creatinine 2.21 no other labs to compare; pt. denies hx of CKD) and DM-2 present to [facility] ER this morning, 5/6/19, complaining of intermittent, non exertional, mild chest pressure associated with no symptoms last night 11PM.   In ER Pt.'s BP noted to be 212/108 with elevated Troponin 0.06; pt. reports  not taking his medication on Saturday; otherwise he is compliant with his BP medication.       According to patient, he was sitting in his chair watching television around 11PM when he suddenly felt mild chest pressure, 5/10 lasting 2-3 minutes with no associated symptoms.  He was concerned because he never experienced chest pressure before and the feeling took his breath away.    Pt. states he had stress test one year ago which was normal and denies any cardiac history including HF.  Pt. also denies family hx of cardiac disease.       In ER ECG reveals NSR@71bpm with non specific ST-T wave changes, Troponin 0.06, , BNP 1651, BUN/Creatinine 26/2.21, Blood Glucose 183 and H/H 12.0/34.8.  CXR reveals no acute pathology f/u official report.  BP noted to be 212/108; pt. was given Labetalol 10mg IV X1 reducing 's an additional Lopressor 5mg IV push given.  Pt. ruled in NSTEMI in setting HTN Emergency Heparin gtt started and given Aspirin Ec 325mg PO X1.    In light of patient ruling in NSTEMI in setting HTN Emergency, hx and symptoms pt. admitted to [facility] for telemetry, ECG, serial CE, Echocardiogram, NPO for further cardiac workup to rule out ACS and HTN management.  Holding pt.'s Triamterene/HCTz (37.5-25mg) in setting of elevated CK  2.21.  pt. denies hx of CKD.  Discuss further with Dr. Chicas (06 May 2019 05:31)      PAST MEDICAL & SURGICAL HISTORY:  Hyperlipidemia  Diabetes  Hypertension  History of cholecystectomy      Allergies    No Known Allergies    Intolerances        FAMILY HISTORY:  No pertinent family history in first degree relatives      SOCIAL HISTORY:    MEDICATIONS  (STANDING):  amLODIPine   Tablet 10 milliGRAM(s) Oral daily  aspirin enteric coated 81 milliGRAM(s) Oral daily  atorvastatin 80 milliGRAM(s) Oral at bedtime  BACItracin   Ointment 1 Application(s) Topical two times a day  carvedilol 6.25 milliGRAM(s) Oral every 12 hours  dextrose 5%. 1000 milliLiter(s) (50 mL/Hr) IV Continuous <Continuous>  dextrose 50% Injectable 12.5 Gram(s) IV Push once  dextrose 50% Injectable 25 Gram(s) IV Push once  dextrose 50% Injectable 25 Gram(s) IV Push once  heparin  Infusion.  Unit(s)/Hr (10 mL/Hr) IV Continuous <Continuous>  hydrALAZINE 25 milliGRAM(s) Oral three times a day  insulin lispro (HumaLOG) corrective regimen sliding scale   SubCutaneous Before meals and at bedtime  isosorbide   mononitrate ER Tablet (IMDUR) 90 milliGRAM(s) Oral daily  magnesium oxide 400 milliGRAM(s) Oral daily    MEDICATIONS  (PRN):  dextrose 40% Gel 15 Gram(s) Oral once PRN Blood Glucose LESS THAN 70 milliGRAM(s)/deciliter  glucagon  Injectable 1 milliGRAM(s) IntraMuscular once PRN Glucose LESS THAN 70 milligrams/deciliter  heparin  Injectable 5600 Unit(s) IV Push every 6 hours PRN For aPTT less than 40      REVIEW OF SYSTEMS:    CONSTITUTIONAL: No fever or chills, No fatigue or tiredness.  EYES: No blurred or double vision.  ENT: No recent URI or sore throat  RESPIRATORY: No shortness of breath, cough, hemoptysis  CARDIOVASCULAR: No Chest pain or shortness of breath  GASTROINTESTINAL: NO abdominal or flank pain, No nausea or vomiting, No diarrhea  GENITOURINARY: No dysuria or urinary burning, No difficulty passing urine, No hematuria  NEUROLOGICAL: No headaches or blurred vision  SKIN: No skin rashes   MUSCULOSKELETAL: No arthralgia, Joint pain, leg edema, No muscle pains    T(C): 36.7 (05-08-19 @ 14:28), Max: 36.9 (05-08-19 @ 07:06)  HR: 84 (05-08-19 @ 13:12) (75 - 84)  BP: 162/82 (05-08-19 @ 15:58) (162/82 - 189/96)  RR: 16 (05-08-19 @ 13:12) (16 - 18)  SpO2: 96% (05-08-19 @ 13:12) (95% - 97%)    PHYSICAL EXAM:  GENERAL: NAD, well-developed, well nourished, alert, awake, no acute distress at present  HEAD:  Atraumatic, Normocephalic,   EYES: Bilateral conjuctiva and sclera  Oral cavity: Oral mucosa dry and pink  NECK: Neck supple, No JVD  CHEST/LUNG: Bilateral clear breath sounds equally; No rals, no crepitations, no wheezing, no rhonchi  HEART: Regular rate and rhythm. LUISITO II/VI at LPSB, No gallops, no rub   ABDOMEN: Soft, Nontender, non distended, bowel sounds present, No flank or supra pubic tenderness.   EXTREMITIES: Peripheral pulses palpable, no pedal edema  Neurology: AAOx3, no focal neurological deficit  SKIN: No rashes or skin lesions      CAPILLARY BLOOD GLUCOSE      POCT Blood Glucose.: 163 mg/dL (08 May 2019 16:10)  POCT Blood Glucose.: 138 mg/dL (08 May 2019 11:02)  POCT Blood Glucose.: 155 mg/dL (08 May 2019 06:50)  POCT Blood Glucose.: 174 mg/dL (07 May 2019 21:10)      I&O's Summary    07 May 2019 07:01  -  08 May 2019 07:00  --------------------------------------------------------  IN: 994 mL / OUT: 1780 mL / NET: -786 mL    08 May 2019 07:01  -  08 May 2019 17:18  --------------------------------------------------------  IN: 120 mL / OUT: 400 mL / NET: -280 mL          LABS:                                                   05-08-19 @ 04:54    141  |  104  |  27<H>  ----------------------------<  157<H>  4.0   |  24  |  2.24<H>                                                 05-07-19 @ 06:10    143  |  105  |  26<H>  ----------------------------<  153<H>  3.5   |  26  |  2.22<H>                                                 05-06-19 @ 07:01    145  |  104  |  26<H>  ----------------------------<  144<H>  3.6   |  29  |  2.18<H>                                                 05-06-19 @ 02:39    146<H>  |  105  |  26<H>  ----------------------------<  183<H>  4.0   |  28  |  2.21<H>    Ca    9.2      08 May 2019 04:54  Ca    9.2      07 May 2019 06:10  Ca    9.1      06 May 2019 07:01  Ca    8.8      06 May 2019 02:39  Phos  3.7     05-08  Phos  3.8     05-07  Phos  3.4     05-06  Mg     1.7     05-08  Mg     1.7     05-07  Mg     1.5     05-06    TPro  6.6  /  Alb  3.6  /  TBili  0.4  /  DBili  <0.2  /  AST  14  /  ALT  12  /  AlkPhos  91  05-06  TPro  6.7  /  Alb  3.6  /  TBili  0.4  /  DBili  x   /  AST  15  /  ALT  13  /  AlkPhos  87  05-06                          11.7   9.33  )-----------( 214      ( 08 May 2019 04:54 )             33.1     CBC Full  -  ( 08 May 2019 04:54 )  WBC Count : 9.33 K/uL  Hemoglobin : 11.7 g/dL  Hematocrit : 33.1 %  Platelet Count - Automated : 214 K/uL  Mean Cell Volume : 88.3 fl      CBC Full  -  ( 07 May 2019 06:10 )  WBC Count : 9.46 K/uL  Hemoglobin : 12.0 g/dL  Hematocrit : 34.5 %  Platelet Count - Automated : 212 K/uL  Mean Cell Volume : 89.4 fl      CBC Full  -  ( 06 May 2019 07:01 )  WBC Count : 8.90 K/uL  Hemoglobin : 12.3 g/dL  Hematocrit : 35.3 %  Platelet Count - Automated : 215 K/uL  Mean Cell Volume : 87.6 fl      CBC Full  -  ( 06 May 2019 02:39 )  WBC Count : 7.32 K/uL  Hemoglobin : 12.0 g/dL  Hematocrit : 34.8 %  Platelet Count - Automated : 209 K/uL  Mean Cell Volume : 88.1 fl        PTT - ( 08 May 2019 04:54 )  PTT:59.8 sec          RADIOLOGY & ADDITIONAL TESTS: Reviewed.  < from: Xray Chest 1 View AP/PA (05.06.19 @ 02:55) >    EXAM:  XR CHEST AP OR PA 1V                          PROCEDURE DATE:  05/06/2019          INTERPRETATION:    XR CHEST AP OR PA 1V dated 5/6/2019 2:55 AM    CLINICAL INFORMATION: Male, 48 years old.  CP.    PRIOR STUDIES: 9/8/2015    FINDINGS: Heartsize, mediastinal and hilar contours are unchanged and   within normal limits. The lung zones are clear. Soft tissues and osseous   structures are intact.    IMPRESSION:  No significant interval change and no acute pulmonary pathology.                "Thank you for the opportunity to participate in the care of this   patient."        ROSA MARIA PASCAL M.D., ATTENDING RADIOLOGIST  This document has been electronically signed. May  7 2019  4:49PM                  < end of copied text >    EKG/Telemetry: Reviewed Patient is a 48y old  Male who presents with a chief complaint of Intermittent, non exertional, mild chest pressure with no associated symptoms this evening.     HPI:  47 y/o male with PMHx of HTN, HPL, renal insufficiency (on admission Creatinine 2.21 no other labs to compare; pt. denies hx of CKD) and DM-2 present to [facility] ER this morning, 5/6/19, complaining of intermittent, non exertional, mild chest pressure associated with no symptoms with uncontrolled hypertension with /108 with elevated Troponin 0.06. According to patient, he was sitting in his chair watching television around 11PM when he suddenly felt mild chest pressure, 5/10 lasting 2-3 minutes with no associated symptoms.  He was concerned because he never experienced chest pressure before and the feeling took his breath away.    Pt. states he had stress test one year ago which was normal and denies any cardiac history including HF.  n ER ECG reveals NSR@71bpm with non specific ST-T wave changes, Troponin 0.06, , BNP 1651, BUN/Creatinine 26/2.21, Blood Glucose 183 and H/H 12.0/34.8.      Patient stats that he has H/o HTN and DM type II for past 10 years duration. ( Last cr 1.9 in June 2018). He denies any difficulty passing urine, flank pain, hematuria, dysuria, foamy urine or other complaints. He stats that he does not check his blood pressure at home and his hba1c was 7.1 by PCP. He denies any leg pain or swelling. He denies any other OTC medications.    PAST MEDICAL & SURGICAL HISTORY:  Hyperlipidemia  Diabetes  Hypertension  History of cholecystectomy  CKD stage III      Allergies    No Known Allergies    Intolerances        FAMILY HISTORY:  No pertinent family history in first degree relatives      SOCIAL HISTORY:    MEDICATIONS  (STANDING):  amLODIPine   Tablet 10 milliGRAM(s) Oral daily  aspirin enteric coated 81 milliGRAM(s) Oral daily  atorvastatin 80 milliGRAM(s) Oral at bedtime  BACItracin   Ointment 1 Application(s) Topical two times a day  carvedilol 6.25 milliGRAM(s) Oral every 12 hours  dextrose 5%. 1000 milliLiter(s) (50 mL/Hr) IV Continuous <Continuous>  dextrose 50% Injectable 12.5 Gram(s) IV Push once  dextrose 50% Injectable 25 Gram(s) IV Push once  dextrose 50% Injectable 25 Gram(s) IV Push once  heparin  Infusion.  Unit(s)/Hr (10 mL/Hr) IV Continuous <Continuous>  hydrALAZINE 25 milliGRAM(s) Oral three times a day  insulin lispro (HumaLOG) corrective regimen sliding scale   SubCutaneous Before meals and at bedtime  isosorbide   mononitrate ER Tablet (IMDUR) 90 milliGRAM(s) Oral daily  magnesium oxide 400 milliGRAM(s) Oral daily    MEDICATIONS  (PRN):  dextrose 40% Gel 15 Gram(s) Oral once PRN Blood Glucose LESS THAN 70 milliGRAM(s)/deciliter  glucagon  Injectable 1 milliGRAM(s) IntraMuscular once PRN Glucose LESS THAN 70 milligrams/deciliter  heparin  Injectable 5600 Unit(s) IV Push every 6 hours PRN For aPTT less than 40      REVIEW OF SYSTEMS:    CONSTITUTIONAL: No fever or chills, No fatigue or tiredness.  EYES: No blurred or double vision.  ENT: No recent URI or sore throat  RESPIRATORY: Mild SOB, denies any cough, hemoptysis  CARDIOVASCULAR: Intermittent chest pain, shortness of breath, no leg eema  GASTROINTESTINAL: No abdominal or flank pain, No nausea or vomiting, No diarrhea  GENITOURINARY: No dysuria or urinary burning, No difficulty passing urine, No hematuria  NEUROLOGICAL: No headaches or blurred vision  SKIN: No skin rashes   MUSCULOSKELETAL: No arthralgia, Joint pain, leg edema, No muscle pains    T(C): 36.7 (05-08-19 @ 14:28), Max: 36.9 (05-08-19 @ 07:06)  HR: 84 (05-08-19 @ 13:12) (75 - 84)  BP: 162/82 (05-08-19 @ 15:58) (162/82 - 189/96)  RR: 16 (05-08-19 @ 13:12) (16 - 18)  SpO2: 96% (05-08-19 @ 13:12) (95% - 97%)    PHYSICAL EXAM:  GENERAL: NAD, well-developed, well nourished, alert, awake, no acute distress at present  HEAD:  Atraumatic, Normocephalic,   EYES: Bilateral conjunctival and sclera normal  Oral cavity: Oral mucosa dry and pink  NECK: Neck supple, No JVD  CHEST/LUNG: Bilateral clear breath sounds equally; No rales no crepitations, no wheezing, no rhonchi  HEART: Regular rate and rhythm. LUISITO II/VI at LPSB, No gallops, no rub   ABDOMEN: Soft, Nontender, non distended, bowel sounds present, No flank or supra pubic tenderness.   EXTREMITIES: Peripheral pulses palpable, no pedal edema  Neurology: AAOx3, no focal neurological deficit  SKIN: No rashes or skin lesions      CAPILLARY BLOOD GLUCOSE      POCT Blood Glucose.: 163 mg/dL (08 May 2019 16:10)  POCT Blood Glucose.: 138 mg/dL (08 May 2019 11:02)  POCT Blood Glucose.: 155 mg/dL (08 May 2019 06:50)  POCT Blood Glucose.: 174 mg/dL (07 May 2019 21:10)      I&O's Summary    07 May 2019 07:01  -  08 May 2019 07:00  --------------------------------------------------------  IN: 994 mL / OUT: 1780 mL / NET: -786 mL    08 May 2019 07:01  -  08 May 2019 17:18  --------------------------------------------------------  IN: 120 mL / OUT: 400 mL / NET: -280 mL          LABS:                                                   05-08-19 @ 04:54    141  |  104  |  27<H>  ----------------------------<  157<H>  4.0   |  24  |  2.24<H>                                                 05-07-19 @ 06:10    143  |  105  |  26<H>  ----------------------------<  153<H>  3.5   |  26  |  2.22<H>                                                 05-06-19 @ 07:01    145  |  104  |  26<H>  ----------------------------<  144<H>  3.6   |  29  |  2.18<H>                                                 05-06-19 @ 02:39    146<H>  |  105  |  26<H>  ----------------------------<  183<H>  4.0   |  28  |  2.21<H>    Ca    9.2      08 May 2019 04:54  Ca    9.2      07 May 2019 06:10  Ca    9.1      06 May 2019 07:01  Ca    8.8      06 May 2019 02:39  Phos  3.7     05-08  Phos  3.8     05-07  Phos  3.4     05-06  Mg     1.7     05-08  Mg     1.7     05-07  Mg     1.5     05-06    TPro  6.6  /  Alb  3.6  /  TBili  0.4  /  DBili  <0.2  /  AST  14  /  ALT  12  /  AlkPhos  91  05-06  TPro  6.7  /  Alb  3.6  /  TBili  0.4  /  DBili  x   /  AST  15  /  ALT  13  /  AlkPhos  87  05-06                          11.7   9.33  )-----------( 214      ( 08 May 2019 04:54 )             33.1     CBC Full  -  ( 08 May 2019 04:54 )  WBC Count : 9.33 K/uL  Hemoglobin : 11.7 g/dL  Hematocrit : 33.1 %  Platelet Count - Automated : 214 K/uL  Mean Cell Volume : 88.3 fl      CBC Full  -  ( 07 May 2019 06:10 )  WBC Count : 9.46 K/uL  Hemoglobin : 12.0 g/dL  Hematocrit : 34.5 %  Platelet Count - Automated : 212 K/uL  Mean Cell Volume : 89.4 fl      CBC Full  -  ( 06 May 2019 07:01 )  WBC Count : 8.90 K/uL  Hemoglobin : 12.3 g/dL  Hematocrit : 35.3 %  Platelet Count - Automated : 215 K/uL  Mean Cell Volume : 87.6 fl      CBC Full  -  ( 06 May 2019 02:39 )  WBC Count : 7.32 K/uL  Hemoglobin : 12.0 g/dL  Hematocrit : 34.8 %  Platelet Count - Automated : 209 K/uL  Mean Cell Volume : 88.1 fl        PTT - ( 08 May 2019 04:54 )  PTT:59.8 sec          RADIOLOGY & ADDITIONAL TESTS: Reviewed.  < from: Xray Chest 1 View AP/PA (05.06.19 @ 02:55) >    EXAM:  XR CHEST AP OR PA 1V                          PROCEDURE DATE:  05/06/2019          INTERPRETATION:    XR CHEST AP OR PA 1V dated 5/6/2019 2:55 AM    CLINICAL INFORMATION: Male, 48 years old.  CP.    PRIOR STUDIES: 9/8/2015    FINDINGS: Heartsize, mediastinal and hilar contours are unchanged and   within normal limits. The lung zones are clear. Soft tissues and osseous   structures are intact.    IMPRESSION:  No significant interval change and no acute pulmonary pathology.                "Thank you for the opportunity to participate in the care of this   patient."        ROSA MARIA PASCAL M.D., ATTENDING RADIOLOGIST  This document has been electronically signed. May  7 2019  4:49PM                  < end of copied text >    EKG/Telemetry: Reviewed

## 2019-05-08 NOTE — PROGRESS NOTE ADULT - ATTENDING COMMENTS
----- Message from Mary Flores sent at 10/13/2017  1:48 PM CDT -----  Contact: Amalia [facility]  Amalia states she received patient information, however requesting chart notes from April 5th. Please fax to [phone]..phone: [phone].[email or web address]  
Chart notes printed and faxed  
CAD s/p MI--IV heparin  HTN--medication adj  DM--stable
HTN--off NTG drip  CAD--NSTEMI  c. cath  DM-stable

## 2019-05-08 NOTE — PROGRESS NOTE ADULT - PROBLEM SELECTOR PLAN 3
Outpatient labs obtained Cr 1.79 in 5/2018 Cr 2.42 in 1/2019.   Cr 2.24  -Avoid Nephrotoxic Agents   -U/A negative for UTI. U/A+ Blood and Protein  -Monitor BP, urine output, electrolytes and volume status.  -Dr Faust/Renal Fellow consulted. Pt was recommended to F/U with Dr. Faust last year but never followed through.  -Renal US ordered. F/U urine electrolytes

## 2019-05-08 NOTE — PROGRESS NOTE ADULT - PROBLEM SELECTOR PLAN 3
Outpatient labs obtained Cr 1.79 in 5/2018 Cr 2.42 in 1/2019.   Cr 2.24  -Avoid Nephrotoxic Agents   -U/A negative for UTI. U/A+ Blood and Protein  -Monitor BP, urine output, electrolytes and volume status.  -Dr Faust consulted, F/U recs. Pt was recommended to F/U with Dr. Faust last year but never followed through. Outpatient labs obtained Cr 1.79 in 5/2018 Cr 2.42 in 1/2019.   Cr 2.24  -Avoid Nephrotoxic Agents   -U/A negative for UTI. U/A+ Blood and Protein  -Monitor BP, urine output, electrolytes and volume status.  -Dr Faust/Renal Fellow consulted, F/U recs. Pt was recommended to F/U with Dr. Faust last year but never followed through. Outpatient labs obtained Cr 1.79 in 5/2018 Cr 2.42 in 1/2019.   Cr 2.24  -Avoid Nephrotoxic Agents   -U/A negative for UTI. U/A+ Blood and Protein  -Monitor BP, urine output, electrolytes and volume status.  -Dr Faust/Renal Fellow consulted. Pt was recommended to F/U with Dr. Faust last year but never followed through.  -Renal US ordered. F/U urine electrolytes

## 2019-05-08 NOTE — PROGRESS NOTE ADULT - PROBLEM SELECTOR PLAN 7
- - -
-Magnesium 1.7-Magnesium 2 g IV x 1 given.   -Repeat Magnesium 1.7 today.   Given Magnesium 400mg PO stat.    Standing dose of Magnesium 400mg PO daily     -DVT prophylaxis :Heparin drip   Dispo: Pending clinical progression. Patient currently stable awaiting echo and possible ischemic work-up stress vs. cath.
-Magnesium 1.7-Magnesium 2 g IV x 1 given.   -Repeat Magnesium 1.7 today.   Given Magnesium 400mg PO stat.    Standing dose of Magnesium 400mg PO daily     -DVT prophylaxis :Heparin drip   Dispo: Pending clinical progression. Patient currently stable awaiting echo and possible ischemic work-up stress vs. cath.
Magnesium 1.7-Magnesium 2 g IV x 1 given. Follow-up AM Magnesium    DVT Prophylaxis :Heparin drip   Dispo: Pending clinical progression. Patient currently stable awaiting echo and possible ischemic work-up stress vs. cath.
Magnesium 1.7-Magnesium 2 g IV x 1 given. Follow-up AM Magnesium    DVT Prophylaxis :Heparin drip   Dispo: Pending clinical progression. Patient currently stable awaiting echo and possible ischemic work-up stress vs. cath.
Magnesium 1.5-Magnesium 2 g IV q 4 hrs x 2 ordered. Follow-up AM Magnesium    DVT Prophylaxis:Heparin drip   Dispo: Pending clinical progression. Patient currently stable awaiting echo and optimization of BP.

## 2019-05-08 NOTE — CONSULT NOTE ADULT - ASSESSMENT
47 y/o male with PMHx of HTN, HPL, renal insufficiency (on admission Creatinine 2.21 no other labs to compare; pt. denies hx of CKD) and DM-2 present to [facility] ER this morning, 5/6/19, complaining of intermittent, non exertional, mild chest pressure for past 2 days admitted to telemetry for evaluation for CAD. Nephrology consulted for STAN/CKD management    # CKD stage III with stable  S cr 2.2 likely due to long standing Hypertension/DM nephropathy  - Last S cr 1.9 in June 2018  - Strict I/o  - Monitor BMP daily  - Avoid Nephrotoxic agents  - Renal diet  - Avoid NSAIDs/PPI  - Needs optimal BP/DM control.  - Obtain urinalysis, Urine PCR and urine electrolytes  - Would need work up for proteinuria although likely underlying etiology is long standing DM type II.  - Obtain Bilateral renal and bladder sonogram with doppler to assess for ANABELA    # Hypertension poorly controlled:  - Low salt diet  - Weight reduction  - Continue BP medications with hydralazine, imdur, Coreg, amlodipine  - Wound benefit from low dose ACEI/ARB due to proteinuria and long standing DM type II  - Warrants work up for secondary hypertension (Plasma Metanephrines, VMA, Serum aldosterone level, Plamsa renin activity, TSH, Cortisol level)  - Out patient sleep studies for ALMAS/OHS    # Anemia of chronic renal disease with hgb 11.7  - Obtain iron studies  - Monitor hemolgobin  - Check SPEP, Immunofixation, Serum free light chain ratio    #Renal Bone disease  Patient's calcium 9.2 and phos 3.7  Low K/low phos/renal diet  Obtain Intact PTH, Vitamin D 25, Vitamin D 1,25 level   Monitor calcium and phos level    # Anginal chest pain with mild troponin  Work up per cardiology team with NST  Optimal BP/DM control

## 2019-05-08 NOTE — PROGRESS NOTE ADULT - PROBLEM SELECTOR PLAN 6
K+3.5-Kdur 40 mEq Po x2  given. Follow-up AM BMP. K+3.5-Kdur 40 mEq Po x2  given.  K+ 4.0. Hypokalemia resolved.

## 2019-05-09 ENCOUNTER — TRANSCRIPTION ENCOUNTER (OUTPATIENT)
Age: 49
End: 2019-05-09

## 2019-05-09 ENCOUNTER — INBOUND DOCUMENT (OUTPATIENT)
Age: 49
End: 2019-05-09

## 2019-05-09 VITALS — TEMPERATURE: 97 F

## 2019-05-09 LAB
ANION GAP SERPL CALC-SCNC: 12 MMOL/L — SIGNIFICANT CHANGE UP (ref 5–17)
APPEARANCE UR: CLEAR — SIGNIFICANT CHANGE UP
APTT BLD: 62.3 SEC — HIGH (ref 27.5–36.3)
BILIRUB UR-MCNC: NEGATIVE — SIGNIFICANT CHANGE UP
BUN SERPL-MCNC: 29 MG/DL — HIGH (ref 7–23)
CALCIUM SERPL-MCNC: 9.6 MG/DL — SIGNIFICANT CHANGE UP (ref 8.4–10.5)
CHLORIDE SERPL-SCNC: 105 MMOL/L — SIGNIFICANT CHANGE UP (ref 96–108)
CO2 SERPL-SCNC: 23 MMOL/L — SIGNIFICANT CHANGE UP (ref 22–31)
COLOR SPEC: YELLOW — SIGNIFICANT CHANGE UP
CREAT ?TM UR-MCNC: 56 MG/DL — SIGNIFICANT CHANGE UP
CREAT SERPL-MCNC: 2.17 MG/DL — HIGH (ref 0.5–1.3)
DIFF PNL FLD: NEGATIVE — SIGNIFICANT CHANGE UP
GLUCOSE BLDC GLUCOMTR-MCNC: 107 MG/DL — HIGH (ref 70–99)
GLUCOSE BLDC GLUCOMTR-MCNC: 114 MG/DL — HIGH (ref 70–99)
GLUCOSE BLDC GLUCOMTR-MCNC: 154 MG/DL — HIGH (ref 70–99)
GLUCOSE SERPL-MCNC: 163 MG/DL — HIGH (ref 70–99)
GLUCOSE UR QL: NEGATIVE — SIGNIFICANT CHANGE UP
HCT VFR BLD CALC: 34.2 % — LOW (ref 39–50)
HGB BLD-MCNC: 11.9 G/DL — LOW (ref 13–17)
KETONES UR-MCNC: NEGATIVE — SIGNIFICANT CHANGE UP
LEUKOCYTE ESTERASE UR-ACNC: NEGATIVE — SIGNIFICANT CHANGE UP
MAGNESIUM SERPL-MCNC: 1.8 MG/DL — SIGNIFICANT CHANGE UP (ref 1.6–2.6)
MCHC RBC-ENTMCNC: 30.9 PG — SIGNIFICANT CHANGE UP (ref 27–34)
MCHC RBC-ENTMCNC: 34.8 GM/DL — SIGNIFICANT CHANGE UP (ref 32–36)
MCV RBC AUTO: 88.8 FL — SIGNIFICANT CHANGE UP (ref 80–100)
NITRITE UR-MCNC: NEGATIVE — SIGNIFICANT CHANGE UP
NRBC # BLD: 0 /100 WBCS — SIGNIFICANT CHANGE UP (ref 0–0)
OSMOLALITY UR: 187 MOSMOL/KG — SIGNIFICANT CHANGE UP (ref 100–650)
PH UR: 5.5 — SIGNIFICANT CHANGE UP (ref 5–8)
PLATELET # BLD AUTO: 210 K/UL — SIGNIFICANT CHANGE UP (ref 150–400)
POTASSIUM SERPL-MCNC: 4 MMOL/L — SIGNIFICANT CHANGE UP (ref 3.5–5.3)
POTASSIUM SERPL-SCNC: 4 MMOL/L — SIGNIFICANT CHANGE UP (ref 3.5–5.3)
PROT ?TM UR-MCNC: 167 MG/DL — HIGH (ref 0–12)
PROT UR-MCNC: 100 MG/DL
PROT/CREAT UR-RTO: 3 RATIO — HIGH (ref 0–0.2)
RBC # BLD: 3.85 M/UL — LOW (ref 4.2–5.8)
RBC # FLD: 12.6 % — SIGNIFICANT CHANGE UP (ref 10.3–14.5)
SODIUM SERPL-SCNC: 140 MMOL/L — SIGNIFICANT CHANGE UP (ref 135–145)
SODIUM UR-SCNC: 26 MMOL/L — SIGNIFICANT CHANGE UP
SP GR SPEC: 1.01 — SIGNIFICANT CHANGE UP (ref 1–1.03)
TROPONIN T SERPL-MCNC: 0.06 NG/ML — CRITICAL HIGH (ref 0–0.01)
UROBILINOGEN FLD QL: 0.2 E.U./DL — SIGNIFICANT CHANGE UP
WBC # BLD: 7.94 K/UL — SIGNIFICANT CHANGE UP (ref 3.8–10.5)
WBC # FLD AUTO: 7.94 K/UL — SIGNIFICANT CHANGE UP (ref 3.8–10.5)

## 2019-05-09 PROCEDURE — 96374 THER/PROPH/DIAG INJ IV PUSH: CPT

## 2019-05-09 PROCEDURE — 84300 ASSAY OF URINE SODIUM: CPT

## 2019-05-09 PROCEDURE — 93017 CV STRESS TEST TRACING ONLY: CPT

## 2019-05-09 PROCEDURE — 84100 ASSAY OF PHOSPHORUS: CPT

## 2019-05-09 PROCEDURE — 78452 HT MUSCLE IMAGE SPECT MULT: CPT | Mod: 26

## 2019-05-09 PROCEDURE — A9502: CPT

## 2019-05-09 PROCEDURE — 83735 ASSAY OF MAGNESIUM: CPT

## 2019-05-09 PROCEDURE — 80053 COMPREHEN METABOLIC PANEL: CPT

## 2019-05-09 PROCEDURE — 84484 ASSAY OF TROPONIN QUANT: CPT

## 2019-05-09 PROCEDURE — 82553 CREATINE MB FRACTION: CPT

## 2019-05-09 PROCEDURE — 93018 CV STRESS TEST I&R ONLY: CPT

## 2019-05-09 PROCEDURE — 83935 ASSAY OF URINE OSMOLALITY: CPT

## 2019-05-09 PROCEDURE — 83036 HEMOGLOBIN GLYCOSYLATED A1C: CPT

## 2019-05-09 PROCEDURE — 84540 ASSAY OF URINE/UREA-N: CPT

## 2019-05-09 PROCEDURE — 93976 VASCULAR STUDY: CPT

## 2019-05-09 PROCEDURE — 82962 GLUCOSE BLOOD TEST: CPT

## 2019-05-09 PROCEDURE — 93306 TTE W/DOPPLER COMPLETE: CPT

## 2019-05-09 PROCEDURE — 80048 BASIC METABOLIC PNL TOTAL CA: CPT

## 2019-05-09 PROCEDURE — 85730 THROMBOPLASTIN TIME PARTIAL: CPT

## 2019-05-09 PROCEDURE — 93976 VASCULAR STUDY: CPT | Mod: 26

## 2019-05-09 PROCEDURE — 76770 US EXAM ABDO BACK WALL COMP: CPT

## 2019-05-09 PROCEDURE — 78452 HT MUSCLE IMAGE SPECT MULT: CPT

## 2019-05-09 PROCEDURE — 85027 COMPLETE CBC AUTOMATED: CPT

## 2019-05-09 PROCEDURE — 85025 COMPLETE CBC W/AUTO DIFF WBC: CPT

## 2019-05-09 PROCEDURE — 93016 CV STRESS TEST SUPVJ ONLY: CPT

## 2019-05-09 PROCEDURE — 99238 HOSP IP/OBS DSCHRG MGMT 30/<: CPT

## 2019-05-09 PROCEDURE — 99291 CRITICAL CARE FIRST HOUR: CPT | Mod: 25

## 2019-05-09 PROCEDURE — 81001 URINALYSIS AUTO W/SCOPE: CPT

## 2019-05-09 PROCEDURE — 36415 COLL VENOUS BLD VENIPUNCTURE: CPT

## 2019-05-09 PROCEDURE — 76770 US EXAM ABDO BACK WALL COMP: CPT | Mod: 26,59

## 2019-05-09 PROCEDURE — 93010 ELECTROCARDIOGRAM REPORT: CPT

## 2019-05-09 PROCEDURE — 83880 ASSAY OF NATRIURETIC PEPTIDE: CPT

## 2019-05-09 PROCEDURE — 84156 ASSAY OF PROTEIN URINE: CPT

## 2019-05-09 PROCEDURE — 82570 ASSAY OF URINE CREATININE: CPT

## 2019-05-09 PROCEDURE — 93005 ELECTROCARDIOGRAM TRACING: CPT

## 2019-05-09 PROCEDURE — 85610 PROTHROMBIN TIME: CPT

## 2019-05-09 PROCEDURE — 71045 X-RAY EXAM CHEST 1 VIEW: CPT

## 2019-05-09 PROCEDURE — 82550 ASSAY OF CK (CPK): CPT

## 2019-05-09 PROCEDURE — 82248 BILIRUBIN DIRECT: CPT

## 2019-05-09 PROCEDURE — 80061 LIPID PANEL: CPT

## 2019-05-09 RX ORDER — ATORVASTATIN CALCIUM 80 MG/1
1 TABLET, FILM COATED ORAL
Qty: 30 | Refills: 3
Start: 2019-05-09 | End: 2019-09-05

## 2019-05-09 RX ORDER — LOSARTAN POTASSIUM 100 MG/1
50 TABLET, FILM COATED ORAL
Qty: 0 | Refills: 3 | DISCHARGE
Start: 2019-05-09 | End: 2019-09-05

## 2019-05-09 RX ORDER — LOSARTAN POTASSIUM 100 MG/1
25 TABLET, FILM COATED ORAL DAILY
Refills: 0 | Status: DISCONTINUED | OUTPATIENT
Start: 2019-05-09 | End: 2019-05-09

## 2019-05-09 RX ORDER — AMLODIPINE BESYLATE 2.5 MG/1
1 TABLET ORAL
Qty: 30 | Refills: 0
Start: 2019-05-09 | End: 2019-06-07

## 2019-05-09 RX ORDER — ASPIRIN/CALCIUM CARB/MAGNESIUM 324 MG
1 TABLET ORAL
Qty: 30 | Refills: 11
Start: 2019-05-09 | End: 2020-05-02

## 2019-05-09 RX ORDER — MAGNESIUM OXIDE 400 MG ORAL TABLET 241.3 MG
1 TABLET ORAL
Qty: 30 | Refills: 3
Start: 2019-05-09 | End: 2019-09-05

## 2019-05-09 RX ORDER — ISOSORBIDE MONONITRATE 60 MG/1
3 TABLET, EXTENDED RELEASE ORAL
Qty: 90 | Refills: 3
Start: 2019-05-09 | End: 2019-09-05

## 2019-05-09 RX ORDER — TRIAMTERENE/HYDROCHLOROTHIAZID 75 MG-50MG
1 TABLET ORAL
Qty: 0 | Refills: 0 | DISCHARGE

## 2019-05-09 RX ORDER — LOSARTAN POTASSIUM 100 MG/1
1 TABLET, FILM COATED ORAL
Qty: 30 | Refills: 3
Start: 2019-05-09 | End: 2019-09-05

## 2019-05-09 RX ORDER — LOSARTAN POTASSIUM 100 MG/1
0 TABLET, FILM COATED ORAL
Qty: 0 | Refills: 3 | DISCHARGE
Start: 2019-05-09 | End: 2019-09-05

## 2019-05-09 RX ORDER — ATORVASTATIN CALCIUM 80 MG/1
1 TABLET, FILM COATED ORAL
Qty: 0 | Refills: 0 | DISCHARGE

## 2019-05-09 RX ORDER — ASPIRIN/CALCIUM CARB/MAGNESIUM 324 MG
81 TABLET ORAL
Qty: 0 | Refills: 11 | DISCHARGE
Start: 2019-05-09 | End: 2020-05-02

## 2019-05-09 RX ORDER — SITAGLIPTIN AND METFORMIN HYDROCHLORIDE 500; 50 MG/1; MG/1
1 TABLET, FILM COATED ORAL
Qty: 0 | Refills: 0 | DISCHARGE

## 2019-05-09 RX ORDER — NEBIVOLOL HYDROCHLORIDE 5 MG/1
1 TABLET ORAL
Qty: 0 | Refills: 0 | DISCHARGE

## 2019-05-09 RX ORDER — HYDRALAZINE HCL 50 MG
1 TABLET ORAL
Qty: 90 | Refills: 3
Start: 2019-05-09 | End: 2019-09-05

## 2019-05-09 RX ORDER — ASPIRIN/CALCIUM CARB/MAGNESIUM 324 MG
1 TABLET ORAL
Qty: 0 | Refills: 0 | DISCHARGE

## 2019-05-09 RX ORDER — CARVEDILOL PHOSPHATE 80 MG/1
1 CAPSULE, EXTENDED RELEASE ORAL
Qty: 60 | Refills: 3
Start: 2019-05-09 | End: 2019-09-05

## 2019-05-09 RX ADMIN — CARVEDILOL PHOSPHATE 6.25 MILLIGRAM(S): 80 CAPSULE, EXTENDED RELEASE ORAL at 17:34

## 2019-05-09 RX ADMIN — Medication 1 APPLICATION(S): at 18:44

## 2019-05-09 RX ADMIN — Medication 25 MILLIGRAM(S): at 05:17

## 2019-05-09 RX ADMIN — MAGNESIUM OXIDE 400 MG ORAL TABLET 400 MILLIGRAM(S): 241.3 TABLET ORAL at 13:40

## 2019-05-09 RX ADMIN — Medication 25 MILLIGRAM(S): at 13:50

## 2019-05-09 RX ADMIN — AMLODIPINE BESYLATE 10 MILLIGRAM(S): 2.5 TABLET ORAL at 05:16

## 2019-05-09 RX ADMIN — Medication 1 APPLICATION(S): at 05:16

## 2019-05-09 RX ADMIN — Medication 81 MILLIGRAM(S): at 13:41

## 2019-05-09 RX ADMIN — Medication 2: at 07:05

## 2019-05-09 RX ADMIN — LOSARTAN POTASSIUM 25 MILLIGRAM(S): 100 TABLET, FILM COATED ORAL at 13:40

## 2019-05-09 RX ADMIN — CARVEDILOL PHOSPHATE 6.25 MILLIGRAM(S): 80 CAPSULE, EXTENDED RELEASE ORAL at 05:16

## 2019-05-09 RX ADMIN — ISOSORBIDE MONONITRATE 90 MILLIGRAM(S): 60 TABLET, EXTENDED RELEASE ORAL at 13:43

## 2019-05-09 NOTE — DISCHARGE NOTE PROVIDER - CARE PROVIDER_API CALL
Maria De Jesus Duff)  Internal Medicine; Nephrology  [address] [place] [address]  Phone: [phone]  Fax: [phone]  Follow Up Time:     Mahesh Rivera)  Internal Medicine  [address] [id number]  Phone: [phone]  Fax: [phone]  Follow Up Time:

## 2019-05-09 NOTE — DISCHARGE NOTE PROVIDER - NSDCCPCAREPLAN_GEN_ALL_CORE_FT
PRINCIPAL DISCHARGE DIAGNOSIS  Diagnosis: Non-ST elevation MI (NSTEMI)  Assessment and Plan of Treatment: You had a heart attacked with peaked trops 0.6.  Pt was medically managed on Heparin gtt, and is to continue Aspirin, Carvedilol, Amlodipine, Losartan, Imdur and Lipitor.    -ECHO done showed normal EF.   -Nuclear Stress Test shows no      SECONDARY DISCHARGE DIAGNOSES  Diagnosis: DM (diabetes mellitus)  Assessment and Plan of Treatment:     Diagnosis: Chronic kidney disease (CKD)  Assessment and Plan of Treatment: You were evaluated by Renal.  Renal US done to rule out Renal Artery Stenosis: negative study. Please follow up with Dr. Faust in 1-2 weeks    Diagnosis: Hypertensive emergency  Assessment and Plan of Treatment: Resolved.   Your Blood Pressure is now stable 163/85, HR 80's.  Continue all blood pressure medications as prescribed. PRINCIPAL DISCHARGE DIAGNOSIS  Diagnosis: Non-ST elevation MI (NSTEMI)  Assessment and Plan of Treatment: You had a heart attacked with peaked trops 0.6.  Pt was medically managed on Heparin gtt, and is to continue Aspirin, Carvedilol, Amlodipine, Losartan, Imdur and Lipitor.    -ECHO done showed normal EF.   -Nuclear Stress Test shows no      SECONDARY DISCHARGE DIAGNOSES  Diagnosis: DM (diabetes mellitus)  Assessment and Plan of Treatment: Please follow with outpatient PMD for monitor of blood glucose.    Diagnosis: Chronic kidney disease (CKD)  Assessment and Plan of Treatment: You were evaluated by Renal.  Renal US done to rule out Renal Artery Stenosis: negative study. Please follow up with Dr. Faust in 1-2 weeks    Diagnosis: Hypertensive emergency  Assessment and Plan of Treatment: Resolved.   Your Blood Pressure is now stable 163/85, HR 80's.  Continue all blood pressure medications as prescribed. PRINCIPAL DISCHARGE DIAGNOSIS  Diagnosis: Non-ST elevation MI (NSTEMI)  Assessment and Plan of Treatment: You had a heart attacked with peaked trops 0.6.  Pt was medically managed on Heparin gtt, and is to continue Aspirin, Carvedilol, Amlodipine, Losartan, Imdur and Lipitor.  No Plavix given as NSTEMI was in the setting of Hypertensive Emergency.   -ECHO done showed normal EF.   -Nuclear Stress Test shows normal pefusion scan  Please follow up with Dr. Chicas in 1-2 weeks.      SECONDARY DISCHARGE DIAGNOSES  Diagnosis: DM (diabetes mellitus)  Assessment and Plan of Treatment: Pt was found with Proteinura on recent Urine electrolytes.   You were started on Losartan for kidney protection.  We discontinued your Janumet due to increased Cr >2.3.  Please follow with outpatient PMD or Endocrinologist for monitor of blood glucose. and new diabetic medications.    Diagnosis: Chronic kidney disease (CKD)  Assessment and Plan of Treatment: You were evaluated by Renal team.  Renal US done to rule out Renal Artery Stenosis: negative study. Please follow up with Dr. Faust in 1-2 weeks    Diagnosis: Hypertensive emergency  Assessment and Plan of Treatment: Resolved.   Your Blood Pressure is now stable 163/85, HR 80's.  Continue all blood pressure medications as prescribed.

## 2019-05-09 NOTE — DISCHARGE NOTE PROVIDER - REASON FOR ADMISSION
Intermittent, non exertional, mild chest pressure with no associated symptoms this evening.  In ER pt. noted to be HTN Emergency 212/108 with Troponin 0.06.

## 2019-05-09 NOTE — PROGRESS NOTE ADULT - SUBJECTIVE AND OBJECTIVE BOX
Patient is a 48y Male seen and evaluated at bedside. Patient lying in bed in no distress. Denies any chest pain, shortness of breath. Renal function stable with S cr 2.1 at present. Blood pressure still remains elevated ranging from 170 to 180's at present.      amLODIPine   Tablet 10 daily  aspirin enteric coated 81 daily  atorvastatin 80 at bedtime  BACItracin   Ointment 1 two times a day  carvedilol 6.25 every 12 hours  dextrose 40% Gel 15 once PRN  dextrose 5%. 1000 <Continuous>  dextrose 50% Injectable 12.5 once  dextrose 50% Injectable 25 once  dextrose 50% Injectable 25 once  glucagon  Injectable 1 once PRN  heparin  Infusion.  <Continuous>  heparin  Injectable 5600 every 6 hours PRN  hydrALAZINE 25 three times a day  insulin lispro (HumaLOG) corrective regimen sliding scale  Before meals and at bedtime  isosorbide   mononitrate ER Tablet (IMDUR) 90 daily  losartan 25 daily  magnesium oxide 400 daily      Allergies    No Known Allergies    Intolerances        T(C): , Max: 37.3 (19 @ 07:11)  T(F): , Max: 99.2 (19 @ 07:11)  HR: 77 (19 @ 13:42)  BP: 173/98 (19 @ 13:42)  BP(mean): --  RR: 16 (19 @ 13:42)  SpO2: 96% (19 @ 08:52)  Wt(kg): --     @ 07:01  -   @ 07:00  --------------------------------------------------------  IN: 384 mL / OUT: 2600 mL / NET: -2216 mL     @ 07:01  -   @ 14:11  --------------------------------------------------------  IN: 0 mL / OUT: 0 mL / NET: 0 mL          Review of Systems:  CONSTITUTIONAL: No fever or chills.  EYES: No blurred or double vision.  RESPIRATORY: No shortness of breath, cough, hemoptysis  CARDIOVASCULAR: No Chest pain or shortness of breath  GASTROINTESTINAL: NO abdominal or flank pain, No nausea or vomiting, No diarrhea  GENITOURINARY: No dysuria or urinary burning, No difficulty passing urine, No hematuria  NEUROLOGICAL: No headaches or blurred vision  SKIN: No skin rashes   MUSCULOSKELETAL: No leg edema, no cyanosis, no clubbing      PHYSICAL EXAM:  GENERAL: NAD, well-developed, well nourished, alert, awake, no acute distress at present  HEAD:  Atraumatic, Normocephalic,   EYES: Bilateral conjunctival and sclera normal   Oral cavity: Oral mucosa dry and pink  NECK: Neck supple, No JVD  CHEST/LUNG: Clear to auscultation bilaterally; No wheeze, no rales, no crepitations  HEART: Regular rate and rhythm. LUISITO II/VI at LPSB, No gallop, no rub   ABDOMEN: Soft, Nontender, non distended, bowel sounds present, No flank tenderness.   EXTREMITIES: No clubbing, cyanosis, or edema  Neurology: AAOx3, no focal neurological deficit  SKIN: No rashes or lesions      LABS:                        11.9   7.94  )-----------( 210      ( 09 May 2019 07:19 )             34.2     -    140  |  105  |  29<H>  ----------------------------<  163<H>  4.0   |  23  |  2.17<H>    Ca    9.6      09 May 2019 07:19  Phos  3.7     05-08  Mg     1.8     -        PTT - ( 09 May 2019 07:19 )  PTT:62.3 sec  Urinalysis Basic - ( 09 May 2019 04:05 )    Color: Yellow / Appearance: Clear / S.010 / pH: x  Gluc: x / Ketone: NEGATIVE  / Bili: Negative / Urobili: 0.2 E.U./dL   Blood: x / Protein: 100 mg/dL / Nitrite: NEGATIVE   Leuk Esterase: NEGATIVE / RBC: < 5 /HPF / WBC < 5 /HPF   Sq Epi: x / Non Sq Epi: 0-5 /HPF / Bacteria: Present /HPF      Creatinine, Random Urine: 56 mg/dL ( @ 04:06)  Protein/Creatinine Ratio Calculation: 3.0 Ratio ( @ 04:06)  Sodium, Random Urine: 26 mmol/L ( @ 04:06)  Osmolality, Random Urine: 187 mosmol/kg ( @ 04:06)        RADIOLOGY & ADDITIONAL STUDIES:  < from: Xray Chest 1 View AP/PA (19 @ 02:55) >    EXAM:  XR CHEST AP OR PA 1V                          PROCEDURE DATE:  2019          INTERPRETATION:    XR CHEST AP OR PA 1V dated 2019 2:55 AM    CLINICAL INFORMATION: Male, 48 years old.  CP.    PRIOR STUDIES: 2015    FINDINGS: Heartsize, mediastinal and hilar contours are unchanged and   within normal limits. The lung zones are clear. Soft tissues and osseous   structures are intact.    IMPRESSION:  No significant interval change and no acute pulmonary pathology.                "Thank you for the opportunity to participate in the care of this   patient."        ROSA MARIA PASCAL M.D., ATTENDING RADIOLOGIST  This document has been electronically signed. May  7 2019  4:49PM                  < end of copied text >

## 2019-05-09 NOTE — PROGRESS NOTE ADULT - ASSESSMENT
47 y/o male with PMHx of HTN, HPL, renal insufficiency (on admission Creatinine 2.21 no other labs to compare; pt. denies hx of CKD) and DM-2 present to [facility] ER this morning, 5/6/19, complaining of intermittent, non exertional, mild chest pressure for past 2 days admitted to telemetry for evaluation for CAD. Nephrology consulted for STAN/CKD management    # STAN on CKD stage III with stable  S cr 2.1 likely due to long standing Hypertension/DM nephropathy  - Strict I/o  - Monitor BMP daily  - Avoid Nephrotoxic agents  - Renal diet  - Avoid NSAIDs/PPI  - Needs optimal BP/DM control.  - Urinalysis with 2+ protein and no RBC, no WBC with urine PCR 3.0 gm  - Await Bilateral renal and bladder sonogram with doppler to assess for ANABELA    # Hypertension poorly controlled:  - Low salt diet  - Weight reduction  - Continue BP medications with hydralazine, imdur, amlodipine  - Increase coreg to 12 .5 mg po q 12hrly for now  - Patient started on ARB with losartan 25 mg po daily for hypertension  - Warrants work up for secondary hypertension (Plasma Metanephrines, VMA, Serum aldosterone level, Plasma renin activity, TSH, Cortisol level)  - Out patient sleep studies for ALMAS/OHS    # Anemia of chronic renal disease with hgb 11.7  - Obtain iron studies  - Monitor hemolgobin  - Check SPEP, Immunofixation, Serum free light chain ratio    #Renal Bone disease  Patient's calcium 9.6 and phos 3.7  Low K/low phos/renal diet  Obtain Intact PTH, Vitamin D 25, Vitamin D 1,25 level   Monitor calcium and phos level    # Anginal chest pain with mild troponin  Await NST results for assessment of angina.  Optimal BP/DM control

## 2019-05-09 NOTE — DISCHARGE NOTE NURSING/CASE MANAGEMENT/SOCIAL WORK - NSDCDPATPORTLINK_GEN_ALL_CORE
You can access the [facility] Patient Portal, offered by [facility], by registering with the following website: [email or web address]

## 2019-05-09 NOTE — DISCHARGE NOTE PROVIDER - CARE PROVIDERS DIRECT ADDRESSES
[email or web address]

## 2019-05-09 NOTE — DISCHARGE NOTE PROVIDER - HOSPITAL COURSE
49 y/o male with PMHx of HTN, HPL, renal insufficiency (on admission Creatinine 2.21 no other labs to compare; pt. denies hx of CKD) and DM-2 present to [facility] ER this morning, 5/6/19, complaining of intermittent, non exertional, mild chest pressure associated with no symptoms last night 11PM.   In ER Pt.'s BP noted to be 212/108 with elevated Troponin 0.06; pt. reports  not taking his medication on Saturday; otherwise he is compliant with his BP medication.           According to patient, he was sitting in his chair watching television around 11PM when he suddenly felt mild chest pressure, 5/10 lasting 2-3 minutes with no associated symptoms.  He was concerned because he never experienced chest pressure before and the feeling took his breath away.    Pt. states he had stress test one year ago which was normal and denies any cardiac history including HF.  Pt. also denies family hx of cardiac disease.           In ER ECG reveals NSR@71bpm with non specific ST-T wave changes, Troponin 0.06, , BNP 1651, BUN/Creatinine 26/2.21, Blood Glucose 183 and H/H 12.0/34.8.  CXR reveals no acute pathology f/u official report.  BP noted to be 212/108; pt. was given Labetalol 10mg IV X1 reducing 's an additional Lopressor 5mg IV push given.  Pt. ruled in NSTEMI in setting HTN Emergency Heparin gtt started and given Aspirin Ec 325mg PO X1.        In light of patient ruling in NSTEMI in setting HTN Emergency, hx and symptoms pt. admitted to [facility] for telemetry, ECG, serial CE, Echocardiogram, NPO for further cardiac workup to rule out ACS and HTN management.  Holding pt.'s Triamterene/HCTz (37.5-25mg) in setting of elevated CK  2.21.  pt. denies hx of CKD.  Discuss further with Dr. Chicas 49 y/o male with PMHx of HTN, HPL, renal insufficiency (on admission Creatinine 2.21 no other labs to compare; pt. denies hx of CKD) and DM-2 present to [facility] ER this morning, 5/6/19, complaining of intermittent, non exertional, mild chest pressure associated with no symptoms last night 11PM.   In ER Pt.'s BP noted to be 212/108 with elevated Troponin 0.06; pt. reports  not taking his medication on Saturday; otherwise he is compliant with his BP medication.   According to patient, he was sitting in his chair watching television around 11PM when he suddenly felt mild chest pressure, 5/10 lasting 2-3 minutes with no associated symptoms.  He was concerned because he never experienced chest pressure before and the feeling took his breath away.    Pt. states he had stress test one year ago which was normal and denies any cardiac history including HF.  Pt. also denies family hx of cardiac disease.   In ER ECG reveals NSR@71bpm with non specific ST-T wave changes, Troponin 0.06, , BNP 1651, BUN/Creatinine 26/2.21, Blood Glucose 183 and H/H 12.0/34.8.  CXR reveals no acute pathology. BP noted to be 212/108; pt. was given Labetalol 10mg IV X1 reducing 's an additional Lopressor 5mg IV push given.  Pt. ruled in NSTEMI in setting HTN Emergency  withHeparin gtt started and given Aspirin Ec 325mg given.     Pt was NSTEMI was medically managed throughout admission with Heparin gtt, ASA, Losartan, Coreg, Imdur and Lipitor. No Plavix given as NSTEMI was in the setting of Hypertensive Emergency.  ECHO done showed normal EF. NST showed normal perfusion scan.  Pt is to follow up with Dr. Rivera I 1-2 weeks.  Renal consulted for CKD. Renal US done showed increased renal echogenicity, consistent with renal disease. Pt nephrotoxic agents (triameterend/HCTZ) were held and pt is advised to follow up with Renal, Dr. Faust in 1-2 weeks.  Also pt is a known Diabetic, was on Janumet at home, now held given worsening CKD.  He is recommended to follow up with his PMD/Endocrinologist in 1-2 weeks for diabetic medication adjustment.  Pt seen evaluated at bedside, VSS. Labs WNL and is found stable for discharge to home per Dr. Rivera.

## 2019-05-10 ENCOUNTER — INBOUND DOCUMENT (OUTPATIENT)
Age: 49
End: 2019-05-10

## 2019-05-12 PROBLEM — E78.5 HYPERLIPIDEMIA, UNSPECIFIED: Chronic | Status: ACTIVE | Noted: 2019-05-06

## 2019-05-22 ENCOUNTER — INBOUND DOCUMENT (OUTPATIENT)
Age: 49
End: 2019-05-22

## 2019-05-22 DIAGNOSIS — I25.10 ATHEROSCLEROTIC HEART DISEASE OF NATIVE CORONARY ARTERY WITHOUT ANGINA PECTORIS: ICD-10-CM

## 2019-05-22 DIAGNOSIS — I21.4 NON-ST ELEVATION (NSTEMI) MYOCARDIAL INFARCTION: ICD-10-CM

## 2019-05-22 DIAGNOSIS — E87.6 HYPOKALEMIA: ICD-10-CM

## 2019-05-22 DIAGNOSIS — N18.3 CHRONIC KIDNEY DISEASE, STAGE 3 (MODERATE): ICD-10-CM

## 2019-05-22 DIAGNOSIS — Z90.49 ACQUIRED ABSENCE OF OTHER SPECIFIED PARTS OF DIGESTIVE TRACT: ICD-10-CM

## 2019-05-22 DIAGNOSIS — I16.1 HYPERTENSIVE EMERGENCY: ICD-10-CM

## 2019-05-22 DIAGNOSIS — E83.42 HYPOMAGNESEMIA: ICD-10-CM

## 2019-05-22 DIAGNOSIS — E78.5 HYPERLIPIDEMIA, UNSPECIFIED: ICD-10-CM

## 2019-05-22 DIAGNOSIS — I12.9 HYPERTENSIVE CHRONIC KIDNEY DISEASE WITH STAGE 1 THROUGH STAGE 4 CHRONIC KIDNEY DISEASE, OR UNSPECIFIED CHRONIC KIDNEY DISEASE: ICD-10-CM

## 2019-05-22 DIAGNOSIS — Z79.84 LONG TERM (CURRENT) USE OF ORAL HYPOGLYCEMIC DRUGS: ICD-10-CM

## 2019-05-22 DIAGNOSIS — N25.0 RENAL OSTEODYSTROPHY: ICD-10-CM

## 2019-05-22 DIAGNOSIS — E11.21 TYPE 2 DIABETES MELLITUS WITH DIABETIC NEPHROPATHY: ICD-10-CM

## 2019-05-22 DIAGNOSIS — E11.22 TYPE 2 DIABETES MELLITUS WITH DIABETIC CHRONIC KIDNEY DISEASE: ICD-10-CM

## 2019-05-22 DIAGNOSIS — D63.1 ANEMIA IN CHRONIC KIDNEY DISEASE: ICD-10-CM

## 2019-05-22 DIAGNOSIS — Z79.82 LONG TERM (CURRENT) USE OF ASPIRIN: ICD-10-CM

## 2019-05-22 DIAGNOSIS — N17.9 ACUTE KIDNEY FAILURE, UNSPECIFIED: ICD-10-CM

## 2019-05-24 ENCOUNTER — APPOINTMENT (OUTPATIENT)
Dept: NEPHROLOGY | Facility: CLINIC | Age: 49
End: 2019-05-24
Payer: COMMERCIAL

## 2019-05-24 VITALS — HEART RATE: 86 BPM | DIASTOLIC BLOOD PRESSURE: 94 MMHG | SYSTOLIC BLOOD PRESSURE: 156 MMHG

## 2019-05-24 LAB
ALBUMIN SERPL ELPH-MCNC: 3.8 G/DL
ANION GAP SERPL CALC-SCNC: 13 MMOL/L
APPEARANCE: CLEAR
BACTERIA: NEGATIVE
BASOPHILS # BLD AUTO: 0.08 K/UL
BASOPHILS NFR BLD AUTO: 1.2 %
BILIRUBIN URINE: NEGATIVE
BLOOD URINE: NORMAL
BUN SERPL-MCNC: 34 MG/DL
CALCIUM SERPL-MCNC: 9.3 MG/DL
CHLORIDE SERPL-SCNC: 108 MMOL/L
CO2 SERPL-SCNC: 22 MMOL/L
COLOR: NORMAL
CREAT SERPL-MCNC: 2.22 MG/DL
CREAT SPEC-SCNC: 60 MG/DL
CREAT/PROT UR: 3.8 RATIO
EOSINOPHIL # BLD AUTO: 0.28 K/UL
EOSINOPHIL NFR BLD AUTO: 4.1 %
GLUCOSE QUALITATIVE U: ABNORMAL
GLUCOSE SERPL-MCNC: 220 MG/DL
HCT VFR BLD CALC: 34.9 %
HGB BLD-MCNC: 11.9 G/DL
HYALINE CASTS: 3 /LPF
IMM GRANULOCYTES NFR BLD AUTO: 0.3 %
KETONES URINE: NEGATIVE
LEUKOCYTE ESTERASE URINE: NEGATIVE
LYMPHOCYTES # BLD AUTO: 2.1 K/UL
LYMPHOCYTES NFR BLD AUTO: 30.9 %
MAN DIFF?: NORMAL
MCHC RBC-ENTMCNC: 30.8 PG
MCHC RBC-ENTMCNC: 34.1 GM/DL
MCV RBC AUTO: 90.4 FL
MICROSCOPIC-UA: NORMAL
MONOCYTES # BLD AUTO: 0.52 K/UL
MONOCYTES NFR BLD AUTO: 7.6 %
NEUTROPHILS # BLD AUTO: 3.8 K/UL
NEUTROPHILS NFR BLD AUTO: 55.9 %
NITRITE URINE: NEGATIVE
PH URINE: 6
PHOSPHATE SERPL-MCNC: 4.1 MG/DL
PLATELET # BLD AUTO: 282 K/UL
POTASSIUM SERPL-SCNC: 4 MMOL/L
PROT UR-MCNC: 228 MG/DL
PROTEIN URINE: ABNORMAL
RBC # BLD: 3.86 M/UL
RBC # FLD: 12.6 %
RED BLOOD CELLS URINE: 3 /HPF
SODIUM SERPL-SCNC: 143 MMOL/L
SPECIFIC GRAVITY URINE: 1.01
SQUAMOUS EPITHELIAL CELLS: 1 /HPF
UROBILINOGEN URINE: NORMAL
WBC # FLD AUTO: 6.8 K/UL
WHITE BLOOD CELLS URINE: 1 /HPF

## 2019-05-24 PROCEDURE — 99214 OFFICE O/P EST MOD 30 MIN: CPT

## 2019-05-24 RX ORDER — VALSARTAN 40 MG/1
TABLET ORAL
Refills: 0 | Status: DISCONTINUED | COMMUNITY
End: 2019-05-24

## 2019-05-24 RX ORDER — SAXAGLIPTIN AND METFORMIN HYDROCHLORIDE 2.5; 1 MG/1; MG/1
2.5-1 TABLET, FILM COATED, EXTENDED RELEASE ORAL DAILY
Refills: 0 | Status: DISCONTINUED | COMMUNITY
Start: 2017-06-06 | End: 2019-05-24

## 2019-05-24 RX ORDER — ATORVASTATIN CALCIUM 80 MG/1
TABLET, FILM COATED ORAL
Refills: 0 | Status: DISCONTINUED | COMMUNITY
End: 2019-05-24

## 2019-05-24 RX ORDER — NEBIVOLOL HYDROCHLORIDE 20 MG/1
TABLET ORAL
Refills: 0 | Status: DISCONTINUED | COMMUNITY
End: 2019-05-24

## 2019-05-24 NOTE — ASSESSMENT
[FreeTextEntry1] : all lab data was reviewed with patient in detail from from [facility] admission last week and outpatient repeat data from 5/22/2019\par 49 yo man  with STAN, CKD 3, uncontrolled HTN, proteinuria, DMT2\par --HTN- detailed discussion re need to optimize BP, reduce salt intake- reviewed medication changes- \par off dyazide since admitted to hospital- may need to resume\par -STAN- creatinine flat- okay for now- no increase in RAAS as yet-\par -proteinuria-- nephrotic range- optimize BP and A1C, \par look for presence or absence of diabetic retinopathy - if no, will send serologies; will need to increase losartan at one point but will follow trend in creatinine for now--\par --DM- discussed need to optimize A1C\par --CKD 3- once A1C improved, will need to limit protein intake; optimize BP, reduce proteinuria; discussed progressive nature of CKD\par --HLD - continue with atorvastatin \par \par f/u OV 3 months

## 2019-05-24 NOTE — HISTORY OF PRESENT ILLNESS
[FreeTextEntry1] : 49 yo AA man here for f/u evaluation of HTN, CKD 3, proteinuria, DMT2,\par recently seen in [facility] for accelerated HTN and found to have STAN- with increase in creat to  2.4 from 1.79 baseline. Is aware that he has not been focused on BP and also A1C elevated-\par + foamy urine\par No overt h/o diabetic retinopathy- for f/u ophtho exam soon.\par no flank pain, dysuria or hematuria\par No NSAID use\par

## 2019-05-24 NOTE — REASON FOR VISIT
Assisted OOB & ambulated to BR & on unit. Tolerated activity without difficulty. Left wrist without bleeding or hematoma [Follow-Up] : a follow-up visit [Family Member] : family member [Other: _____] : [unfilled] [FreeTextEntry1] : for HTN, DM type 2, CKD 3

## 2019-05-24 NOTE — PHYSICAL EXAM
[Sclera] : the sclera and conjunctiva were normal [General Appearance - Alert] : alert [Outer Ear] : the ears and nose were normal in appearance [Neck Appearance] : the appearance of the neck was normal [Apical Impulse] : the apical impulse was normal [Abnormal Walk] : normal gait [No Focal Deficits] : no focal deficits [Oriented To Time, Place, And Person] : oriented to person, place, and time [General Appearance - In No Acute Distress] : in no acute distress [Extraocular Movements] : extraocular movements were intact [Examination Of The Oral Cavity] : the lips and gums were normal [Neck Cervical Mass (___cm)] : no neck mass was observed [Auscultation Breath Sounds / Voice Sounds] : lungs were clear to auscultation bilaterally [Heart Sounds] : normal S1 and S2 [Heart Rate And Rhythm] : heart rate was normal and rhythm regular [Heart Sounds Pericardial Friction Rub] : no pericardial rub [Murmurs] : no murmurs [Heart Sounds Gallop] : no gallops [Cervical Lymph Nodes Enlarged Posterior Bilaterally] : posterior cervical [Cervical Lymph Nodes Enlarged Anterior Bilaterally] : anterior cervical [No Spinal Tenderness] : no spinal tenderness [No CVA Tenderness] : no ~M costovertebral angle tenderness [Supraclavicular Lymph Nodes Enlarged Bilaterally] : supraclavicular [] : no rash [Impaired Insight] : insight and judgment were intact [Affect] : the affect was normal [FreeTextEntry1] : tr ankle edema

## 2019-08-01 ENCOUNTER — APPOINTMENT (OUTPATIENT)
Dept: NEPHROLOGY | Facility: CLINIC | Age: 49
End: 2019-08-01
Payer: COMMERCIAL

## 2019-08-01 VITALS — DIASTOLIC BLOOD PRESSURE: 86 MMHG | HEART RATE: 80 BPM | SYSTOLIC BLOOD PRESSURE: 166 MMHG

## 2019-08-01 PROCEDURE — 99214 OFFICE O/P EST MOD 30 MIN: CPT

## 2019-08-04 NOTE — HISTORY OF PRESENT ILLNESS
[FreeTextEntry1] : 47 yo  man with f/u evaluation of HTN, CKD 3, proteinuria, DMT2, here for f/u evaluation.\par recently returned from [place]- feels that he did not have any edema while there-\par now reports that edema is okay today, but goes get to be much worse.\par still with foamy urine\par no eye appt yet- reminded to do so by end of summer\par no flank pain, dysuria or hematuria\par No NSAID use\par \par \par [facility]H: \[facility]  [facility] and found to have STAN- with increase in creat to  2.4 from 1.79 baseline. Is aware that he has not been focused on BP and also A1C elevated-

## 2019-08-04 NOTE — REASON FOR VISIT
[Follow-Up] : a follow-up visit [Family Member] : family member [Other: _____] : [unfilled] [FreeTextEntry1] : for HTN, DM type 2, CKD 3

## 2019-08-04 NOTE — ASSESSMENT
[FreeTextEntry1] : all lab data was reviewed with patient in detail from 6/18/2019\par 47 yo man  with CKD 3, uncontrolled HTN, proteinuria, DMT2 and prior h/o STAN\par --HTN- BP not at goal- again reviewed need to optimize BP, reduce salt intake-\par add torsemide\par -STAN- vs rapid progression from uncontrolled DM-- creat 2.6- sone variability\par needs diuresis, BP control and to creatinine flat- \par -proteinuria-- nephrotic range- c/w losartan- raise dose as tolerated- prefer to defer until creat flat\par optimize BP and A1C, \par -DM- needs to optimize A1C control- instructed to see ophtho- ? presence or absence of diabetic retinopathy -\par still may serologies\par --CKD 3-  creat generally stable at 2.6\par probably new baseline after episode of STAN\par once A1C improved, limit protein intake;\par as above,  optimize BP, reduce proteinuria; \par re- discussed progressive nature of CKD\par --HLD - continue with atorvastatin \par \par f/u OV 3 months

## 2019-08-04 NOTE — PHYSICAL EXAM
[General Appearance - Alert] : alert [General Appearance - In No Acute Distress] : in no acute distress [Sclera] : the sclera and conjunctiva were normal [Extraocular Movements] : extraocular movements were intact [Outer Ear] : the ears and nose were normal in appearance [Examination Of The Oral Cavity] : the lips and gums were normal [Neck Appearance] : the appearance of the neck was normal [Neck Cervical Mass (___cm)] : no neck mass was observed [Auscultation Breath Sounds / Voice Sounds] : lungs were clear to auscultation bilaterally [Apical Impulse] : the apical impulse was normal [Heart Rate And Rhythm] : heart rate was normal and rhythm regular [Heart Sounds] : normal S1 and S2 [Heart Sounds Gallop] : no gallops [Murmurs] : no murmurs [Heart Sounds Pericardial Friction Rub] : no pericardial rub [Cervical Lymph Nodes Enlarged Posterior Bilaterally] : posterior cervical [Cervical Lymph Nodes Enlarged Anterior Bilaterally] : anterior cervical [Supraclavicular Lymph Nodes Enlarged Bilaterally] : supraclavicular [No CVA Tenderness] : no ~M costovertebral angle tenderness [No Spinal Tenderness] : no spinal tenderness [Abnormal Walk] : normal gait [] : no rash [No Focal Deficits] : no focal deficits [Oriented To Time, Place, And Person] : oriented to person, place, and time [Impaired Insight] : insight and judgment were intact [Affect] : the affect was normal [FreeTextEntry1] : tr ankle edema

## 2019-10-15 ENCOUNTER — APPOINTMENT (OUTPATIENT)
Dept: NEPHROLOGY | Facility: CLINIC | Age: 49
End: 2019-10-15
Payer: COMMERCIAL

## 2019-10-15 VITALS — DIASTOLIC BLOOD PRESSURE: 81 MMHG | SYSTOLIC BLOOD PRESSURE: 154 MMHG | HEART RATE: 77 BPM

## 2019-10-15 PROCEDURE — 99214 OFFICE O/P EST MOD 30 MIN: CPT

## 2019-10-15 NOTE — ASSESSMENT
[FreeTextEntry1] : no new lab data, will be going today\par 48 yo man with CKD 3, uncontrolled HTN, proteinuria, DMT2 and prior h/o STAN\par -HTN- BP uncontrolled- expressed concern about his lack of progress- needs to be engaged enough to know his medications and whether he is taking them or not- \par degree of non compliance probable;  renewed all HTN medications (6, including torsemide).  Increased hydralazine to 50mg TID.   BP check 4 weeks\par --CKD 3-  creat in 2.6 range, probably new baseline after episode of STAN\par focus on improving  A1C  and BP; then limit protein intake;\par -proteinuria-- nephrotic range- c/w losartan- raise dose as tolerated- prefer to defer until creat flat\par again, as above, optimize BP and A1C, \par repeat UA with PCR.  GN workup, though probable diabetic nephropathy\par -DM-  optimize A1C control- still needs to see ophtho- ? presence or absence of diabetic retinopathy -\par -HLD - c/w atorvastatin \par \par f/u OV 1 month

## 2019-10-15 NOTE — HISTORY OF PRESENT ILLNESS
[FreeTextEntry1] : 48 yo man here for f/u evaluation of HTN, CKD 3, proteinuria, DMT2\par Still not checking BP regularly at home. But compliant with medication. However, not sure if he is has been taking torsemide (started on last OV). \par continues with foamy urine\par legs swelling when standing often. \par Missed eye appointment, will be rescheduling. \par Denies HA, CP, SOB, dizziness. no flank pain, dysuria or hematuria\par No NSAID use\par \par PMH: \par In [facility] past spring  for accelerated HTN and found to have STAN- with increase in creat to 2.4 from 1.79 baseline. Is aware that he has not been focused on BP and also A1C elevated-

## 2019-10-15 NOTE — PHYSICAL EXAM
[General Appearance - Alert] : alert [General Appearance - In No Acute Distress] : in no acute distress [Sclera] : the sclera and conjunctiva were normal [Extraocular Movements] : extraocular movements were intact [Neck Appearance] : the appearance of the neck was normal [Outer Ear] : the ears and nose were normal in appearance [Examination Of The Oral Cavity] : the lips and gums were normal [Neck Cervical Mass (___cm)] : no neck mass was observed [Apical Impulse] : the apical impulse was normal [Auscultation Breath Sounds / Voice Sounds] : lungs were clear to auscultation bilaterally [Heart Sounds] : normal S1 and S2 [Heart Rate And Rhythm] : heart rate was normal and rhythm regular [Heart Sounds Gallop] : no gallops [Murmurs] : no murmurs [Heart Sounds Pericardial Friction Rub] : no pericardial rub [Cervical Lymph Nodes Enlarged Anterior Bilaterally] : anterior cervical [Cervical Lymph Nodes Enlarged Posterior Bilaterally] : posterior cervical [Supraclavicular Lymph Nodes Enlarged Bilaterally] : supraclavicular [No CVA Tenderness] : no ~M costovertebral angle tenderness [No Spinal Tenderness] : no spinal tenderness [Abnormal Walk] : normal gait [] : no rash [No Focal Deficits] : no focal deficits [Oriented To Time, Place, And Person] : oriented to person, place, and time [Impaired Insight] : insight and judgment were intact [Affect] : the affect was normal [FreeTextEntry1] : tr ankle edema

## 2019-10-17 ENCOUNTER — APPOINTMENT (OUTPATIENT)
Dept: NEPHROLOGY | Facility: CLINIC | Age: 49
End: 2019-10-17

## 2019-10-17 LAB
ALBUMIN MFR SERPL ELPH: 53.8 %
ALBUMIN SERPL ELPH-MCNC: 3.7 G/DL
ALBUMIN SERPL-MCNC: 3.5 G/DL
ALBUMIN/GLOB SERPL: 1.2 RATIO
ALPHA1 GLOB MFR SERPL ELPH: 5.1 %
ALPHA1 GLOB SERPL ELPH-MCNC: 0.3 G/DL
ALPHA2 GLOB MFR SERPL ELPH: 11.2 %
ALPHA2 GLOB SERPL ELPH-MCNC: 0.7 G/DL
ANION GAP SERPL CALC-SCNC: 15 MMOL/L
APPEARANCE: CLEAR
B-GLOBULIN MFR SERPL ELPH: 14.2 %
B-GLOBULIN SERPL ELPH-MCNC: 0.9 G/DL
BACTERIA: NEGATIVE
BILIRUBIN URINE: NEGATIVE
BLOOD URINE: NORMAL
BUN SERPL-MCNC: 32 MG/DL
CALCIUM SERPL-MCNC: 9.6 MG/DL
CHLORIDE SERPL-SCNC: 107 MMOL/L
CO2 SERPL-SCNC: 24 MMOL/L
COLOR: NORMAL
CREAT SERPL-MCNC: 2.72 MG/DL
CREAT SPEC-SCNC: 67 MG/DL
CREAT/PROT UR: 4.1 RATIO
DEPRECATED KAPPA LC FREE/LAMBDA SER: 1.64 RATIO
GAMMA GLOB FLD ELPH-MCNC: 1 G/DL
GAMMA GLOB MFR SERPL ELPH: 15.7 %
GLUCOSE QUALITATIVE U: ABNORMAL
GLUCOSE SERPL-MCNC: 182 MG/DL
HBV CORE IGG+IGM SER QL: NONREACTIVE
HBV SURFACE AB SER QL: REACTIVE
HBV SURFACE AG SER QL: NONREACTIVE
HCV AB SER QL: NONREACTIVE
HCV S/CO RATIO: 0.12 S/CO
HYALINE CASTS: 3 /LPF
IGA SER QL IEP: 296 MG/DL
IGG SER QL IEP: 1117 MG/DL
IGM SER QL IEP: 37 MG/DL
INTERPRETATION SERPL IEP-IMP: NORMAL
KAPPA LC CSF-MCNC: 3.45 MG/DL
KAPPA LC SERPL-MCNC: 5.67 MG/DL
KETONES URINE: NEGATIVE
LEUKOCYTE ESTERASE URINE: NEGATIVE
M PROTEIN SPEC IFE-MCNC: NORMAL
MICROSCOPIC-UA: NORMAL
NITRITE URINE: NEGATIVE
PH URINE: 6
PHOSPHATE SERPL-MCNC: 4.3 MG/DL
POTASSIUM SERPL-SCNC: 4.1 MMOL/L
PROT SERPL-MCNC: 6.5 G/DL
PROT SERPL-MCNC: 6.5 G/DL
PROT UR-MCNC: 276 MG/DL
PROTEIN URINE: ABNORMAL
RED BLOOD CELLS URINE: 3 /HPF
SODIUM SERPL-SCNC: 146 MMOL/L
SPECIFIC GRAVITY URINE: 1.01
SQUAMOUS EPITHELIAL CELLS: 1 /HPF
UROBILINOGEN URINE: NORMAL
WHITE BLOOD CELLS URINE: 1 /HPF

## 2019-11-19 ENCOUNTER — APPOINTMENT (OUTPATIENT)
Dept: NEPHROLOGY | Facility: CLINIC | Age: 49
End: 2019-11-19
Payer: COMMERCIAL

## 2019-11-19 VITALS — DIASTOLIC BLOOD PRESSURE: 74 MMHG | SYSTOLIC BLOOD PRESSURE: 154 MMHG | HEART RATE: 90 BPM

## 2019-11-19 PROCEDURE — 99214 OFFICE O/P EST MOD 30 MIN: CPT

## 2019-11-19 NOTE — HISTORY OF PRESENT ILLNESS
[FreeTextEntry1] : 50 yo man for f/u evaluation of HTN, CKD 3, proteinuria, DMT2\par Not checking BP regularly, but still in 150-160s/70s the few times he has checked, appt yesterday wt Dr. Foster 170 systolic.  Was given nasal spray for snoring and will be seeing a sleep doctor next month.  Previously did not tolerate CPAP mask ~15 years ago.  \par compliant with BP medications but ran out of torsemide for the past few days.\par insurance no longer covering his glyx[place], he will be asking his endo for an alternative\par Missed eye appointment, has not rescheduled yet. \par legs swelling when standing/sitting often.  \par gym no longer covered, walks around at job\par No NSAID use\par Denies HA, CP, SOB, dizziness. Still frothy urine but no flank pain, dysuria or hematuria\par \par PMH: \par In [facility] past spring  for accelerated HTN and found to have STAN- with increase in creat to 2.4 from 1.79 baseline. Is aware that he has not been focused on BP and also A1C elevated-

## 2019-11-19 NOTE — ASSESSMENT
[FreeTextEntry1] : reviewed lab results in detail with patient from 10/15/19 \par 50 yo man with CKD 3, uncontrolled HTN, proteinuria, DMT2 and prior h/o STAN\par -HTN- BP remains uncontrolled- re-expressed concern about his lack of progress- needs to be engaged enough to know his medications and to inform us if running out.  medications reconciled, torsemide renewed.  Instructed to be more vigilant in monitoring BP at home.  \par -CKD 3-  creat stable 2.72, 2.6 prior lab - (episode of STAN 5/2019 kev from 1.7-1.9 range)\par focus on improving  A1C  and BP; limit protein intake;\par -proteinuria-- nephrotic range, 4.1g- raising dose of losartan to 50mg daily- to send for repeat chem next OV.  As above, optimize BP and A1C,  GN workup negative\par -DM-  optimize A1C control- still needs to see ophtho- ? presence or absence of diabetic retinopathy -\par -HLD - c/w atorvastatin \par \par f/u OV 1 month

## 2019-11-19 NOTE — PHYSICAL EXAM
[General Appearance - Alert] : alert [Sclera] : the sclera and conjunctiva were normal [General Appearance - In No Acute Distress] : in no acute distress [Outer Ear] : the ears and nose were normal in appearance [Extraocular Movements] : extraocular movements were intact [Neck Appearance] : the appearance of the neck was normal [Examination Of The Oral Cavity] : the lips and gums were normal [Neck Cervical Mass (___cm)] : no neck mass was observed [Heart Rate And Rhythm] : heart rate was normal and rhythm regular [Auscultation Breath Sounds / Voice Sounds] : lungs were clear to auscultation bilaterally [Apical Impulse] : the apical impulse was normal [Murmurs] : no murmurs [Heart Sounds Gallop] : no gallops [Heart Sounds] : normal S1 and S2 [Heart Sounds Pericardial Friction Rub] : no pericardial rub [Cervical Lymph Nodes Enlarged Posterior Bilaterally] : posterior cervical [Cervical Lymph Nodes Enlarged Anterior Bilaterally] : anterior cervical [No CVA Tenderness] : no ~M costovertebral angle tenderness [Supraclavicular Lymph Nodes Enlarged Bilaterally] : supraclavicular [] : no rash [Abnormal Walk] : normal gait [No Spinal Tenderness] : no spinal tenderness [No Focal Deficits] : no focal deficits [Oriented To Time, Place, And Person] : oriented to person, place, and time [Impaired Insight] : insight and judgment were intact [Affect] : the affect was normal [FreeTextEntry1] : tr ankle edema

## 2019-11-19 NOTE — REASON FOR VISIT
[Family Member] : family member [Follow-Up] : a follow-up visit [Other: _____] : [unfilled] [FreeTextEntry1] : for HTN, DM type 2, CKD 3

## 2019-12-03 NOTE — PATIENT PROFILE ADULT - NSTRANSFERBELONGINGSDISPO_GEN_A_NUR
Hematology / Oncology Outpatient Follow Up Note    Jhonatan Bella 63 y o  female :1960 CYC:[id number]         Date:  12/3/2019    Assessment / Plan:  A 50-year-old postmenopausal woman with stage IIIA left breast cancer, grade 2, invasive lobular histology, ER 75% positive, OH 15% positive, HER2 negative disease   She underwent mastectomy and axillary lymph node dissection, resulting in IBAN   She had 6 positive lymph nodes as well as 8 1 cm of primary tumor size   She completed adjuvant chemotherapy with dose dense AC followed by paclitaxel in 2018 with significant toxicity, including chemotherapy-induced anemia, neuropathy, heart appeared to be hemorrhagic cystitis as well as left lower extremity DVT  She discontinued anastrozole after 3 months of use, due to his night sweats  Clinically, she has no evidence recurrent disease  We discussed importance of adjuvant hormonal therapy  I recommended her to start letrozole 2 5 mg daily  She is in agreement with my recommendation  I will see her again in 6 months for routine follow-up                                                                                                                        Subjective:      HPI:  A 50-year-old postmenopausal woman who palpated lump in the left breast which she brought to medical attention  Domelma Moranles was found to have radiographic abnormality for which she underwent left breast biopsy in April 3, 2018   Biopsy showed invasive lobular carcinoma, grade 2   This was ER % positive, OH 15% positive, HER2 negative disease   She had led to be large breast tumor as well as clinically positive lymph nodes  Therefore, she underwent mastectomy and axillary lymph node dissection by Dr Jim Brock in May 15, 2018   She had 8 1 cm of invasive ductal carcinoma, grade 2   Lymphovascular invasion was present   24 axillary lymph nodes were positive for metastatic disease with largest tumor measuring 1 9 cm with extranodal extension   She did not have reconstruction  Abiel Bard to the surgery, PET-CT scan showed no evidence of distant metastasis   She presents today to discuss adjuvant treatment options   She has no breast related symptomatology   Her weight has been stable   She has no complaint of bone pain   She denied any respiratory symptoms   Her performance status is normal  She is a lifetime never smoker         Interval History:   A 46-year-old postmenopausal woman with stage IIIA left breast cancer, grade 2, invasive lobular histology, ER 75% positive, W[place] 15% positive, HER2 negative disease   She underwent mastectomy and axillary lymph node dissection, resulting in IBAN   She had 6 positive lymph nodes as well as 8 1 cm of primary tumor size   She was treated with adjuvant chemotherapy with dose dense AC followed by weekly paclitaxel with significant toxicities  After the adjuvant chemotherapy , she had postmastectomy radiation therapy  She was on anastrozole for only 3 months which she discontinued on her own in summer of 2019, due to the night sweats  She presents today for routine follow-up  She feels well  She still have residual neuropathy which has been gradually improving  She has no complaint of pain  She has no respiratory symptoms  She is on baby aspirin, which she started after she discontinue apixaban for provoked DVT  Her performance status is normal                                                                                                                                                                                                  Objective:      Primary Diagnosis:     1  Left breast cancer, stage IIIA (pT3, pN2, M0) grade 2, invasive lobular histology, ER 75% positive, WY 15% positive, HER2 negative disease   6 positive lymph nodes  Diagnosed in May 2018    2    Left lower extremity DVT diagnosed in October 2018      Cancer Staging: Leona Marcial Staging  Malignant neoplasm of overlapping sites of left breast in female, estrogen receptor positive ([address] )  Staging form: Breast, AJCC 8th Edition  - Clinical stage from 4/10/2018: Stage IIA (cT2, cN1(f), cM0, G2, ER: Positive, WV: Positive, HER2: Negative) - Signed by Rebecca Smith MD on 4/10/2018           Previous Hematologic/ Oncologic Treatment:       1  Adjuvant chemotherapy with dose dense AC x4 followed by weekly paclitaxel x12   Completed in December 2018  2  Adjuvant hormonal therapy with anastrozole from April 2019 through July 2019       Current Hematologic/ Oncologic Treatment:       Adjuvant hormonal therapy with letrozole to be started in December 2019         Disease Status:      IBAN status post mastectomy and axillary lymph node dissection      Test Results:     Pathology:     8 1 cm of invasive lobular carcinoma, grade 2   Lymphovascular invasion was present  6/24 axillary lymph nodes were positive for metastatic disease with largest tumor 1 9 cm   Extranodal extension was positive   ER 75 to 100% positive, WV 15 % positive, HER2 negative disease   Stage IIIA (pT3, pN2, M0)     Radiology:     Doppler study in May 2018 showed resolution of DVT      Mammography in April 2019 was benign  BI-RADS 2      Laboratory:     See below      Physical Exam:        General Appearance:    Alert, oriented          Eyes:    PERRL   Ears:    Normal external ear canals, both ears   Nose:   Nares normal, septum midline   Throat:   Mucosa moist  Pharynx without injection  Neck:   Supple         Lungs:     Clear to auscultation bilaterally   Chest Wall:    No tenderness or deformity    Heart:    Regular rate and rhythm         Abdomen:     Soft, non-tender, bowel sounds +, no organomegaly               Extremities:   Extremities no cyanosis, left lower extremity swelling          Skin:   no rash or icterus      Lymph nodes:   Cervical, supraclavicular, and axillary nodes normal   Neurologic:   CNII-XII intact, normal strength, sensation and reflexes     Throughout             Breast exam: Status post left mastectomy without reconstruction   No palpable abnormality in her left chest wall   Minor redness around the mastectomy scar   Right breast exam is negative  ROS: Review of Systems   All other systems reviewed and are negative  Imaging: Us Breast Left Limited (diagnostic)    Result Date: 11/11/2019  Narrative: DIAGNOSIS: Axillary lump, left TECHNIQUE: Ultrasound of the left breast(s) was performed  COMPARISONS: Prior breast imaging dated: 04/08/2019, 04/03/2018, 04/03/2018, 04/03/2018, 04/03/2018, 04/03/2018, 03/26/2018, 03/26/2018, and 02/22/2018 RELEVANT HISTORY: Family Breast Cancer History: History of breast cancer in Maternal Aunt  Family Medical History: Family medical history includes breast cancer in maternal aunt  Personal History: No known relevant hormone history  Surgical history includes breast biopsy and mastectomy  Medical history includes breast cancer and history of chemotherapy  RISK ASSESSMENT: 5 Year Tyrer-Cuzick: No Score 10 Year Tyrer-Cuzick: No Score Lifetime Tyrer-Cuzick: No Score INDICATION: Josefa Rajan is a 63 y o  female presenting for left axillary lump, history left breast cancer  FINDINGS: Targeted sonographic evaluation of the left axilla at the area of palpable concern was performed  There is scarring present in this patient with prior axillary surgery  There are no suspicious masses or areas of architectural distortion  There is a 3 1 x 0 4 x 1 6 cm anechoic collection without internal vascularity on color images  Findings are suggestive of postoperative seroma  Recommend close clinical follow-up to ensure stability/resolution of these findings       Impression:  ASSESSMENT/BI-RADS CATEGORY: Left: 2 - Benign Overall: 2 - Benign RECOMMENDATION:      - Clinical management for the left breast  Workstation ID: [id number]        Labs:   Lab Results   Component Value Date    WBC 6 52 06/25/2019    HGB 9 9 (L) 06/25/2019    HCT 30 4 (L) 06/25/2019     (H) 06/25/2019     06/25/2019     Lab Results   Component Value Date    K 4 4 06/25/2019     06/25/2019    CO2 27 06/25/2019    BUN 5 06/25/2019    CREATININE 0 81 06/25/2019    GLUF 127 (H) 04/23/2019    CALCIUM 8 6 06/25/2019    AST 44 06/25/2019    ALT 20 06/25/2019    ALKPHOS 83 06/25/2019    EGFR 81 06/25/2019         Lab Results   Component Value Date     12/31/2018         Lab Results   Component Value Date    IRON 12 (L) 12/19/2018    TIBC 145 (L) 12/19/2018    FERRITIN 1,033 (H) 12/19/2018       Lab Results   Component Value Date    CYDSSKHQ98 1,811 (H) 12/19/2018       Lab Results   Component Value Date    FOLATE 6 7 12/19/2018         Current Medications: Reviewed  Allergies: Reviewed  PMH/FH/SH:  Reviewed      Vital Sign:    Body surface area is 1 77 meters squared      Wt Readings from Last 3 Encounters:   12/03/19 68 kg (150 lb)   11/05/19 66 2 kg (146 lb)   07/29/19 54 9 kg (121 lb)        Temp Readings from Last 3 Encounters:   12/03/19 (!) 97 3 °F (36 3 °C) (Tympanic)   11/05/19 97 8 °F (36 6 °C) (Oral)   06/25/19 98 8 °F (37 1 °C) (Oral)        BP Readings from Last 3 Encounters:   12/03/19 136/82   11/05/19 120/80   06/25/19 103/59         Pulse Readings from Last 3 Encounters:   12/03/19 76   11/05/19 86   06/25/19 80     @LASTSAO2(3)@ with patient

## 2019-12-09 ENCOUNTER — APPOINTMENT (OUTPATIENT)
Dept: SLEEP CENTER | Facility: HOSPITAL | Age: 49
End: 2019-12-09

## 2019-12-09 ENCOUNTER — OUTPATIENT (OUTPATIENT)
Dept: OUTPATIENT SERVICES | Facility: HOSPITAL | Age: 49
LOS: 1 days | End: 2019-12-09
Payer: COMMERCIAL

## 2019-12-09 DIAGNOSIS — G47.33 OBSTRUCTIVE SLEEP APNEA (ADULT) (PEDIATRIC): ICD-10-CM

## 2019-12-09 DIAGNOSIS — Z90.49 ACQUIRED ABSENCE OF OTHER SPECIFIED PARTS OF DIGESTIVE TRACT: Chronic | ICD-10-CM

## 2019-12-09 PROCEDURE — 95810 POLYSOM 6/> YRS 4/> PARAM: CPT | Mod: 26

## 2019-12-09 PROCEDURE — 95810 POLYSOM 6/> YRS 4/> PARAM: CPT

## 2019-12-19 ENCOUNTER — APPOINTMENT (OUTPATIENT)
Dept: NEPHROLOGY | Facility: CLINIC | Age: 49
End: 2019-12-19
Payer: COMMERCIAL

## 2019-12-19 VITALS — HEART RATE: 86 BPM | SYSTOLIC BLOOD PRESSURE: 164 MMHG | DIASTOLIC BLOOD PRESSURE: 80 MMHG

## 2019-12-19 PROCEDURE — 99214 OFFICE O/P EST MOD 30 MIN: CPT

## 2019-12-19 NOTE — HISTORY OF PRESENT ILLNESS
[FreeTextEntry1] : 50 yo man for f/u evaluation of HTN, CKD 3, proteinuria, DMT2\par Misplaced BP log but remembers high of 161/90s, low of 147/97.  Compliant with medications but does not recall exact names and dosages.  uses wrist cuff at home.  \par Did sleep study a week ago.  \par Has eye appointment today. \par Was able to get glyx[place]ia.  \par legs swelling when standing/sitting often.  \par gym no longer covered, walks around at jobs\par No NSAID use\par Denies HA, CP, SOB, dizziness. Still frothy urine but no flank pain, dysuria or hematuria\par \par PCP: Dr. Corwin Foster\par endo: Dr. Oh Burris\par \par PMH: \par In [facility] May 2019 for accelerated HTN and found to have STAN- with increase in creat to 2.4 from 1.79 baseline. Is aware that he had not been focused on BP and also A1C elevated-

## 2019-12-19 NOTE — REASON FOR VISIT
[Follow-Up] : a follow-up visit [Other: _____] : [unfilled] [Family Member] : family member [FreeTextEntry1] : for HTN, DM type 2, CKD 3

## 2019-12-19 NOTE — PHYSICAL EXAM
[General Appearance - Alert] : alert [General Appearance - In No Acute Distress] : in no acute distress [Outer Ear] : the ears and nose were normal in appearance [Sclera] : the sclera and conjunctiva were normal [Extraocular Movements] : extraocular movements were intact [Neck Appearance] : the appearance of the neck was normal [Examination Of The Oral Cavity] : the lips and gums were normal [Neck Cervical Mass (___cm)] : no neck mass was observed [Auscultation Breath Sounds / Voice Sounds] : lungs were clear to auscultation bilaterally [Apical Impulse] : the apical impulse was normal [Heart Sounds Gallop] : no gallops [Heart Sounds] : normal S1 and S2 [Heart Rate And Rhythm] : heart rate was normal and rhythm regular [Heart Sounds Pericardial Friction Rub] : no pericardial rub [Murmurs] : no murmurs [Cervical Lymph Nodes Enlarged Posterior Bilaterally] : posterior cervical [Cervical Lymph Nodes Enlarged Anterior Bilaterally] : anterior cervical [No CVA Tenderness] : no ~M costovertebral angle tenderness [Supraclavicular Lymph Nodes Enlarged Bilaterally] : supraclavicular [No Spinal Tenderness] : no spinal tenderness [] : no rash [Abnormal Walk] : normal gait [No Focal Deficits] : no focal deficits [Oriented To Time, Place, And Person] : oriented to person, place, and time [Impaired Insight] : insight and judgment were intact [Affect] : the affect was normal [FreeTextEntry1] : tr b/l LE edema

## 2019-12-19 NOTE — ASSESSMENT
[FreeTextEntry1] : no new lab data since 10/15/19 \par 50 yo man with CKD 3, uncontrolled HTN, proteinuria, DMT2 and prior h/o STAN\par -HTN- BP remains uncontrolled, - Emphasized need to be engaged enough to know his medications and to inform us if running out.  Increasing carvedilol from 12.5mg BID to 25mg BID.\par BP medication schedule given.  AM: torsemide 10mg, amlodipine 5mg, carvedilol 25mg, hydralazine 50mg; afternoon: hydralazine 50mg; PM: losartan 50mg, isosorbide 30mg, carvedilol 25mg, hydralazine 50mg.   \par Continue with vigilant BP monitoring at home.  Instructed to bring in wrist monitor from home to correlate with office device.   \par If BP continues to be uncontrolled, to send for labs and US to r/o secondary causes.  sleep study results pending.\par -CKD 3/4-  creat stable 2.72, 2.6 prior lab, for repeat chem today.  - (episode of STAN 5/2019 kev from 1.7-1.9 range).  focus on improving  A1C and BP; limit protein intake; avoiding NSAIDs\par -proteinuria-- nephrotic range, 4.1g- continue losartan 50mg daily- for repeat PCR.  As above, optimize BP and A1C, GN workup negative\par -DM-  optimize A1C control- annual eye exam today - ? presence or absence of diabetic retinopathy -\par -HLD - c/w atorvastatin \par \par f/u OV 1 month

## 2019-12-30 LAB
ANION GAP SERPL CALC-SCNC: 13 MMOL/L
APPEARANCE: CLEAR
BACTERIA: NEGATIVE
BILIRUBIN URINE: NEGATIVE
BLOOD URINE: NEGATIVE
BUN SERPL-MCNC: 43 MG/DL
CALCIUM SERPL-MCNC: 9 MG/DL
CHLORIDE SERPL-SCNC: 101 MMOL/L
CO2 SERPL-SCNC: 27 MMOL/L
COLOR: COLORLESS
CREAT SERPL-MCNC: 3.11 MG/DL
CREAT SPEC-SCNC: 36 MG/DL
CREAT/PROT UR: 3.5 RATIO
GLUCOSE QUALITATIVE U: ABNORMAL
GLUCOSE SERPL-MCNC: 402 MG/DL
HYALINE CASTS: 1 /LPF
KETONES URINE: NEGATIVE
LEUKOCYTE ESTERASE URINE: NEGATIVE
MICROSCOPIC-UA: NORMAL
NITRITE URINE: NEGATIVE
PH URINE: 6
POTASSIUM SERPL-SCNC: 3.9 MMOL/L
PROT UR-MCNC: 127 MG/DL
PROTEIN URINE: ABNORMAL
RED BLOOD CELLS URINE: 2 /HPF
SODIUM SERPL-SCNC: 141 MMOL/L
SPECIFIC GRAVITY URINE: 1.01
SQUAMOUS EPITHELIAL CELLS: 1 /HPF
UROBILINOGEN URINE: NORMAL
WHITE BLOOD CELLS URINE: 1 /HPF

## 2020-01-09 ENCOUNTER — OUTPATIENT (OUTPATIENT)
Dept: OUTPATIENT SERVICES | Facility: HOSPITAL | Age: 50
LOS: 1 days | End: 2020-01-09
Payer: COMMERCIAL

## 2020-01-09 ENCOUNTER — APPOINTMENT (OUTPATIENT)
Dept: SLEEP CENTER | Facility: HOSPITAL | Age: 50
End: 2020-01-09

## 2020-01-09 DIAGNOSIS — G47.33 OBSTRUCTIVE SLEEP APNEA (ADULT) (PEDIATRIC): ICD-10-CM

## 2020-01-09 DIAGNOSIS — Z90.49 ACQUIRED ABSENCE OF OTHER SPECIFIED PARTS OF DIGESTIVE TRACT: Chronic | ICD-10-CM

## 2020-01-09 PROCEDURE — 95811 POLYSOM 6/>YRS CPAP 4/> PARM: CPT | Mod: 26

## 2020-01-09 PROCEDURE — 95811 POLYSOM 6/>YRS CPAP 4/> PARM: CPT

## 2020-01-16 ENCOUNTER — APPOINTMENT (OUTPATIENT)
Dept: NEPHROLOGY | Facility: CLINIC | Age: 50
End: 2020-01-16
Payer: COMMERCIAL

## 2020-01-16 VITALS — SYSTOLIC BLOOD PRESSURE: 161 MMHG | DIASTOLIC BLOOD PRESSURE: 80 MMHG | HEART RATE: 80 BPM

## 2020-01-16 VITALS — DIASTOLIC BLOOD PRESSURE: 84 MMHG | HEART RATE: 80 BPM | SYSTOLIC BLOOD PRESSURE: 160 MMHG

## 2020-01-16 DIAGNOSIS — N18.3 CHRONIC KIDNEY DISEASE, STAGE 3 (MODERATE): ICD-10-CM

## 2020-01-16 PROCEDURE — 99214 OFFICE O/P EST MOD 30 MIN: CPT

## 2020-01-16 RX ORDER — BLOOD PRESSURE TEST KIT-LARGE
KIT MISCELLANEOUS
Qty: 1 | Refills: 0 | Status: ACTIVE | COMMUNITY
Start: 2020-01-16 | End: 1900-01-01

## 2020-01-16 NOTE — HISTORY OF PRESENT ILLNESS
[FreeTextEntry1] : 48 yo M following up for CKD 3, proteinuria, HTN and DMT2\par He has been concerned about his declining kidney function since last month's visit.  He had spoken to his aunt who has hx of kidney transplant.  \par Had been checking BP occasionally at home.  Readings from 124/80 to 151/97, mostly in higher range.  Compliant with medication regimen we reviewd last visit.  More engaged. \par Brought home wrist cuff, 160/96 after few readings in office today.\par Reports that sugars have improved but still in high 100s.\par Optometry appointment revealed mild proliferative changes due to DM.   \par Completed sleep study titration study, should be set up for CPAP or BIPAP soon. \par legs still swelling when standing/sitting often.  \par gym no longer covered, walks around at jobs.  Has been more serious about healthy diet.  \par No NSAID use\par Denies HA, CP, SOB, dizziness. Frothy urine but no flank pain, dysuria or hematuria\par \par PCP: Dr. Corwin Foster\par endo: Dr. Oh Burris\par \par PMH: \par In [facility] May 2019 for accelerated HTN and found to have STAN- with increase in creat to 2.4 from 1.79 baseline. Is aware that he had not been focused on BP and also A1C elevated-

## 2020-01-16 NOTE — PHYSICAL EXAM
[General Appearance - Alert] : alert [General Appearance - In No Acute Distress] : in no acute distress [Sclera] : the sclera and conjunctiva were normal [Extraocular Movements] : extraocular movements were intact [Outer Ear] : the ears and nose were normal in appearance [Examination Of The Oral Cavity] : the lips and gums were normal [Neck Appearance] : the appearance of the neck was normal [Neck Cervical Mass (___cm)] : no neck mass was observed [Auscultation Breath Sounds / Voice Sounds] : lungs were clear to auscultation bilaterally [Heart Rate And Rhythm] : heart rate was normal and rhythm regular [Heart Sounds] : normal S1 and S2 [Apical Impulse] : the apical impulse was normal [Murmurs] : no murmurs [Heart Sounds Gallop] : no gallops [Heart Sounds Pericardial Friction Rub] : no pericardial rub [Supraclavicular Lymph Nodes Enlarged Bilaterally] : supraclavicular [Cervical Lymph Nodes Enlarged Posterior Bilaterally] : posterior cervical [Cervical Lymph Nodes Enlarged Anterior Bilaterally] : anterior cervical [Abnormal Walk] : normal gait [No Spinal Tenderness] : no spinal tenderness [No CVA Tenderness] : no ~M costovertebral angle tenderness [No Focal Deficits] : no focal deficits [] : no rash [Impaired Insight] : insight and judgment were intact [Affect] : the affect was normal [Oriented To Time, Place, And Person] : oriented to person, place, and time [FreeTextEntry1] : 1-2+ b/l LE edema

## 2020-01-16 NOTE — ASSESSMENT
[FreeTextEntry1] : reviewed lab results in detail with patient from 12/19/19 \par \par 48 yo man with CKD 3, uncontrolled HTN, proteinuria, DMT2 and prior h/o STAN\par \par -HTN- BP remains uncontrolled, but engaging more with his medical management.  Complying with BP medicaiton regimen.  Instructed to increase torsemide from 10 to 20mg in the AM.\par BP medication schedule - AM: torsemide 20mg, amlodipine 5mg, carvedilol 25mg, hydralazine 50mg; afternoon: hydralazine 50mg; PM: losartan 50mg, isosorbide 30mg, carvedilol 25mg, hydralazine 50mg.   \par Wrist monitor only correlated well with systolic readings compared to office device, diastolic readings about 15mmHg too high.  Recommended arm cuff.  Sent rx for new device to pharmacy, hopefully covered by insurance.  Continue with vigilant BP monitoring at home. \par If BP continues to be uncontrolled after starting CPAP, to send for labs and US to r/o other secondary causes. \par  \par -CKD 4-  creat increased to 3.11 (eGFR 26).  prior lab 2.6-2.72.  (episode of STAN 5/2019 kev from baseline 1.7-1.9 range).  Sending for repeat chem to monitor.  Continue focus on improving  A1C and BP; limit protein intake; avoiding NSAIDs\par Dr. Duff had discussion with him in regards to likelihood of needing dialysis and/or kidney transplant in the future.  States he has a few family members who are willing to donate a kidney.  He will be discussing process further with his aunt.  Will defer kidney biopsy for now, likely due to DM.  Discussed with Dr. Henry.  \par \par -proteinuria-- nephrotic range, 3.5g, stable- continue losartan 50mg daily, defer increasing due to rising Cr.  As above, optimize BP and A1C, GN workup negative\par -DM-  optimize A1C control- f/u with Dr. Burris\par -ALMAS - to start BIPAP or CPAP soon.  Should improve BP.  Continue weight loss efforts.\par -HLD - c/w atorvastatin \par \par f/u OV 1 month

## 2020-01-17 LAB
ALBUMIN SERPL ELPH-MCNC: 3.9 G/DL
ANION GAP SERPL CALC-SCNC: 14 MMOL/L
APTT BLD: 32.7 SEC
BASOPHILS # BLD AUTO: 0.05 K/UL
BASOPHILS NFR BLD AUTO: 0.8 %
BUN SERPL-MCNC: 35 MG/DL
CALCIUM SERPL-MCNC: 9.4 MG/DL
CHLORIDE SERPL-SCNC: 103 MMOL/L
CO2 SERPL-SCNC: 25 MMOL/L
CREAT SERPL-MCNC: 2.92 MG/DL
EOSINOPHIL # BLD AUTO: 0.12 K/UL
EOSINOPHIL NFR BLD AUTO: 1.8 %
ESTIMATED AVERAGE GLUCOSE: 237 MG/DL
GLUCOSE SERPL-MCNC: 175 MG/DL
HBA1C MFR BLD HPLC: 9.9 %
HCT VFR BLD CALC: 38.1 %
HGB BLD-MCNC: 12.4 G/DL
IMM GRANULOCYTES NFR BLD AUTO: 0.6 %
INR PPP: 0.97 RATIO
LYMPHOCYTES # BLD AUTO: 1.89 K/UL
LYMPHOCYTES NFR BLD AUTO: 29 %
MAN DIFF?: NORMAL
MCHC RBC-ENTMCNC: 30.4 PG
MCHC RBC-ENTMCNC: 32.5 GM/DL
MCV RBC AUTO: 93.4 FL
MONOCYTES # BLD AUTO: 0.64 K/UL
MONOCYTES NFR BLD AUTO: 9.8 %
NEUTROPHILS # BLD AUTO: 3.78 K/UL
NEUTROPHILS NFR BLD AUTO: 58 %
PHOSPHATE SERPL-MCNC: 4.3 MG/DL
PLATELET # BLD AUTO: 275 K/UL
POTASSIUM SERPL-SCNC: 4 MMOL/L
PT BLD: 11.1 SEC
RBC # BLD: 4.08 M/UL
RBC # FLD: 12.9 %
SODIUM SERPL-SCNC: 142 MMOL/L
WBC # FLD AUTO: 6.52 K/UL

## 2020-02-18 ENCOUNTER — APPOINTMENT (OUTPATIENT)
Dept: NEPHROLOGY | Facility: CLINIC | Age: 50
End: 2020-02-18
Payer: COMMERCIAL

## 2020-02-18 VITALS — DIASTOLIC BLOOD PRESSURE: 79 MMHG | SYSTOLIC BLOOD PRESSURE: 146 MMHG | HEART RATE: 77 BPM

## 2020-02-18 DIAGNOSIS — I10 ESSENTIAL (PRIMARY) HYPERTENSION: ICD-10-CM

## 2020-02-18 PROCEDURE — 99214 OFFICE O/P EST MOD 30 MIN: CPT

## 2020-02-18 NOTE — ASSESSMENT
[FreeTextEntry1] : reviewed lab results in detail with patient from 1/16/20 and 2/4/20\par \par 48 yo man with CKD 4, uncontrolled HTN, proteinuria, DMT2 and prior h/o STAN\par \par -HTN- BP remains uncontrolled.  Complying with BP medicaiton regimen.  Instructed to increase hydralazine to 75mg TID (1.5 tabs TID).  If BP does not improve after two weeks, instructed him to take two full 50mg tabs 3x daily (100mg TID). Pt verbalized understanding.\par BP medication schedule - AM: torsemide 20mg, amlodipine 5mg, carvedilol 25mg, hydralazine 75mg; afternoon: hydralazine 75mg; PM: losartan 50mg, isosorbide 30mg, carvedilol 25mg, hydralazine 75mg.   \par Wrist monitor previously correlated well with systolic readings compared to office device, diastolic readings about 15mmHg too high.  Recommended arm cuff, but not covered by insurance? Pt to f/u with pharmacy. Continue with vigilant BP monitoring at home. \par If BP continues to be uncontrolled after starting CPAP, to send for labs and US to r/o other secondary HTN causes. \par Discussed trying to reduce his work schedule, pt will discuss with his family first before we complete FMLA form.\par  \par -CKD 4-  creat 3.09 (eGFR 26), stablized.  prior range 2.6-2.72.  (episode of STAN 5/2019 kev from baseline 1.7-1.9 range).  Continue focus on improving  A1C and BP; limit protein intake; avoiding NSAIDs\par Reiterated to likelihood of needing dialysis and/or kidney transplant in the future.  He has a few family members who are willing to donate a kidney.  He will be discussing process further with his aunt.  Will defer kidney biopsy for now, likely due to DM. \par \par -proteinuria-- nephrotic range, 3.5g, stable- continue losartan 50mg daily, defer increasing due to rising Cr.  As above, optimize BP and A1C.  GN workup negative\par -DM-  uncontrolled, needs to optimize A1C control- following up with Dr. Burris soon\par -ALMAS - to start BIPAP or CPAP soon, f/u with sleep center.  Should improve BP.  Continue weight loss efforts.\par -HLD - not at goal, pt to discuss with PCP about increasing atorvastatin \par -hyperuremia - uric acid 9.9.  no hx gout attacks, monitor. \par \par f/u OV 1 month

## 2020-02-18 NOTE — HISTORY OF PRESENT ILLNESS
[FreeTextEntry1] : 50 yo M following up for CKD 4, proteinuria, HTN and DMT2\par Reports home BP still around 140-150s systolic.  Has been more engaged and compliant with all BP medications and changes.   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  Not sleeping enough.  Has still not received CPAP or BIPAP following titration study.\par He has been concerned about his declining kidney function, had previously spoken to aunt who had transplant. Blood sugar remains elevated but following frequently with Dr. Burris.  \par Optometry revealed mild proliferative changes due to DM, f/u 1 year.\par legs still swelling when standing/sitting often but improved\par walks around at jobs.  Has been more serious about healthy diet.  \par No NSAID use\par Denies HA, CP, SOB, dizziness. Frothy urine but no flank pain, dysuria or hematuria\par \par PCP: Dr. Corwin Foster\par endo: Dr. Oh Burris\par \par PMH: \par In [facility] May 2019 for accelerated HTN and found to have STAN- with increase in creat to 2.4 from 1.79 baseline. Is aware that he had not been focused on BP and also A1C elevated-

## 2020-02-18 NOTE — PHYSICAL EXAM
[General Appearance - In No Acute Distress] : in no acute distress [General Appearance - Alert] : alert [Sclera] : the sclera and conjunctiva were normal [Extraocular Movements] : extraocular movements were intact [Examination Of The Oral Cavity] : the lips and gums were normal [Outer Ear] : the ears and nose were normal in appearance [Neck Appearance] : the appearance of the neck was normal [Neck Cervical Mass (___cm)] : no neck mass was observed [Auscultation Breath Sounds / Voice Sounds] : lungs were clear to auscultation bilaterally [Heart Rate And Rhythm] : heart rate was normal and rhythm regular [Heart Sounds] : normal S1 and S2 [Heart Sounds Gallop] : no gallops [Murmurs] : no murmurs [Heart Sounds Pericardial Friction Rub] : no pericardial rub [Cervical Lymph Nodes Enlarged Anterior Bilaterally] : anterior cervical [Cervical Lymph Nodes Enlarged Posterior Bilaterally] : posterior cervical [No CVA Tenderness] : no ~M costovertebral angle tenderness [Supraclavicular Lymph Nodes Enlarged Bilaterally] : supraclavicular [No Spinal Tenderness] : no spinal tenderness [Abnormal Walk] : normal gait [] : no rash [No Focal Deficits] : no focal deficits [Impaired Insight] : insight and judgment were intact [Oriented To Time, Place, And Person] : oriented to person, place, and time [Affect] : the affect was normal [Exaggerated Use Of Accessory Muscles For Inspiration] : no accessory muscle use [FreeTextEntry1] : trace b/l LE edema

## 2020-03-26 ENCOUNTER — APPOINTMENT (OUTPATIENT)
Dept: NEPHROLOGY | Facility: CLINIC | Age: 50
End: 2020-03-26

## 2020-03-27 ENCOUNTER — APPOINTMENT (OUTPATIENT)
Dept: NEPHROLOGY | Facility: CLINIC | Age: 50
End: 2020-03-27
Payer: COMMERCIAL

## 2020-03-27 PROCEDURE — G2012 BRIEF CHECK IN BY MD/QHP: CPT

## 2020-05-20 LAB
ALBUMIN SERPL ELPH-MCNC: 4.1 G/DL
ALP BLD-CCNC: 105 U/L
ALT SERPL-CCNC: 12 U/L
ANION GAP SERPL CALC-SCNC: 15 MMOL/L
APPEARANCE: CLEAR
AST SERPL-CCNC: 9 U/L
BACTERIA: NEGATIVE
BASOPHILS # BLD AUTO: 0.03 K/UL
BASOPHILS NFR BLD AUTO: 0.5 %
BILIRUB SERPL-MCNC: 0.2 MG/DL
BILIRUBIN URINE: NEGATIVE
BLOOD URINE: NEGATIVE
BUN SERPL-MCNC: 35 MG/DL
CALCIUM SERPL-MCNC: 9.1 MG/DL
CHLORIDE SERPL-SCNC: 100 MMOL/L
CHOLEST SERPL-MCNC: 206 MG/DL
CHOLEST/HDLC SERPL: 7.4 RATIO
CO2 SERPL-SCNC: 27 MMOL/L
COLOR: NORMAL
CREAT SERPL-MCNC: 3.27 MG/DL
EOSINOPHIL # BLD AUTO: 0.13 K/UL
EOSINOPHIL NFR BLD AUTO: 2.2 %
GLUCOSE QUALITATIVE U: ABNORMAL
GLUCOSE SERPL-MCNC: 217 MG/DL
HCT VFR BLD CALC: 38.4 %
HDLC SERPL-MCNC: 28 MG/DL
HGB BLD-MCNC: 12.4 G/DL
HYALINE CASTS: 2 /LPF
IMM GRANULOCYTES NFR BLD AUTO: 0.5 %
KETONES URINE: NEGATIVE
LDLC SERPL CALC-MCNC: 114 MG/DL
LEUKOCYTE ESTERASE URINE: NEGATIVE
LYMPHOCYTES # BLD AUTO: 1.49 K/UL
LYMPHOCYTES NFR BLD AUTO: 25.1 %
MAN DIFF?: NORMAL
MCHC RBC-ENTMCNC: 29.8 PG
MCHC RBC-ENTMCNC: 32.3 GM/DL
MCV RBC AUTO: 92.3 FL
MICROSCOPIC-UA: NORMAL
MONOCYTES # BLD AUTO: 0.65 K/UL
MONOCYTES NFR BLD AUTO: 10.9 %
NEUTROPHILS # BLD AUTO: 3.61 K/UL
NEUTROPHILS NFR BLD AUTO: 60.8 %
NITRITE URINE: NEGATIVE
PH URINE: 6
PHOSPHATE SERPL-MCNC: 4.6 MG/DL
PLATELET # BLD AUTO: 242 K/UL
POTASSIUM SERPL-SCNC: 3.7 MMOL/L
PROT SERPL-MCNC: 6.7 G/DL
PROTEIN URINE: ABNORMAL
RBC # BLD: 4.16 M/UL
RBC # FLD: 13.6 %
RED BLOOD CELLS URINE: 1 /HPF
SODIUM SERPL-SCNC: 142 MMOL/L
SPECIFIC GRAVITY URINE: 1.02
SQUAMOUS EPITHELIAL CELLS: 0 /HPF
TRIGL SERPL-MCNC: 318 MG/DL
URATE SERPL-MCNC: 10.2 MG/DL
UROBILINOGEN URINE: NORMAL
WBC # FLD AUTO: 5.94 K/UL
WHITE BLOOD CELLS URINE: 1 /HPF

## 2020-06-01 ENCOUNTER — APPOINTMENT (OUTPATIENT)
Dept: NEPHROLOGY | Facility: CLINIC | Age: 50
End: 2020-06-01
Payer: COMMERCIAL

## 2020-06-01 VITALS — HEART RATE: 78 BPM | DIASTOLIC BLOOD PRESSURE: 70 MMHG | SYSTOLIC BLOOD PRESSURE: 127 MMHG

## 2020-06-01 PROCEDURE — 99214 OFFICE O/P EST MOD 30 MIN: CPT

## 2020-06-01 NOTE — ASSESSMENT
[FreeTextEntry1] : reviewed lab results in detail with patient from 5/20/20\par \par 50 yo man with CKD 4, uncontrolled HTN, proteinuria, DMT2 and prior h/o STAN\par \par -HTN- BP at goal today.  Good compliance to medication regimen. Continue as follows: - AM: torsemide 10mg, amlodipine 5mg, carvedilol 25mg, hydralazine 100mg; afternoon: hydralazine 100mg; PM: losartan 50mg, isosorbide 30mg, carvedilol 25mg, hydralazine 100mg.   \par Wrist monitor previously correlated well with systolic readings compared to office device, diastolic readings about 15mmHg too high.  Recommended arm cuff, but not covered by insurance? Continue with routine home BP monitoring.\par Again encouraged weight loss, looking into healthier food options. \par  \par -CKD 4-  creat 3.27 (eGFR 21), new peak but overall within range of variability.  prior range 2.6-2.72.  (episode of STAN 5/2019 kev from baseline 1.7-1.9 range).  Continue focus on improving  A1C and BP; limit protein intake; avoiding NSAIDs\par Reiterated to likelihood of needing dialysis and/or kidney transplant in the future.  He has a few family members who are willing to donate a kidney, continue to discuss with them.  Will defer kidney biopsy for now, likely due to DM. \par \par -proteinuria-- nephrotic range, last checked: 3.5g, stable- continue losartan 50mg daily, defer increasing due to rising Cr.  As above, optimize BP and A1C.  GN workup negative\par -DM-  needs to optimize A1C control- following up with Dr. Burris \par -ALMAS - to start BIPAP or CPAP soon, f/u with sleep center.  Should improve BP.  Continue weight loss efforts.\par -HLD - not at goal, pt to discuss with PCP about increasing atorvastatin \par -hyperuremia - uric acid 10.2 (increased).  no hx gout attacks, monitor. \par \par Letter written to continue medical leave.  Expect he should be able to return in Aug.  \par repeat labs prior to next OV\par f/u 2 months

## 2020-06-01 NOTE — HISTORY OF PRESENT ILLNESS
[FreeTextEntry1] : 50 yo M following up for CKD 4, proteinuria, HTN and DMT2\par Continues to be on medical leave.  Reporting home BP to still be 140-150s systolic.  continues to be more engaged with his health, compliant with all medications.  blood glucose has been around 120s per pt.\par He completed CPAP titration, should be getting CPAP soon.\par Reports that he has not had much leg swelling recently.  States he has been taking 10mg torsemide (vs 20mg as documented).\par He has two jobs to support his children in school, has been doing so for years.  \par Had previously spoken to aunt who had transplant.  \par 12/2019: Optometry revealed mild proliferative changes due to DM, f/u 1 year.\par walks for exercise.  Has been more serious about healthy diet.  \par No NSAID use\par Denies HA, CP, SOB, dizziness. Frothy urine but no flank pain, dysuria or hematuria\par \par PCP: Dr. Corwin Foster\par endo: Dr. Oh Burris\par \par PMH: \par In [facility] May 2019 for accelerated HTN and found to have STAN- with increase in creat to 2.4 from 1.79 baseline. Is aware that he had not been focused on BP and also A1C elevated-

## 2020-08-03 ENCOUNTER — APPOINTMENT (OUTPATIENT)
Dept: NEPHROLOGY | Facility: CLINIC | Age: 50
End: 2020-08-03
Payer: COMMERCIAL

## 2020-08-03 VITALS — SYSTOLIC BLOOD PRESSURE: 164 MMHG | HEART RATE: 68 BPM | DIASTOLIC BLOOD PRESSURE: 77 MMHG

## 2020-08-03 PROCEDURE — 99214 OFFICE O/P EST MOD 30 MIN: CPT

## 2020-08-03 RX ORDER — AZELASTINE HYDROCHLORIDE 205.5 UG/1
0.15 SPRAY, METERED NASAL
Refills: 0 | Status: DISCONTINUED | COMMUNITY
End: 2020-08-03

## 2020-08-03 RX ORDER — MAGNESIUM OXIDE 241.3 MG/1000MG
400 TABLET ORAL
Refills: 0 | Status: DISCONTINUED | COMMUNITY
End: 2020-08-03

## 2020-08-03 NOTE — PHYSICAL EXAM
[General Appearance - Alert] : alert [General Appearance - In No Acute Distress] : in no acute distress [Sclera] : the sclera and conjunctiva were normal [Outer Ear] : the ears and nose were normal in appearance [Extraocular Movements] : extraocular movements were intact [Neck Appearance] : the appearance of the neck was normal [Heart Rate And Rhythm] : heart rate was normal and rhythm regular [Heart Sounds] : normal S1 and S2 [Auscultation Breath Sounds / Voice Sounds] : lungs were clear to auscultation bilaterally [Murmurs] : no murmurs [Heart Sounds Pericardial Friction Rub] : no pericardial rub [Heart Sounds Gallop] : no gallops [Cervical Lymph Nodes Enlarged Anterior Bilaterally] : anterior cervical [Supraclavicular Lymph Nodes Enlarged Bilaterally] : supraclavicular [No Spinal Tenderness] : no spinal tenderness [No CVA Tenderness] : no ~M costovertebral angle tenderness [Abnormal Walk] : normal gait [Involuntary Movements] : no involuntary movements were seen [No Focal Deficits] : no focal deficits [] : no rash [Affect] : the affect was normal [Oriented To Time, Place, And Person] : oriented to person, place, and time [FreeTextEntry1] : trace LE edema, R>L

## 2020-08-03 NOTE — HISTORY OF PRESENT ILLNESS
[FreeTextEntry1] : 50 yo M following up for CKD 4, proteinuria, HTN and DMT2\par On medical leave since March.  Has been feeling well.  Started on nasal CPAP. Reports home BP as 140s/80s.  He did not take his medications today, otherwise compliant to regimen.  Rushed to be on time for this appointment, got a flat tire.  \par BS controlled, will be following up with endo.\par LE swelling also controlled.  On ?10mg torsemide (vs 20mg as documented).\par He has two jobs to support his children in school, has been doing so for years.  \par No NSAID use\par Denies HA, CP, SOB, dizziness. Frothy urine but no flank pain, dysuria or hematuria\par \par PCP: Dr. Corwin Foster\par endo: Dr. Oh Burris (retired)\par \par PMH: \par In [facility] May 2019 for accelerated HTN and found to have STAN- with increase in creat to 2.4 from 1.79 baseline. Is aware that he had not been focused on BP and also A1C elevated-\par 12/2019: Optometry revealed mild proliferative changes due to DM, f/u 1 year.

## 2020-08-03 NOTE — ASSESSMENT
[FreeTextEntry1] : 48 yo man with CKD 4, uncontrolled HTN, proteinuria, DMT2 and prior h/o STAN\par -HTN- BP elevated today, may be partially attributed to skipping AM meds and stress from commute here this morning.  Otherwise good compliance to medication regimen. Continue as follows: - AM: torsemide 10mg, amlodipine 5mg, carvedilol 25mg, hydralazine 100mg; afternoon: hydralazine 100mg; PM: losartan 50mg, isosorbide 30mg, carvedilol 25mg, hydralazine 100mg.   \par Wrist monitor previously correlated well with systolic readings compared to office device, diastolic readings about 15mmHg too high.  Recommended arm cuff, but not covered by insurance? Continue with routine home BP monitoring.\par Again encouraged weight loss, looking into healthier food options. \par  \par -CKD 4-  last creat 3.27 (eGFR 21), new peak but overall within range of variability.  prior range 2.6-2.72.  (episode of STAN 5/2019 kev from baseline 1.7-1.9 range).  \par For repeat labs, will go sometime within the next week.  \par Continue focus on improving  A1C and BP; limit protein intake; avoiding NSAIDs\par Likelihood of needing dialysis and/or kidney transplant in the future.  He has a few family members who are willing to donate a kidney, continue to discuss with them. \par -proteinuria-- nephrotic range, last checked: 3.5g, stable- continue losartan 50mg daily, defer increasing due to rising Cr.  As above, optimize BP and A1C.  GN workup negative\par -DM-  needs to optimize A1C control- following up with endo\par -ALMAS - c/w CPAP and weight loss efforts.\par -HLD - not at goal, pt to discuss with PCP about increasing atorvastatin \par -hyperuremia - uric acid 10.2 (increased).  no hx gout attacks, monitor. Discussed potential symptoms to be aware of.\par \par Letter written to return to work, full duty without restrictions.\par repeat labs soon\par f/u 2 months

## 2020-10-09 ENCOUNTER — APPOINTMENT (OUTPATIENT)
Dept: NEPHROLOGY | Facility: CLINIC | Age: 50
End: 2020-10-09
Payer: COMMERCIAL

## 2020-10-09 VITALS — HEART RATE: 70 BPM | DIASTOLIC BLOOD PRESSURE: 72 MMHG | SYSTOLIC BLOOD PRESSURE: 137 MMHG

## 2020-10-09 LAB
ALBUMIN SERPL ELPH-MCNC: 4 G/DL
ANION GAP SERPL CALC-SCNC: 15 MMOL/L
APPEARANCE: CLEAR
BACTERIA: NEGATIVE
BASOPHILS # BLD AUTO: 0.07 K/UL
BASOPHILS NFR BLD AUTO: 0.8 %
BILIRUBIN URINE: NEGATIVE
BLOOD URINE: NEGATIVE
BUN SERPL-MCNC: 38 MG/DL
CALCIUM SERPL-MCNC: 9.1 MG/DL
CHLORIDE SERPL-SCNC: 104 MMOL/L
CO2 SERPL-SCNC: 25 MMOL/L
COLOR: COLORLESS
CREAT SERPL-MCNC: 3.22 MG/DL
CREAT SPEC-SCNC: 61 MG/DL
CREAT/PROT UR: 1 RATIO
EOSINOPHIL # BLD AUTO: 0.16 K/UL
EOSINOPHIL NFR BLD AUTO: 1.9 %
ESTIMATED AVERAGE GLUCOSE: 171 MG/DL
GLUCOSE QUALITATIVE U: ABNORMAL
GLUCOSE SERPL-MCNC: 203 MG/DL
HBA1C MFR BLD HPLC: 7.6 %
HCT VFR BLD CALC: 37.9 %
HGB BLD-MCNC: 12.1 G/DL
HYALINE CASTS: 1 /LPF
IMM GRANULOCYTES NFR BLD AUTO: 0.3 %
KETONES URINE: NEGATIVE
LEUKOCYTE ESTERASE URINE: NEGATIVE
LYMPHOCYTES # BLD AUTO: 1.85 K/UL
LYMPHOCYTES NFR BLD AUTO: 21.5 %
MAN DIFF?: NORMAL
MCHC RBC-ENTMCNC: 30.3 PG
MCHC RBC-ENTMCNC: 31.9 GM/DL
MCV RBC AUTO: 94.8 FL
MICROSCOPIC-UA: NORMAL
MONOCYTES # BLD AUTO: 0.78 K/UL
MONOCYTES NFR BLD AUTO: 9 %
NEUTROPHILS # BLD AUTO: 5.73 K/UL
NEUTROPHILS NFR BLD AUTO: 66.5 %
NITRITE URINE: NEGATIVE
PH URINE: 6
PHOSPHATE SERPL-MCNC: 4.2 MG/DL
PLATELET # BLD AUTO: 249 K/UL
POTASSIUM SERPL-SCNC: 4.2 MMOL/L
PROT UR-MCNC: 61 MG/DL
PROTEIN URINE: ABNORMAL
RBC # BLD: 4 M/UL
RBC # FLD: 13.2 %
RED BLOOD CELLS URINE: 2 /HPF
SODIUM SERPL-SCNC: 145 MMOL/L
SPECIFIC GRAVITY URINE: 1.01
SQUAMOUS EPITHELIAL CELLS: 0 /HPF
URATE SERPL-MCNC: 10.4 MG/DL
UROBILINOGEN URINE: NORMAL
WBC # FLD AUTO: 8.62 K/UL
WHITE BLOOD CELLS URINE: 1 /HPF

## 2020-10-09 PROCEDURE — 99214 OFFICE O/P EST MOD 30 MIN: CPT

## 2020-10-09 NOTE — ASSESSMENT
[FreeTextEntry1] : 51 yo man with CKD 4, uncontrolled HTN, proteinuria, DMT2 and prior h/o STAN\par --HTN- BP acceptable with good compliance to medication regimen. Continue as follows: - AM: torsemide 10mg, amlodipine 5mg, carvedilol 25mg, hydralazine 100mg; afternoon: hydralazine 100mg; PM: losartan 50mg, isosorbide 30mg, carvedilol 25mg, hydralazine 100mg.   \par Wrist monitor previously correlated well with systolic readings compared to office device, diastolic readings about 15mmHg too high.  Recommended arm cuff if able to get.  Continue with routine home BP monitoring.\par Continue encouraged weight loss efforts and healthy lifestyle choices. \par  --CKD 4-  creat 3.22 (eGFR 25), stable from last check in May.  overall progression from prior range 2.6-2.72.  (episode of STAN 5/2019 kev from baseline 1.7-1.9 range).  consider reducing torsemide to half tabs daily and only full tabs prn\par Emphasized focus on improving  A1C and BP; limit protein intake; avoiding NSAIDs\par Revisited discussion of likely need for dialysis and/or kidney transplant in the future.  He has a few family members who are willing to donate a kidney, continue to discuss with them. \par --proteinuria-- PCR 1g, improved from nephrotic range- continue losartan 50mg daily, defer increasing due to rising Cr.  As above, optimize BP and A1C.  GN workup negative,\par --DM- A1C 7.6% - better!  following up with endo\par --ALMAS - c/w CPAP and weight loss efforts.\par --HLD - LP not at goal.  Pt does not recall last time he had taken statin. renewed today. f/u with PCP.\par --hyperuremia - uric acid 10.4.  no hx gout attacks, monitor.  To be mindful of symptoms and inform us if any issues.\par \par f/u 3 months

## 2020-10-09 NOTE — PHYSICAL EXAM
[General Appearance - Alert] : alert [General Appearance - In No Acute Distress] : in no acute distress [Sclera] : the sclera and conjunctiva were normal [Extraocular Movements] : extraocular movements were intact [Outer Ear] : the ears and nose were normal in appearance [Neck Appearance] : the appearance of the neck was normal [Auscultation Breath Sounds / Voice Sounds] : lungs were clear to auscultation bilaterally [Heart Rate And Rhythm] : heart rate was normal and rhythm regular [Heart Sounds] : normal S1 and S2 [Heart Sounds Gallop] : no gallops [Murmurs] : no murmurs [Heart Sounds Pericardial Friction Rub] : no pericardial rub [Cervical Lymph Nodes Enlarged Anterior Bilaterally] : anterior cervical [Supraclavicular Lymph Nodes Enlarged Bilaterally] : supraclavicular [No CVA Tenderness] : no ~M costovertebral angle tenderness [No Spinal Tenderness] : no spinal tenderness [Abnormal Walk] : normal gait [Involuntary Movements] : no involuntary movements were seen [] : no rash [No Focal Deficits] : no focal deficits [Oriented To Time, Place, And Person] : oriented to person, place, and time [Affect] : the affect was normal [Edema] : there was no peripheral edema

## 2020-10-09 NOTE — HISTORY OF PRESENT ILLNESS
[FreeTextEntry1] : 49 yo M following up for CKD 4, proteinuria, HTN and DMT2\par Has been back to work for past 2 months after several months of medical leave.  Greater responsibilities at work but doing well overall.  \par Home BPs reported to be 130-140 systolic. Compliant with medication regimen, consistent with nasal CPAP. \par Has not followed up with endo yet, will be walking to office today to schedule.\par LE swelling also controlled on torsemide 20mg.  Mild swelling reported if sitting at work for long time.\par No NSAID use\par Denies HA, CP, SOB, dizziness. Frothy urine but no flank pain, dysuria or hematuria\par \par PCP: Dr. Corwin Foster\par endo: Dr. Oh Burris (retired)\par \par PMH: \par In [facility] May 2019 for accelerated HTN and found to have STAN- with increase in creat to 2.4 from 1.79 baseline. Is aware that he had not been focused on BP and also A1C elevated-\par 12/2019: Optometry revealed mild proliferative changes due to DM, f/u 1 year.

## 2021-01-05 ENCOUNTER — APPOINTMENT (OUTPATIENT)
Dept: NEPHROLOGY | Facility: CLINIC | Age: 51
End: 2021-01-05
Payer: COMMERCIAL

## 2021-01-05 VITALS — SYSTOLIC BLOOD PRESSURE: 150 MMHG | DIASTOLIC BLOOD PRESSURE: 80 MMHG | HEART RATE: 74 BPM

## 2021-01-05 LAB
25(OH)D3 SERPL-MCNC: 23.1 NG/ML
ALBUMIN SERPL ELPH-MCNC: 4.1 G/DL
ANION GAP SERPL CALC-SCNC: 15 MMOL/L
BASOPHILS # BLD AUTO: 0.08 K/UL
BASOPHILS NFR BLD AUTO: 1 %
BUN SERPL-MCNC: 38 MG/DL
CALCIUM SERPL-MCNC: 9.5 MG/DL
CALCIUM SERPL-MCNC: 9.5 MG/DL
CHLORIDE SERPL-SCNC: 106 MMOL/L
CO2 SERPL-SCNC: 24 MMOL/L
CREAT SERPL-MCNC: 3.62 MG/DL
EOSINOPHIL # BLD AUTO: 0.15 K/UL
EOSINOPHIL NFR BLD AUTO: 1.9 %
ESTIMATED AVERAGE GLUCOSE: 171 MG/DL
GLUCOSE SERPL-MCNC: 162 MG/DL
HBA1C MFR BLD HPLC: 7.6 %
HCT VFR BLD CALC: 38.3 %
HGB BLD-MCNC: 12.2 G/DL
IMM GRANULOCYTES NFR BLD AUTO: 0.6 %
LYMPHOCYTES # BLD AUTO: 2.37 K/UL
LYMPHOCYTES NFR BLD AUTO: 30.1 %
MAN DIFF?: NORMAL
MCHC RBC-ENTMCNC: 29.5 PG
MCHC RBC-ENTMCNC: 31.9 GM/DL
MCV RBC AUTO: 92.7 FL
MONOCYTES # BLD AUTO: 0.87 K/UL
MONOCYTES NFR BLD AUTO: 11 %
NEUTROPHILS # BLD AUTO: 4.36 K/UL
NEUTROPHILS NFR BLD AUTO: 55.4 %
PARATHYROID HORMONE INTACT: 240 PG/ML
PHOSPHATE SERPL-MCNC: 4.4 MG/DL
PLATELET # BLD AUTO: 244 K/UL
POTASSIUM SERPL-SCNC: 4.5 MMOL/L
RBC # BLD: 4.13 M/UL
RBC # FLD: 13.2 %
SODIUM SERPL-SCNC: 145 MMOL/L
URATE SERPL-MCNC: 10.8 MG/DL
WBC # FLD AUTO: 7.88 K/UL

## 2021-01-05 PROCEDURE — 99214 OFFICE O/P EST MOD 30 MIN: CPT

## 2021-01-05 PROCEDURE — 99072 ADDL SUPL MATRL&STAF TM PHE: CPT

## 2021-01-05 NOTE — ASSESSMENT
[FreeTextEntry1] : 49 yo man with CKD 4, uncontrolled HTN, proteinuria, DMT2 and prior h/o STAN\par --HTN- BP above goal.  Will increase amlodipine.  Written instructions given for regimen as follows: - AM: torsemide 10mg, amlodipine 10mg, carvedilol 25mg, hydralazine 100mg; afternoon: hydralazine 100mg; PM: losartan 50mg, isosorbide 30mg, carvedilol 25mg, hydralazine 100mg.   \par Wrist monitor previously correlated well with systolic readings compared to office device, diastolic readings about 15mmHg too high.  Continue with routine home BP monitoring.\par Continue encouraged weight loss efforts and healthy lifestyle choices. \par  --CKD 4-  creat 3.62 (eGFR 22), hemodynamic variability vs progression.  overall progression from prior range 2.6-2.72.  (episode of STAN 5/2019 kev from baseline 1.7-1.9 range).  reduce torsemide from 20 to 10mg daily.  \par To be mindful of increased edema on higher amlodipine and reduced diuretic.  \par Emphasized focus on improving  A1C and BP; limit protein intake; avoiding NSAIDs\par Previously discussed likely need for dialysis and/or kidney transplant in the future.  He has a few family members who are willing to donate a kidney, continue to discuss with them. \par --proteinuria-- PCR 1g, improved from nephrotic range- continue losartan 50mg daily, defer increasing due to rising Cr.  As above, optimize BP and A1C.  GN workup negative.\par --DM- A1C 7.6% stable. Emphasized importance of endo f/u.  He will go to schedule now to be seen soon.  Should have discussion about coming off Glyxambi due to eGFR <30.  \par Needs follow-up optometry examination this year.\par --ALMAS - c/w CPAP and weight loss efforts.\par --HLD - LP not at goal, statin was restarted.  recheck LP.  carotid dopplers pending.\par --hyperuremia - uric acid 10.4.  no hx gout attacks, monitor.  To be mindful of symptoms and inform us if any issues.\par --hyperparathyroidism - secondary vs tertiary.  Trial of ergocalciferol weekly, then recheck levels.\par \par f/u 3 months

## 2021-01-05 NOTE — PHYSICAL EXAM
[General Appearance - Alert] : alert [General Appearance - In No Acute Distress] : in no acute distress [Sclera] : the sclera and conjunctiva were normal [Extraocular Movements] : extraocular movements were intact [Outer Ear] : the ears and nose were normal in appearance [Neck Appearance] : the appearance of the neck was normal [Auscultation Breath Sounds / Voice Sounds] : lungs were clear to auscultation bilaterally [Heart Rate And Rhythm] : heart rate was normal and rhythm regular [Heart Sounds] : normal S1 and S2 [Murmurs] : no murmurs [Heart Sounds Gallop] : no gallops [Heart Sounds Pericardial Friction Rub] : no pericardial rub [Edema] : there was no peripheral edema [Cervical Lymph Nodes Enlarged Anterior Bilaterally] : anterior cervical [Supraclavicular Lymph Nodes Enlarged Bilaterally] : supraclavicular [No CVA Tenderness] : no ~M costovertebral angle tenderness [No Spinal Tenderness] : no spinal tenderness [Abnormal Walk] : normal gait [Involuntary Movements] : no involuntary movements were seen [] : no rash [No Focal Deficits] : no focal deficits [Oriented To Time, Place, And Person] : oriented to person, place, and time [Affect] : the affect was normal

## 2021-01-05 NOTE — HISTORY OF PRESENT ILLNESS
[FreeTextEntry1] : 51 yo M following up for CKD 4, proteinuria, HTN and DMT2.\par Feeling well overall.  Busy with two jobs.  \par Reports feeling fleeting episodes of left sided numbness lasting up to a minute.  Had echo and duplex carotid US done on 12/31 for PCP.  Results pending.  \par Has not followed up with endocrine since Dr. Burris retired.  \par BP at home reported to be in 140-150s lately.  Compliant with medications and nasal CPAP.  \par LE swelling controlled on daily torsemide.  Mild swelling reported if sitting at work for long time.\par No NSAID use\par Denies HA, CP, SOB, dizziness. Frothy urine but no flank pain, dysuria or hematuria\par \par PCP: Dr. Corwin Foster\par endo: Dr. Oh Burris (retired)\par \par PMH: \par In [facility] May 2019 for accelerated HTN and found to have STAN- with increase in creat to 2.4 from 1.79 baseline. Is aware that he had not been focused on BP and also A1C elevated-\par 12/2019: Optometry revealed mild proliferative changes due to DM, f/u 1 year.

## 2021-01-20 ENCOUNTER — RX RENEWAL (OUTPATIENT)
Age: 51
End: 2021-01-20

## 2021-03-27 ENCOUNTER — EMERGENCY (EMERGENCY)
Facility: HOSPITAL | Age: 51
LOS: 1 days | Discharge: HOME | End: 2021-03-27
Attending: EMERGENCY MEDICINE | Admitting: EMERGENCY MEDICINE
Payer: COMMERCIAL

## 2021-03-27 VITALS
WEIGHT: 205.03 LBS | HEIGHT: 69 IN | TEMPERATURE: 98 F | RESPIRATION RATE: 18 BRPM | DIASTOLIC BLOOD PRESSURE: 91 MMHG | HEART RATE: 76 BPM | OXYGEN SATURATION: 97 % | SYSTOLIC BLOOD PRESSURE: 186 MMHG

## 2021-03-27 VITALS
TEMPERATURE: 98 F | SYSTOLIC BLOOD PRESSURE: 172 MMHG | HEART RATE: 77 BPM | OXYGEN SATURATION: 97 % | DIASTOLIC BLOOD PRESSURE: 86 MMHG | RESPIRATION RATE: 18 BRPM

## 2021-03-27 DIAGNOSIS — E11.9 TYPE 2 DIABETES MELLITUS WITHOUT COMPLICATIONS: ICD-10-CM

## 2021-03-27 DIAGNOSIS — I10 ESSENTIAL (PRIMARY) HYPERTENSION: ICD-10-CM

## 2021-03-27 DIAGNOSIS — Z79.899 OTHER LONG TERM (CURRENT) DRUG THERAPY: ICD-10-CM

## 2021-03-27 DIAGNOSIS — Z79.02 LONG TERM (CURRENT) USE OF ANTITHROMBOTICS/ANTIPLATELETS: ICD-10-CM

## 2021-03-27 DIAGNOSIS — R06.02 SHORTNESS OF BREATH: ICD-10-CM

## 2021-03-27 DIAGNOSIS — Z90.49 ACQUIRED ABSENCE OF OTHER SPECIFIED PARTS OF DIGESTIVE TRACT: ICD-10-CM

## 2021-03-27 DIAGNOSIS — Z79.83 LONG TERM (CURRENT) USE OF BISPHOSPHONATES: ICD-10-CM

## 2021-03-27 DIAGNOSIS — Z79.82 LONG TERM (CURRENT) USE OF ASPIRIN: ICD-10-CM

## 2021-03-27 DIAGNOSIS — Z90.49 ACQUIRED ABSENCE OF OTHER SPECIFIED PARTS OF DIGESTIVE TRACT: Chronic | ICD-10-CM

## 2021-03-27 DIAGNOSIS — E78.5 HYPERLIPIDEMIA, UNSPECIFIED: ICD-10-CM

## 2021-03-27 DIAGNOSIS — Z79.811 LONG TERM (CURRENT) USE OF AROMATASE INHIBITORS: ICD-10-CM

## 2021-03-27 LAB
ALBUMIN SERPL ELPH-MCNC: 3.9 G/DL — SIGNIFICANT CHANGE UP (ref 3.3–5)
ALP SERPL-CCNC: 138 U/L — HIGH (ref 40–120)
ALT FLD-CCNC: 9 U/L — LOW (ref 10–45)
ANION GAP SERPL CALC-SCNC: 9 MMOL/L — SIGNIFICANT CHANGE UP (ref 5–17)
AST SERPL-CCNC: 11 U/L — SIGNIFICANT CHANGE UP (ref 10–40)
BASOPHILS # BLD AUTO: 0.05 K/UL — SIGNIFICANT CHANGE UP (ref 0–0.2)
BASOPHILS NFR BLD AUTO: 0.7 % — SIGNIFICANT CHANGE UP (ref 0–2)
BILIRUB SERPL-MCNC: 0.6 MG/DL — SIGNIFICANT CHANGE UP (ref 0.2–1.2)
BUN SERPL-MCNC: 27 MG/DL — HIGH (ref 7–23)
CALCIUM SERPL-MCNC: 9.1 MG/DL — SIGNIFICANT CHANGE UP (ref 8.4–10.5)
CHLORIDE SERPL-SCNC: 104 MMOL/L — SIGNIFICANT CHANGE UP (ref 96–108)
CO2 SERPL-SCNC: 31 MMOL/L — SIGNIFICANT CHANGE UP (ref 22–31)
CREAT SERPL-MCNC: 2.77 MG/DL — HIGH (ref 0.5–1.3)
EOSINOPHIL # BLD AUTO: 0.08 K/UL — SIGNIFICANT CHANGE UP (ref 0–0.5)
EOSINOPHIL NFR BLD AUTO: 1.1 % — SIGNIFICANT CHANGE UP (ref 0–6)
GLUCOSE SERPL-MCNC: 157 MG/DL — HIGH (ref 70–99)
HCT VFR BLD CALC: 35.3 % — LOW (ref 39–50)
HGB BLD-MCNC: 11.5 G/DL — LOW (ref 13–17)
IMM GRANULOCYTES NFR BLD AUTO: 0.3 % — SIGNIFICANT CHANGE UP (ref 0–1.5)
LYMPHOCYTES # BLD AUTO: 1.77 K/UL — SIGNIFICANT CHANGE UP (ref 1–3.3)
LYMPHOCYTES # BLD AUTO: 25.2 % — SIGNIFICANT CHANGE UP (ref 13–44)
MCHC RBC-ENTMCNC: 30 PG — SIGNIFICANT CHANGE UP (ref 27–34)
MCHC RBC-ENTMCNC: 32.6 GM/DL — SIGNIFICANT CHANGE UP (ref 32–36)
MCV RBC AUTO: 92.2 FL — SIGNIFICANT CHANGE UP (ref 80–100)
MONOCYTES # BLD AUTO: 0.76 K/UL — SIGNIFICANT CHANGE UP (ref 0–0.9)
MONOCYTES NFR BLD AUTO: 10.8 % — SIGNIFICANT CHANGE UP (ref 2–14)
NEUTROPHILS # BLD AUTO: 4.33 K/UL — SIGNIFICANT CHANGE UP (ref 1.8–7.4)
NEUTROPHILS NFR BLD AUTO: 61.9 % — SIGNIFICANT CHANGE UP (ref 43–77)
NRBC # BLD: 0 /100 WBCS — SIGNIFICANT CHANGE UP (ref 0–0)
PLATELET # BLD AUTO: 260 K/UL — SIGNIFICANT CHANGE UP (ref 150–400)
POTASSIUM SERPL-MCNC: 3.7 MMOL/L — SIGNIFICANT CHANGE UP (ref 3.5–5.3)
POTASSIUM SERPL-SCNC: 3.7 MMOL/L — SIGNIFICANT CHANGE UP (ref 3.5–5.3)
PROT SERPL-MCNC: 7.3 G/DL — SIGNIFICANT CHANGE UP (ref 6–8.3)
RBC # BLD: 3.83 M/UL — LOW (ref 4.2–5.8)
RBC # FLD: 13.1 % — SIGNIFICANT CHANGE UP (ref 10.3–14.5)
SODIUM SERPL-SCNC: 144 MMOL/L — SIGNIFICANT CHANGE UP (ref 135–145)
WBC # BLD: 7.01 K/UL — SIGNIFICANT CHANGE UP (ref 3.8–10.5)
WBC # FLD AUTO: 7.01 K/UL — SIGNIFICANT CHANGE UP (ref 3.8–10.5)

## 2021-03-27 PROCEDURE — 71046 X-RAY EXAM CHEST 2 VIEWS: CPT | Mod: 26

## 2021-03-27 PROCEDURE — 36415 COLL VENOUS BLD VENIPUNCTURE: CPT

## 2021-03-27 PROCEDURE — 93005 ELECTROCARDIOGRAM TRACING: CPT

## 2021-03-27 PROCEDURE — 80053 COMPREHEN METABOLIC PANEL: CPT

## 2021-03-27 PROCEDURE — 85025 COMPLETE CBC W/AUTO DIFF WBC: CPT

## 2021-03-27 PROCEDURE — 82962 GLUCOSE BLOOD TEST: CPT

## 2021-03-27 PROCEDURE — 99283 EMERGENCY DEPT VISIT LOW MDM: CPT | Mod: 25

## 2021-03-27 PROCEDURE — 99284 EMERGENCY DEPT VISIT MOD MDM: CPT

## 2021-03-27 PROCEDURE — 71046 X-RAY EXAM CHEST 2 VIEWS: CPT

## 2021-03-27 NOTE — ED PROVIDER NOTE - NS ED ATTENDING STATEMENT MOD
I have personally provided the amount of critical care time documented below excluding time spent on separate procedures. Attending Only

## 2021-03-27 NOTE — ED PROVIDER NOTE - CRITICAL CARE ATTENDING CONTRIBUTION TO CARE
Upon my evaluation, this patient had a high probability of imminent or life-threatening deterioration due to anaphylaxis and angioedema, which required my direct attention, intervention, and personal management.    I have personally provided 45 minutes of critical care time exclusive of time spent on separately billable procedures. Time includes review of laboratory data, radiology results, discussion with consultants, and monitoring for potential decompensation. Interventions were performed as documented above.

## 2021-03-27 NOTE — ED PROVIDER NOTE - PATIENT PORTAL LINK FT
You can access the FollowMyHealth Patient Portal offered by [facility] by registering at the following website: [email or web address]. By joining [facility]’s FollowMyHealth portal, you will also be able to view your health information using other applications (apps) compatible with our system.

## 2021-03-27 NOTE — ED PROVIDER NOTE - CARE PLAN
Principal Discharge DX:	Angioedema, initial encounter  Secondary Diagnosis:	Anaphylaxis, initial encounter   Principal Discharge DX:	SOB (shortness of breath)

## 2021-03-27 NOTE — ED ADULT NURSE NOTE - NSIMPLEMENTINTERV_GEN_ALL_ED
Implemented All Universal Safety Interventions:  [place] to call system. Call bell, personal items and telephone within reach. Instruct patient to call for assistance. Room bathroom lighting operational. Non-slip footwear when patient is off stretcher. Physically safe environment: no spills, clutter or unnecessary equipment. Stretcher in lowest position, wheels locked, appropriate side rails in place.

## 2021-03-27 NOTE — ED ADULT NURSE NOTE - OBJECTIVE STATEMENT
Pt came to ER stating "I started to have shortness of breath yesterday after I woke up from a nap to go to work at night. The shortness of breath was all night till 4am, I felt winded".

## 2021-03-27 NOTE — ED PROVIDER NOTE - OBJECTIVE STATEMENT
49 y/o F with PMHx HTN, HLD, DM, CKD stage 3, presents to the ED stating for the past few days he has noted SOB, and again last night when he woke up to go to work. Pt states he is working 2 jobs including one overnight shift and is usually very tired. States he felt 'winded' and SOB until the end of his nightshift but felt much better after arriving back home. Pt was texting his wife that he was tired all night and she became concerned and wanted him to be seen. Pt states he now feels back to normal and just wants to go to sleep. Denies any fever, chills, chest pain, diaphoresis, pedal edema or facial edema. States he has been able to exert himself as per normal and denies any decreased exercise capacity. Denies any covid-19 exposures and is routinely tested 2x a week, and states he was swabbed prior to finishing his shift today. 51 y/o F with PMHx HTN, HLD, DM, CKD stage 3, presents to the ED stating SOB last night when he woke up to go to work that lasted a few hours and then went away wihtout any intervention. Pt states he is working 2 jobs including one overnight shift and is usually very tired. States he felt 'winded' and SOB until the end of his nightshift but felt much better after arriving back home. Pt was texting his wife that he was tired all night and she became concerned and wanted him to be seen. Pt states he now feels back to normal and just wants to go to sleep. Denies any fever, chills, chest pain, diaphoresis, pedal edema or facial edema. States he has been able to exert himself as per normal and denies any decreased exercise capacity. Denies any covid-19 exposures and is routinely tested 2x a week, and states he was swabbed prior to finishing his shift today.

## 2021-03-27 NOTE — ED ADULT NURSE NOTE - CHPI ED NUR SYMPTOMS NEG
no body aches/no chest pain/no chills/no cough/no diaphoresis/no fever/no headache/no hemoptysis/no wheezing

## 2021-03-27 NOTE — ED PROVIDER NOTE - CLINICAL SUMMARY MEDICAL DECISION MAKING FREE TEXT BOX
51 y/o M here c/o SOB x the past few days. Will check pt's kidney function to r/o acute worsening causing pt to feel SOB, however most likely due to exhaustion from working 2 jobs including overnight shifts and resulting in complete lack of sleep. Will dc pt home w/ plan for close follow up w/ PCP. Has an appointment scheduled for this week.

## 2021-03-27 NOTE — ED PROVIDER NOTE - NSFOLLOWUPINSTRUCTIONS_ED_ALL_ED_FT
Your EKG and chest xray today look good, and blood work does not show any worsening of your kidney function. Please follow up with your doctors, and follow up the result of your covid-19 test done at work.    Shortness of breath    Shortness of breath (dyspnea) means you have trouble breathing and could indicate a medical problem. Causes include lung disease, heart disease, low amount of red blood cells (anemia), poor physical fitness, being overweight, smoking, etc. Your health care provider today may not be able to find a cause for your shortness of breath after your exam. In this case, it is important to have a follow-up exam with your primary care physician as instructed. If medicines were prescribed, take them as directed for the full length of time directed. Refrain from tobacco products.    SEEK IMMEDIATE MEDICAL CARE IF YOU HAVE ANY OF THE FOLLOWING SYMPTOMS: worsening shortness of breath, chest pain, back pain, abdominal pain, fever, coughing up blood, lightheadedness/dizziness.

## 2021-03-27 NOTE — ED PROVIDER NOTE - PMH
Diabetes    Hyperlipidemia    Hypertension     CKD (chronic kidney disease), stage III    Diabetes    Hyperlipidemia    Hypertension

## 2021-03-31 ENCOUNTER — APPOINTMENT (OUTPATIENT)
Dept: NEPHROLOGY | Facility: CLINIC | Age: 51
End: 2021-03-31
Payer: COMMERCIAL

## 2021-03-31 VITALS — SYSTOLIC BLOOD PRESSURE: 146 MMHG | DIASTOLIC BLOOD PRESSURE: 72 MMHG | HEART RATE: 75 BPM

## 2021-03-31 PROBLEM — N18.30 CHRONIC KIDNEY DISEASE, STAGE 3 UNSPECIFIED: Chronic | Status: ACTIVE | Noted: 2021-03-29

## 2021-03-31 PROCEDURE — 99214 OFFICE O/P EST MOD 30 MIN: CPT

## 2021-03-31 PROCEDURE — 99072 ADDL SUPL MATRL&STAF TM PHE: CPT

## 2021-03-31 RX ORDER — EMPAGLIFLOZIN AND LINAGLIPTIN 25; 5 MG/1; MG/1
25-5 TABLET, FILM COATED ORAL
Refills: 0 | Status: DISCONTINUED | COMMUNITY
End: 2021-03-31

## 2021-03-31 NOTE — HISTORY OF PRESENT ILLNESS
[FreeTextEntry1] : 51 yo M following up for CKD 4, proteinuria, HTN and DMT2.\par Has been doing well.  Went to ER a few days ago for SOB, no pertinent findings, resolved.    \par Followed up with new endocrinologist, saw her this AM.  Currently off meds but will be restart a new injectable based on labs.  \par BP at home 130-140s per his reporting.  Compliant with medications and nasal CPAP.  \par LE swelling controlled on daily torsemide.  Mild swelling reported if sitting at work for long time.\par No NSAID use\par Denies HA, CP, SOB, dizziness. Frothy urine but no flank pain, dysuria or hematuria\par \par PCP: Dr. Corwin Foster\par endo: Dr. Zenaida Macias\par \par last visit 1/7/21: Reports feeling fleeting episodes of left sided numbness lasting up to a minute.  Had echo and duplex carotid US done on 12/31 for PCP.  \par \par PMH: \par In [facility] May 2019 for accelerated HTN and found to have STAN- with increase in creat to 2.4 from 1.79 baseline. Is aware that he had not been focused on BP and also A1C elevated-\par \par 12/2019: Optometry revealed mild proliferative changes due to DM, f/u 1 year.

## 2021-03-31 NOTE — ASSESSMENT
[FreeTextEntry1] : reviewed ER lab results in detail with patient from 3/27/21\par 49 yo man with CKD 4, uncontrolled HTN, proteinuria, DMT2 and prior h/o STAN\par --HTN- BP above goal but better at home.  Continue current regimen an monitor routinely.  \par  AM: torsemide 10mg, amlodipine 10mg, carvedilol 25mg, hydralazine 100mg; afternoon: hydralazine 100mg; PM: losartan 50mg, isosorbide 30mg, carvedilol 25mg, hydralazine 100mg.   \par Wrist monitor previously correlated well with systolic readings compared to office device, diastolic readings about 15mmHg too high. \par Continue weight loss efforts and healthy lifestyle choices. \par --CKD 4-  creat 2.77 (eGFR 30), down from peak of 3.6.  Possibly hemodynamic change on reduced torsemide.  overall progression from prior range 2.6-2.72.  (episode of STAN 5/2019 kev from baseline 1.7-1.9 range).  \par To focus on improving  A1C and monitoring BP; limit protein intake; avoiding NSAIDs\par Previously discussed likely need for dialysis and/or kidney transplant in the future.  He has a few family members who are willing to donate a kidney, continue to discuss with them. \par --proteinuria-- last PCR 1g, improved from nephrotic range- continue losartan 50mg daily, defer increasing due to rising Cr.  As above, optimize BP and A1C.  GN workup negative.\par --DM- last A1C 7.6%, presumably rechecked today at endo office. Instructed to follow-up optometry examination this year.\par --ALMAS - c/w CPAP and weight loss efforts.\par --HLD - Back on statin, will recheck LP.  \par --hyperuremia - last uric acid 10.4.  no hx gout attacks, monitor.  To be mindful of symptoms and inform us if any issues.\par --hyperparathyroidism - secondary vs tertiary.  Trial of ergocalciferol weekly (renewed), then recheck levels.\par \par f/u 3 months

## 2021-03-31 NOTE — PHYSICAL EXAM
[General Appearance - Alert] : alert [General Appearance - In No Acute Distress] : in no acute distress [Sclera] : the sclera and conjunctiva were normal [Extraocular Movements] : extraocular movements were intact [Outer Ear] : the ears and nose were normal in appearance [Neck Appearance] : the appearance of the neck was normal [Auscultation Breath Sounds / Voice Sounds] : lungs were clear to auscultation bilaterally [Heart Rate And Rhythm] : heart rate was normal and rhythm regular [Heart Sounds] : normal S1 and S2 [Heart Sounds Gallop] : no gallops [Murmurs] : no murmurs [Heart Sounds Pericardial Friction Rub] : no pericardial rub [Edema] : there was no peripheral edema [Cervical Lymph Nodes Enlarged Anterior Bilaterally] : anterior cervical [Supraclavicular Lymph Nodes Enlarged Bilaterally] : supraclavicular [No CVA Tenderness] : no ~M costovertebral angle tenderness [No Spinal Tenderness] : no spinal tenderness [Abnormal Walk] : normal gait [Involuntary Movements] : no involuntary movements were seen [] : no rash [No Focal Deficits] : no focal deficits [Oriented To Time, Place, And Person] : oriented to person, place, and time [Affect] : the affect was normal

## 2021-06-17 ENCOUNTER — APPOINTMENT (OUTPATIENT)
Dept: NEPHROLOGY | Facility: CLINIC | Age: 51
End: 2021-06-17
Payer: COMMERCIAL

## 2021-06-17 VITALS — DIASTOLIC BLOOD PRESSURE: 80 MMHG | HEART RATE: 80 BPM | SYSTOLIC BLOOD PRESSURE: 156 MMHG

## 2021-06-17 PROCEDURE — 99214 OFFICE O/P EST MOD 30 MIN: CPT

## 2021-06-17 PROCEDURE — 99072 ADDL SUPL MATRL&STAF TM PHE: CPT

## 2021-06-18 LAB
24R-OH-CALCIDIOL SERPL-MCNC: 43.5 PG/ML
25(OH)D3 SERPL-MCNC: 28.5 NG/ML
ALBUMIN SERPL ELPH-MCNC: 4 G/DL
ANION GAP SERPL CALC-SCNC: 12 MMOL/L
APPEARANCE: CLEAR
BACTERIA: NEGATIVE
BASOPHILS # BLD AUTO: 0.05 K/UL
BASOPHILS NFR BLD AUTO: 0.8 %
BILIRUBIN URINE: NEGATIVE
BLOOD URINE: NEGATIVE
BUN SERPL-MCNC: 28 MG/DL
CALCIUM SERPL-MCNC: 9.1 MG/DL
CALCIUM SERPL-MCNC: 9.1 MG/DL
CHLORIDE SERPL-SCNC: 108 MMOL/L
CO2 SERPL-SCNC: 25 MMOL/L
COLOR: NORMAL
CREAT SERPL-MCNC: 2.67 MG/DL
CREAT SPEC-SCNC: 71 MG/DL
CREAT/PROT UR: 2.7 RATIO
EOSINOPHIL # BLD AUTO: 0.19 K/UL
EOSINOPHIL NFR BLD AUTO: 3.1 %
ESTIMATED AVERAGE GLUCOSE: 177 MG/DL
GLUCOSE QUALITATIVE U: NORMAL
GLUCOSE SERPL-MCNC: 155 MG/DL
HBA1C MFR BLD HPLC: 7.8 %
HCT VFR BLD CALC: 36.3 %
HGB BLD-MCNC: 11.7 G/DL
HYALINE CASTS: 1 /LPF
IMM GRANULOCYTES NFR BLD AUTO: 0.3 %
KETONES URINE: NEGATIVE
LEUKOCYTE ESTERASE URINE: NEGATIVE
LYMPHOCYTES # BLD AUTO: 2.01 K/UL
LYMPHOCYTES NFR BLD AUTO: 32.7 %
MAN DIFF?: NORMAL
MCHC RBC-ENTMCNC: 30.7 PG
MCHC RBC-ENTMCNC: 32.2 GM/DL
MCV RBC AUTO: 95.3 FL
MICROSCOPIC-UA: NORMAL
MONOCYTES # BLD AUTO: 0.57 K/UL
MONOCYTES NFR BLD AUTO: 9.3 %
NEUTROPHILS # BLD AUTO: 3.3 K/UL
NEUTROPHILS NFR BLD AUTO: 53.8 %
NITRITE URINE: NEGATIVE
PARATHYROID HORMONE INTACT: 223 PG/ML
PH URINE: 6
PHOSPHATE SERPL-MCNC: 3.7 MG/DL
PLATELET # BLD AUTO: 244 K/UL
POTASSIUM SERPL-SCNC: 4.2 MMOL/L
PROT UR-MCNC: 193 MG/DL
PROTEIN URINE: ABNORMAL
RBC # BLD: 3.81 M/UL
RBC # FLD: 12.9 %
RED BLOOD CELLS URINE: 1 /HPF
SODIUM SERPL-SCNC: 145 MMOL/L
SPECIFIC GRAVITY URINE: 1.01
SQUAMOUS EPITHELIAL CELLS: 0 /HPF
URATE SERPL-MCNC: 7.9 MG/DL
UROBILINOGEN URINE: NORMAL
WBC # FLD AUTO: 6.14 K/UL
WHITE BLOOD CELLS URINE: 1 /HPF

## 2021-06-18 NOTE — HISTORY OF PRESENT ILLNESS
[FreeTextEntry1] : 51 yo M following up for CKD 4, proteinuria, HTN and DMT2.\par \par Feeling very tired today after getting second Moderna COVID vaccine this morning.  Otherwise has been feeling well.  \par Recently started Ozempic to manage his DM.  Was on trial of Trulicity before that, not well tolerated.   \par Has not been checking home BP recently but had been good, 130-140s per his reporting.  Compliant with medications and nasal CPAP.  \par LE swelling controlled on daily torsemide.  Mild swelling reported if sitting at work for long time.\par No NSAID use\par Denies HA, CP, SOB, dizziness. Frothy urine but no flank pain, dysuria or hematuria\par \par PCP: Dr. Corwin Foster\par endo: Dr. Zenaida Macias\par \par 1/7/21: Reports feeling fleeting episodes of left sided numbness lasting up to a minute.  Had echo and duplex carotid US done on 12/31 for PCP.  \par 12/2019: Optometry revealed mild proliferative changes due to DM, f/u 1 year.\par \par PMH: \par In [facility] May 2019 for accelerated HTN and found to have STAN- with increase in creat to 2.4 from 1.79 baseline. Is aware that he had not been focused on BP and also A1C elevated-
No deformities present/Well developed

## 2021-06-18 NOTE — ASSESSMENT
[FreeTextEntry1] : 49 yo man with CKD 4, uncontrolled HTN, proteinuria, DMT2 and prior h/o STAN\par --HTN- BP remains above goal but better at home, possible some element of white coat.  Wrist monitor previously correlated well with systolic readings compared to office device, diastolic readings about 15mmHg too high. Continue current regimen an monitor routinely.  \par AM: torsemide 10mg, amlodipine 10mg, carvedilol 25mg, hydralazine 100mg; afternoon: hydralazine 100mg; PM: losartan 50mg, isosorbide 30mg, carvedilol 25mg, hydralazine 100mg.   \par consider changing amlodipine to nifedipine 60mg BID or trial of clonidine if home readings >140/90.\par Continue weight loss efforts and healthy lifestyle choices. \par --CKD 4- last creat 2.77 (eGFR 30), down from peak of 3.6.  Possibly hemodynamic change on reduced torsemide.  overall progression from prior range 2.6-2.72.  (episode of STAN 5/2019 kev from baseline 1.7-1.9 range).  \par To focus on improving  A1C and monitoring BP; limit protein intake; avoiding NSAIDs\par Previously discussed likely need for dialysis and/or kidney transplant in the future.  He has a few family members who are willing to donate a kidney, continue to discuss with them. \par --proteinuria-- last PCR 1g, improved from nephrotic range- continue losartan 50mg daily, defer increasing due to rising Cr.  As above, optimize BP and A1C.  GN workup negative.\par --DM- last A1C 7.6%, presumably rechecked with endo more recently. Instructed to follow-up optometry examination this year.\par --ALMAS - c/w CPAP and weight loss efforts.\par --HLD - Back on statin, will recheck LP later.  \par --hyperuremia - last uric acid 10.4.  no hx gout attacks, monitor.  To be mindful of symptoms and inform us if any issues.\par --hyperparathyroidism - secondary vs tertiary.  Completed course of ergocalciferol weekly, recheck levels.\par \par will call lab results\par f/u 2 months\par \par addendum 6/18: renal function stable, improved to 2.67 (eGFR 31).  Electrolytes good.  A1C 7.8%, missed endo appointment this morning, feeling even worse since covid vaccine yesterday.  He will call to reschedule.  \par PCR 2.7g, will continue to monitor.  restart ergo for secondary HPT.

## 2021-07-05 ENCOUNTER — RX RENEWAL (OUTPATIENT)
Age: 51
End: 2021-07-05

## 2021-07-17 ENCOUNTER — EMERGENCY (EMERGENCY)
Facility: HOSPITAL | Age: 51
LOS: 1 days | Discharge: HOME | End: 2021-07-17
Attending: EMERGENCY MEDICINE | Admitting: EMERGENCY MEDICINE
Payer: COMMERCIAL

## 2021-07-17 VITALS
DIASTOLIC BLOOD PRESSURE: 78 MMHG | SYSTOLIC BLOOD PRESSURE: 150 MMHG | OXYGEN SATURATION: 96 % | HEIGHT: 69 IN | RESPIRATION RATE: 16 BRPM | TEMPERATURE: 99 F | WEIGHT: 205.03 LBS | HEART RATE: 86 BPM

## 2021-07-17 VITALS
OXYGEN SATURATION: 99 % | RESPIRATION RATE: 18 BRPM | SYSTOLIC BLOOD PRESSURE: 168 MMHG | TEMPERATURE: 99 F | DIASTOLIC BLOOD PRESSURE: 89 MMHG | HEART RATE: 76 BPM

## 2021-07-17 DIAGNOSIS — Z90.49 ACQUIRED ABSENCE OF OTHER SPECIFIED PARTS OF DIGESTIVE TRACT: ICD-10-CM

## 2021-07-17 DIAGNOSIS — E11.22 TYPE 2 DIABETES MELLITUS WITH DIABETIC CHRONIC KIDNEY DISEASE: ICD-10-CM

## 2021-07-17 DIAGNOSIS — I12.9 HYPERTENSIVE CHRONIC KIDNEY DISEASE WITH STAGE 1 THROUGH STAGE 4 CHRONIC KIDNEY DISEASE, OR UNSPECIFIED CHRONIC KIDNEY DISEASE: ICD-10-CM

## 2021-07-17 DIAGNOSIS — R20.2 PARESTHESIA OF SKIN: ICD-10-CM

## 2021-07-17 DIAGNOSIS — R20.0 ANESTHESIA OF SKIN: ICD-10-CM

## 2021-07-17 DIAGNOSIS — Z90.49 ACQUIRED ABSENCE OF OTHER SPECIFIED PARTS OF DIGESTIVE TRACT: Chronic | ICD-10-CM

## 2021-07-17 DIAGNOSIS — N18.30 CHRONIC KIDNEY DISEASE, STAGE 3 UNSPECIFIED: ICD-10-CM

## 2021-07-17 DIAGNOSIS — E78.5 HYPERLIPIDEMIA, UNSPECIFIED: ICD-10-CM

## 2021-07-17 LAB
ALBUMIN SERPL ELPH-MCNC: 4.1 G/DL — SIGNIFICANT CHANGE UP (ref 3.3–5)
ALP SERPL-CCNC: 123 U/L — HIGH (ref 40–120)
ALT FLD-CCNC: 6 U/L — LOW (ref 10–45)
ANION GAP SERPL CALC-SCNC: 10 MMOL/L — SIGNIFICANT CHANGE UP (ref 5–17)
APTT BLD: 32.1 SEC — SIGNIFICANT CHANGE UP (ref 27.5–35.5)
AST SERPL-CCNC: 13 U/L — SIGNIFICANT CHANGE UP (ref 10–40)
BASOPHILS # BLD AUTO: 0.07 K/UL — SIGNIFICANT CHANGE UP (ref 0–0.2)
BASOPHILS NFR BLD AUTO: 1 % — SIGNIFICANT CHANGE UP (ref 0–2)
BILIRUB SERPL-MCNC: 0.3 MG/DL — SIGNIFICANT CHANGE UP (ref 0.2–1.2)
BUN SERPL-MCNC: 30 MG/DL — HIGH (ref 7–23)
CALCIUM SERPL-MCNC: 9.2 MG/DL — SIGNIFICANT CHANGE UP (ref 8.4–10.5)
CHLORIDE SERPL-SCNC: 106 MMOL/L — SIGNIFICANT CHANGE UP (ref 96–108)
CK MB CFR SERPL CALC: 5.4 NG/ML — SIGNIFICANT CHANGE UP (ref 0–6.7)
CK SERPL-CCNC: 298 U/L — HIGH (ref 30–200)
CO2 SERPL-SCNC: 28 MMOL/L — SIGNIFICANT CHANGE UP (ref 22–31)
CREAT SERPL-MCNC: 2.94 MG/DL — HIGH (ref 0.5–1.3)
EOSINOPHIL # BLD AUTO: 0.2 K/UL — SIGNIFICANT CHANGE UP (ref 0–0.5)
EOSINOPHIL NFR BLD AUTO: 2.8 % — SIGNIFICANT CHANGE UP (ref 0–6)
GLUCOSE SERPL-MCNC: 156 MG/DL — HIGH (ref 70–99)
HCT VFR BLD CALC: 34.6 % — LOW (ref 39–50)
HGB BLD-MCNC: 11.5 G/DL — LOW (ref 13–17)
IMM GRANULOCYTES NFR BLD AUTO: 0.4 % — SIGNIFICANT CHANGE UP (ref 0–1.5)
INR BLD: 1.06 — SIGNIFICANT CHANGE UP (ref 0.88–1.16)
LYMPHOCYTES # BLD AUTO: 1.66 K/UL — SIGNIFICANT CHANGE UP (ref 1–3.3)
LYMPHOCYTES # BLD AUTO: 23.6 % — SIGNIFICANT CHANGE UP (ref 13–44)
MCHC RBC-ENTMCNC: 30.7 PG — SIGNIFICANT CHANGE UP (ref 27–34)
MCHC RBC-ENTMCNC: 33.2 GM/DL — SIGNIFICANT CHANGE UP (ref 32–36)
MCV RBC AUTO: 92.5 FL — SIGNIFICANT CHANGE UP (ref 80–100)
MONOCYTES # BLD AUTO: 0.71 K/UL — SIGNIFICANT CHANGE UP (ref 0–0.9)
MONOCYTES NFR BLD AUTO: 10.1 % — SIGNIFICANT CHANGE UP (ref 2–14)
NEUTROPHILS # BLD AUTO: 4.37 K/UL — SIGNIFICANT CHANGE UP (ref 1.8–7.4)
NEUTROPHILS NFR BLD AUTO: 62.1 % — SIGNIFICANT CHANGE UP (ref 43–77)
NRBC # BLD: 0 /100 WBCS — SIGNIFICANT CHANGE UP (ref 0–0)
PLATELET # BLD AUTO: 218 K/UL — SIGNIFICANT CHANGE UP (ref 150–400)
POTASSIUM SERPL-MCNC: 3.9 MMOL/L — SIGNIFICANT CHANGE UP (ref 3.5–5.3)
POTASSIUM SERPL-SCNC: 3.9 MMOL/L — SIGNIFICANT CHANGE UP (ref 3.5–5.3)
PROT SERPL-MCNC: 7 G/DL — SIGNIFICANT CHANGE UP (ref 6–8.3)
PROTHROM AB SERPL-ACNC: 12.7 SEC — SIGNIFICANT CHANGE UP (ref 10.6–13.6)
RBC # BLD: 3.74 M/UL — LOW (ref 4.2–5.8)
RBC # FLD: 13 % — SIGNIFICANT CHANGE UP (ref 10.3–14.5)
SODIUM SERPL-SCNC: 144 MMOL/L — SIGNIFICANT CHANGE UP (ref 135–145)
TROPONIN T SERPL-MCNC: 0.08 NG/ML — CRITICAL HIGH (ref 0–0.01)
TROPONIN T SERPL-MCNC: 0.09 NG/ML — CRITICAL HIGH (ref 0–0.01)
WBC # BLD: 7.04 K/UL — SIGNIFICANT CHANGE UP (ref 3.8–10.5)
WBC # FLD AUTO: 7.04 K/UL — SIGNIFICANT CHANGE UP (ref 3.8–10.5)

## 2021-07-17 PROCEDURE — 80053 COMPREHEN METABOLIC PANEL: CPT

## 2021-07-17 PROCEDURE — 85610 PROTHROMBIN TIME: CPT

## 2021-07-17 PROCEDURE — 84484 ASSAY OF TROPONIN QUANT: CPT

## 2021-07-17 PROCEDURE — 93010 ELECTROCARDIOGRAM REPORT: CPT

## 2021-07-17 PROCEDURE — 99283 EMERGENCY DEPT VISIT LOW MDM: CPT

## 2021-07-17 PROCEDURE — 93005 ELECTROCARDIOGRAM TRACING: CPT | Mod: 76

## 2021-07-17 PROCEDURE — 99285 EMERGENCY DEPT VISIT HI MDM: CPT | Mod: 25

## 2021-07-17 PROCEDURE — 85025 COMPLETE CBC W/AUTO DIFF WBC: CPT

## 2021-07-17 PROCEDURE — 82550 ASSAY OF CK (CPK): CPT

## 2021-07-17 PROCEDURE — 36415 COLL VENOUS BLD VENIPUNCTURE: CPT

## 2021-07-17 PROCEDURE — 82962 GLUCOSE BLOOD TEST: CPT

## 2021-07-17 PROCEDURE — 82553 CREATINE MB FRACTION: CPT

## 2021-07-17 PROCEDURE — 85730 THROMBOPLASTIN TIME PARTIAL: CPT

## 2021-07-17 NOTE — ED PROVIDER NOTE - PHYSICAL EXAMINATION
CONSTITUTIONAL: Well-appearing; in no apparent distress.   HEAD: Normocephalic; atraumatic.   EYES:  conjunctiva and sclera clear  ENT: normal nose; no rhinorrhea; normal pharynx with no erythema or lesions.   NECK: Supple; non-tender;   CARDIOVASCULAR: Normal S1, S2; no murmurs, rubs, or gallops. Regular rate and rhythm.   RESPIRATORY: Breathing easily; breath sounds clear and equal bilaterally; no wheezes, rhonchi, or rales.  GI: Soft; non-distended; non-tender; no palpable organomegaly.   EXT: No cyanosis or edema; N/V intact  SKIN: Normal for age and race; warm; dry; good turgor; no apparent lesions or rash.   NEURO: A & O x 3; face symmetric; grossly unremarkable.   PSYCHOLOGICAL: The patient’s mood and manner are appropriate.

## 2021-07-17 NOTE — ED PROVIDER NOTE - PATIENT PORTAL LINK FT
You can access the FollowMyHealth Patient Portal offered by [facility] by registering at the following website: [email or web address]. By joining [employer]’s FollowMyHealth portal, you will also be able to view your health information using other applications (apps) compatible with our system.

## 2021-07-17 NOTE — ED PROVIDER NOTE - CARE PROVIDERS DIRECT ADDRESSES
[email or web address]

## 2021-07-17 NOTE — ED ADULT NURSE NOTE - OBJECTIVE STATEMENT
pt received into spot 17 A&Ox3 ambulatory with steady gait arrives via walk in triage for eval of left sided numbness today starting around 11:15 AM was in his feet arm and lip and has had this issue for over a year now with multiple negative work ups. Symptoms have typically resolved within minutes in the past but today they persisted hours prompting his visit. Pt states as time passes symptoms continue to improve. Denies head ache blurry vision N/V. no facial droop

## 2021-07-17 NOTE — ED PROVIDER NOTE - NSFOLLOWUPINSTRUCTIONS_ED_ALL_ED_FT
Please follow up with a cardiologist to get a cardiac workup.     You can make an appointment with Dr. Stoner by calling [phone], or go to any cardiologist you prefer. Holly given two other options as well.     Chest Pain    Chest pain can be caused by many different conditions which may or may not be dangerous. Causes include heartburn, lung infections, heart attack, blood clot in lungs, skin infections, strain or damage to muscle, cartilage, or bones, etc. In addition to a history and physical examination, an electrocardiogram (ECG) or other lab tests may have been performed to determine the cause of your chest pain. Follow up with your primary care provider or with a cardiologist as instructed.     SEEK IMMEDIATE MEDICAL CARE IF YOU HAVE ANY OF THE FOLLOWING SYMPTOMS: worsening chest pain, coughing up blood, unexplained back/neck/jaw pain, severe abdominal pain, dizziness or lightheadedness, fainting, shortness of breath, sweaty or clammy skin, vomiting, or racing heart beat. These symptoms may represent a serious problem that is an emergency. Do not wait to see if the symptoms will go away. Get medical help right away. Call 911 and do not drive yourself to the hospital.

## 2021-07-17 NOTE — ED PROVIDER NOTE - OBJECTIVE STATEMENT
51 y/o M, nonsmoker, no drug use, no alcohol use, fully COVID-19 vaccinated (greater than 2 weeks ago) w/ Hx of HTN, type 2 DM, HLD, renal dysfunction (followed by nephrologist Dr. Duff, PCP Dr. Foster) presents to ED w/ complaints of transient episode of left sided numbness that started in face and radiated to left arm and left leg, which happened around 11am and has now resolved. Pt reports symptoms have self-resolved. He describes symptoms as feeling as if arm and leg have "fallen asleep."  Pt states he has experienced symptoms intermittently for over a year, usually lasts minutes at a time, but lasted longer today about an hour so came in for eval. As outpatient has obtained carotid doppler and lower extremity doppler but workup has been normal for symptoms. Never had cardiac workup or evaluation.

## 2021-07-17 NOTE — ED ADULT TRIAGE NOTE - CHIEF COMPLAINT QUOTE
Pt reports tingling to left lower lip, left arm, left leg intermittent for "a couple years but it usually only lasts for a min or two but this time it's been longer." Pt reports tingling to left lower lip and left arm at this time starting around 11am, "my left leg was too but it's gotten better since." Pt denies headache, changes in vision, dizziness, n/v, speaking in full sentences and ambulating with steady gait.

## 2021-07-17 NOTE — ED ADULT NURSE NOTE - CHIEF COMPLAINT QUOTE
feels cold not chills Pt reports tingling to left lower lip, left arm, left leg intermittent for "a couple years but it usually only lasts for a min or two but this time it's been longer." Pt reports tingling to left lower lip and left arm at this time starting around 11am, "my left leg was too but it's gotten better since." Pt denies headache, changes in vision, dizziness, n/v, speaking in full sentences and ambulating with steady gait.

## 2021-07-17 NOTE — ED PROVIDER NOTE - CARE PROVIDER_API CALL
Mayank Brown  CARDIOVASCULAR DISEASE  [address] [place] [address]  Phone: [phone]  Fax: [phone]  Follow Up Time:     Latonya Cedeño  CARDIOLOGY  [address] [address]  Phone: [phone]  Fax: [phone]  Follow Up Time:

## 2021-07-17 NOTE — ED PROVIDER NOTE - CLINICAL SUMMARY MEDICAL DECISION MAKING FREE TEXT BOX
serial EKG stable and troponin neg. admission offered for cardiac workup but pt declined, prefers to follow up outpatient. DC home in NAD with strict return precautions given. pt to return to Ed if symptoms occur prior to cards appt

## 2021-07-19 ENCOUNTER — INPATIENT (INPATIENT)
Facility: HOSPITAL | Age: 51
LOS: 0 days | Discharge: HOME | DRG: 93 | End: 2021-07-20
Attending: PSYCHIATRY & NEUROLOGY | Admitting: PSYCHIATRY & NEUROLOGY
Payer: COMMERCIAL

## 2021-07-19 VITALS
HEIGHT: 69 IN | SYSTOLIC BLOOD PRESSURE: 167 MMHG | OXYGEN SATURATION: 97 % | DIASTOLIC BLOOD PRESSURE: 88 MMHG | RESPIRATION RATE: 18 BRPM | TEMPERATURE: 99 F | WEIGHT: 205.03 LBS | HEART RATE: 83 BPM

## 2021-07-19 DIAGNOSIS — Z90.49 ACQUIRED ABSENCE OF OTHER SPECIFIED PARTS OF DIGESTIVE TRACT: Chronic | ICD-10-CM

## 2021-07-19 LAB
A1C WITH ESTIMATED AVERAGE GLUCOSE RESULT: 7.1 % — HIGH (ref 4–5.6)
ALBUMIN SERPL ELPH-MCNC: 4 G/DL — SIGNIFICANT CHANGE UP (ref 3.3–5)
ALP SERPL-CCNC: 136 U/L — HIGH (ref 40–120)
ALT FLD-CCNC: 9 U/L — LOW (ref 10–45)
ANION GAP SERPL CALC-SCNC: 9 MMOL/L — SIGNIFICANT CHANGE UP (ref 5–17)
APPEARANCE UR: CLEAR — SIGNIFICANT CHANGE UP
APTT BLD: 32.2 SEC — SIGNIFICANT CHANGE UP (ref 27.5–35.5)
AST SERPL-CCNC: 14 U/L — SIGNIFICANT CHANGE UP (ref 10–40)
BACTERIA # UR AUTO: SIGNIFICANT CHANGE UP /HPF
BASOPHILS # BLD AUTO: 0.04 K/UL — SIGNIFICANT CHANGE UP (ref 0–0.2)
BASOPHILS NFR BLD AUTO: 0.6 % — SIGNIFICANT CHANGE UP (ref 0–2)
BILIRUB SERPL-MCNC: 0.4 MG/DL — SIGNIFICANT CHANGE UP (ref 0.2–1.2)
BILIRUB UR-MCNC: NEGATIVE — SIGNIFICANT CHANGE UP
BUN SERPL-MCNC: 28 MG/DL — HIGH (ref 7–23)
CALCIUM SERPL-MCNC: 9.3 MG/DL — SIGNIFICANT CHANGE UP (ref 8.4–10.5)
CHLORIDE SERPL-SCNC: 107 MMOL/L — SIGNIFICANT CHANGE UP (ref 96–108)
CHOLEST SERPL-MCNC: 136 MG/DL — SIGNIFICANT CHANGE UP
CO2 SERPL-SCNC: 29 MMOL/L — SIGNIFICANT CHANGE UP (ref 22–31)
COLOR SPEC: YELLOW — SIGNIFICANT CHANGE UP
COMMENT - URINE: SIGNIFICANT CHANGE UP
CREAT SERPL-MCNC: 3.01 MG/DL — HIGH (ref 0.5–1.3)
DIFF PNL FLD: NEGATIVE — SIGNIFICANT CHANGE UP
EOSINOPHIL # BLD AUTO: 0.16 K/UL — SIGNIFICANT CHANGE UP (ref 0–0.5)
EOSINOPHIL NFR BLD AUTO: 2.5 % — SIGNIFICANT CHANGE UP (ref 0–6)
EPI CELLS # UR: SIGNIFICANT CHANGE UP /HPF (ref 0–5)
ESTIMATED AVERAGE GLUCOSE: 157 MG/DL — HIGH (ref 68–114)
GLUCOSE BLDC GLUCOMTR-MCNC: 122 MG/DL — HIGH (ref 70–99)
GLUCOSE BLDC GLUCOMTR-MCNC: 178 MG/DL — HIGH (ref 70–99)
GLUCOSE SERPL-MCNC: 167 MG/DL — HIGH (ref 70–99)
GLUCOSE UR QL: NEGATIVE — SIGNIFICANT CHANGE UP
HCT VFR BLD CALC: 36.6 % — LOW (ref 39–50)
HDLC SERPL-MCNC: 30 MG/DL — LOW
HGB BLD-MCNC: 12.2 G/DL — LOW (ref 13–17)
IMM GRANULOCYTES NFR BLD AUTO: 0.3 % — SIGNIFICANT CHANGE UP (ref 0–1.5)
INR BLD: 1.05 — SIGNIFICANT CHANGE UP (ref 0.88–1.16)
KETONES UR-MCNC: NEGATIVE — SIGNIFICANT CHANGE UP
LEUKOCYTE ESTERASE UR-ACNC: NEGATIVE — SIGNIFICANT CHANGE UP
LIPID PNL WITH DIRECT LDL SERPL: 77 MG/DL — SIGNIFICANT CHANGE UP
LYMPHOCYTES # BLD AUTO: 1.62 K/UL — SIGNIFICANT CHANGE UP (ref 1–3.3)
LYMPHOCYTES # BLD AUTO: 25.3 % — SIGNIFICANT CHANGE UP (ref 13–44)
MCHC RBC-ENTMCNC: 30.3 PG — SIGNIFICANT CHANGE UP (ref 27–34)
MCHC RBC-ENTMCNC: 33.3 GM/DL — SIGNIFICANT CHANGE UP (ref 32–36)
MCV RBC AUTO: 91 FL — SIGNIFICANT CHANGE UP (ref 80–100)
MONOCYTES # BLD AUTO: 0.63 K/UL — SIGNIFICANT CHANGE UP (ref 0–0.9)
MONOCYTES NFR BLD AUTO: 9.8 % — SIGNIFICANT CHANGE UP (ref 2–14)
NEUTROPHILS # BLD AUTO: 3.93 K/UL — SIGNIFICANT CHANGE UP (ref 1.8–7.4)
NEUTROPHILS NFR BLD AUTO: 61.5 % — SIGNIFICANT CHANGE UP (ref 43–77)
NITRITE UR-MCNC: NEGATIVE — SIGNIFICANT CHANGE UP
NON HDL CHOLESTEROL: 106 MG/DL — SIGNIFICANT CHANGE UP
NRBC # BLD: 0 /100 WBCS — SIGNIFICANT CHANGE UP (ref 0–0)
PH UR: 6 — SIGNIFICANT CHANGE UP (ref 5–8)
PLATELET # BLD AUTO: 240 K/UL — SIGNIFICANT CHANGE UP (ref 150–400)
POTASSIUM SERPL-MCNC: 4.1 MMOL/L — SIGNIFICANT CHANGE UP (ref 3.5–5.3)
POTASSIUM SERPL-SCNC: 4.1 MMOL/L — SIGNIFICANT CHANGE UP (ref 3.5–5.3)
PROT SERPL-MCNC: 7.2 G/DL — SIGNIFICANT CHANGE UP (ref 6–8.3)
PROT UR-MCNC: 100 MG/DL
PROTHROM AB SERPL-ACNC: 12.6 SEC — SIGNIFICANT CHANGE UP (ref 10.6–13.6)
RBC # BLD: 4.02 M/UL — LOW (ref 4.2–5.8)
RBC # FLD: 12.9 % — SIGNIFICANT CHANGE UP (ref 10.3–14.5)
RBC CASTS # UR COMP ASSIST: < 5 /HPF — SIGNIFICANT CHANGE UP
SARS-COV-2 RNA SPEC QL NAA+PROBE: NEGATIVE — SIGNIFICANT CHANGE UP
SODIUM SERPL-SCNC: 145 MMOL/L — SIGNIFICANT CHANGE UP (ref 135–145)
SP GR SPEC: 1.02 — SIGNIFICANT CHANGE UP (ref 1–1.03)
TRIGL SERPL-MCNC: 143 MG/DL — SIGNIFICANT CHANGE UP
TROPONIN T SERPL-MCNC: 0.09 NG/ML — CRITICAL HIGH (ref 0–0.01)
TSH SERPL-MCNC: 0.98 UIU/ML — SIGNIFICANT CHANGE UP (ref 0.27–4.2)
UROBILINOGEN FLD QL: 0.2 E.U./DL — SIGNIFICANT CHANGE UP
WBC # BLD: 6.4 K/UL — SIGNIFICANT CHANGE UP (ref 3.8–10.5)
WBC # FLD AUTO: 6.4 K/UL — SIGNIFICANT CHANGE UP (ref 3.8–10.5)
WBC UR QL: < 5 /HPF — SIGNIFICANT CHANGE UP

## 2021-07-19 PROCEDURE — 70547 MR ANGIOGRAPHY NECK W/O DYE: CPT | Mod: 26

## 2021-07-19 PROCEDURE — 99291 CRITICAL CARE FIRST HOUR: CPT

## 2021-07-19 PROCEDURE — 70450 CT HEAD/BRAIN W/O DYE: CPT | Mod: 26,MA

## 2021-07-19 PROCEDURE — 71045 X-RAY EXAM CHEST 1 VIEW: CPT | Mod: 26

## 2021-07-19 PROCEDURE — 72141 MRI NECK SPINE W/O DYE: CPT | Mod: 26

## 2021-07-19 PROCEDURE — 70544 MR ANGIOGRAPHY HEAD W/O DYE: CPT | Mod: 26

## 2021-07-19 PROCEDURE — 93010 ELECTROCARDIOGRAM REPORT: CPT

## 2021-07-19 PROCEDURE — 99223 1ST HOSP IP/OBS HIGH 75: CPT

## 2021-07-19 RX ORDER — DEXTROSE 50 % IN WATER 50 %
25 SYRINGE (ML) INTRAVENOUS ONCE
Refills: 0 | Status: DISCONTINUED | OUTPATIENT
Start: 2021-07-19 | End: 2021-07-20

## 2021-07-19 RX ORDER — SODIUM CHLORIDE 9 MG/ML
1000 INJECTION, SOLUTION INTRAVENOUS
Refills: 0 | Status: DISCONTINUED | OUTPATIENT
Start: 2021-07-19 | End: 2021-07-20

## 2021-07-19 RX ORDER — HYDRALAZINE HCL 50 MG
100 TABLET ORAL
Qty: 0 | Refills: 3 | DISCHARGE

## 2021-07-19 RX ORDER — CARVEDILOL PHOSPHATE 80 MG/1
25 CAPSULE, EXTENDED RELEASE ORAL
Qty: 0 | Refills: 3 | DISCHARGE

## 2021-07-19 RX ORDER — INSULIN LISPRO 100/ML
VIAL (ML) SUBCUTANEOUS AT BEDTIME
Refills: 0 | Status: DISCONTINUED | OUTPATIENT
Start: 2021-07-19 | End: 2021-07-20

## 2021-07-19 RX ORDER — CLOPIDOGREL BISULFATE 75 MG/1
75 TABLET, FILM COATED ORAL DAILY
Refills: 0 | Status: DISCONTINUED | OUTPATIENT
Start: 2021-07-20 | End: 2021-07-20

## 2021-07-19 RX ORDER — INSULIN LISPRO 100/ML
VIAL (ML) SUBCUTANEOUS
Refills: 0 | Status: DISCONTINUED | OUTPATIENT
Start: 2021-07-19 | End: 2021-07-20

## 2021-07-19 RX ORDER — ATORVASTATIN CALCIUM 80 MG/1
40 TABLET, FILM COATED ORAL AT BEDTIME
Refills: 0 | Status: DISCONTINUED | OUTPATIENT
Start: 2021-07-19 | End: 2021-07-20

## 2021-07-19 RX ORDER — DEXTROSE 50 % IN WATER 50 %
15 SYRINGE (ML) INTRAVENOUS ONCE
Refills: 0 | Status: DISCONTINUED | OUTPATIENT
Start: 2021-07-19 | End: 2021-07-20

## 2021-07-19 RX ORDER — HYDRALAZINE HCL 50 MG
0 TABLET ORAL
Qty: 0 | Refills: 3 | DISCHARGE

## 2021-07-19 RX ORDER — CARVEDILOL PHOSPHATE 80 MG/1
25 CAPSULE, EXTENDED RELEASE ORAL EVERY 12 HOURS
Refills: 0 | Status: DISCONTINUED | OUTPATIENT
Start: 2021-07-19 | End: 2021-07-20

## 2021-07-19 RX ORDER — CLOPIDOGREL BISULFATE 75 MG/1
150 TABLET, FILM COATED ORAL ONCE
Refills: 0 | Status: COMPLETED | OUTPATIENT
Start: 2021-07-19 | End: 2021-07-19

## 2021-07-19 RX ORDER — GLUCAGON INJECTION, SOLUTION 0.5 MG/.1ML
1 INJECTION, SOLUTION SUBCUTANEOUS ONCE
Refills: 0 | Status: DISCONTINUED | OUTPATIENT
Start: 2021-07-19 | End: 2021-07-20

## 2021-07-19 RX ORDER — HYDRALAZINE HCL 50 MG
100 TABLET ORAL THREE TIMES A DAY
Refills: 0 | Status: DISCONTINUED | OUTPATIENT
Start: 2021-07-19 | End: 2021-07-20

## 2021-07-19 RX ORDER — DEXTROSE 50 % IN WATER 50 %
12.5 SYRINGE (ML) INTRAVENOUS ONCE
Refills: 0 | Status: DISCONTINUED | OUTPATIENT
Start: 2021-07-19 | End: 2021-07-20

## 2021-07-19 RX ORDER — ASPIRIN/CALCIUM CARB/MAGNESIUM 324 MG
81 TABLET ORAL DAILY
Refills: 0 | Status: DISCONTINUED | OUTPATIENT
Start: 2021-07-19 | End: 2021-07-20

## 2021-07-19 RX ORDER — AMLODIPINE BESYLATE 2.5 MG/1
5 TABLET ORAL
Qty: 0 | Refills: 0 | DISCHARGE

## 2021-07-19 RX ORDER — AMLODIPINE BESYLATE 2.5 MG/1
0 TABLET ORAL
Qty: 0 | Refills: 0 | DISCHARGE

## 2021-07-19 RX ORDER — ISOSORBIDE MONONITRATE 60 MG/1
0 TABLET, EXTENDED RELEASE ORAL
Qty: 0 | Refills: 3 | DISCHARGE

## 2021-07-19 RX ORDER — ATORVASTATIN CALCIUM 80 MG/1
0 TABLET, FILM COATED ORAL
Qty: 0 | Refills: 3 | DISCHARGE

## 2021-07-19 RX ORDER — CARVEDILOL PHOSPHATE 80 MG/1
0 CAPSULE, EXTENDED RELEASE ORAL
Qty: 0 | Refills: 3 | DISCHARGE

## 2021-07-19 RX ORDER — HEPARIN SODIUM 5000 [USP'U]/ML
5000 INJECTION INTRAVENOUS; SUBCUTANEOUS EVERY 8 HOURS
Refills: 0 | Status: DISCONTINUED | OUTPATIENT
Start: 2021-07-19 | End: 2021-07-20

## 2021-07-19 RX ADMIN — CLOPIDOGREL BISULFATE 150 MILLIGRAM(S): 75 TABLET, FILM COATED ORAL at 13:36

## 2021-07-19 RX ADMIN — ATORVASTATIN CALCIUM 40 MILLIGRAM(S): 80 TABLET, FILM COATED ORAL at 21:47

## 2021-07-19 RX ADMIN — HEPARIN SODIUM 5000 UNIT(S): 5000 INJECTION INTRAVENOUS; SUBCUTANEOUS at 21:47

## 2021-07-19 RX ADMIN — Medication 81 MILLIGRAM(S): at 14:10

## 2021-07-19 RX ADMIN — HEPARIN SODIUM 5000 UNIT(S): 5000 INJECTION INTRAVENOUS; SUBCUTANEOUS at 15:16

## 2021-07-19 RX ADMIN — CARVEDILOL PHOSPHATE 25 MILLIGRAM(S): 80 CAPSULE, EXTENDED RELEASE ORAL at 18:48

## 2021-07-19 RX ADMIN — Medication 100 MILLIGRAM(S): at 19:02

## 2021-07-19 NOTE — H&P ADULT - ASSESSMENT
51 y/o male with PMHx of HTN, Hyperlipidemia, CKD, NSTEMI, and DM-2 present to [facility] ER this afternoon, 7/19/21, w/left-sided numbness that has been recurring for ~3y. Of note, was here 7/17/21 with same symptom set and ruled out for cardiac etiology through down-trending troponin. Denies acute onset of any additional numbness, weakness, tingling, slurred speech, blurry vision, double vision, dizziness, headache. Admitted to stroke/neurology for further workup with MRA head/neck and MR cervical spine w/o contrast.       Neuro  #CVA workup  -  aspirin 81mg and plavix 75mg daily; loaded w/150 Plavix in ER   - atorvastatin 40mg daily   - q4hr stroke neuro checks and vitals  - MRA Head/neck w/o contrast ordered  - MR Cervical spine ordered       Cards  #HTN  - permissive hypertension, Goal -180  - home Coreg dose ordered, all other BP agents held. Will add on additional home meds if SBP > goal   - Will re-trend troponin   - cardiac etiology ruled out in previous admission     #HLD  - high dose statin as above in CVA  - LDL results: 77 mg/dL     Pulm  - call provider if SPO2 < 94%    GI  #Nutrition/Fluids/Electrolytes   - replete K<4 and Mg <2  - appreciate renal insufficiency when repleting lytes   - Diet: Consistent Carb/Renal     Renal  - CKD stage 3b/4 based off eGFR range 27-30  - trend creatinine   - avoid nephrotoxins     Infectious Disease  - Monitor while inpatient for s/s of infection     Endocrine  #DM  - A1C results: 7.1   - ISS      DVT Prophylaxis  - heparin sq for DVT prophylaxis in setting of CKD   - SCDs for DVT prophylaxis         Discussed with Neurology Attending

## 2021-07-19 NOTE — ED ADULT TRIAGE NOTE - OTHER COMPLAINTS
patient presents with numbness to L side of lip radiating to LUE and LLE x 30 minutes, stroke code activated-- denies unilateral weakness patient presents with numbness to L side of lip radiating to LUE and LLE x 30 minutes-- denies unilateral weakness

## 2021-07-19 NOTE — H&P ADULT - NSHPREVIEWOFSYSTEMS_GEN_ALL_CORE
ROS:  Constitutional: No fever, weight loss or fatigue  Eyes: No eye pain, visual disturbances, or discharge  ENMT:  No difficulty hearing, tinnitus; No sinus or throat pain  Neck: No pain or stiffness  Respiratory: No cough, wheezing, chills or hemoptysis  Cardiovascular: No chest pain, palpitations, shortness of breath, or leg swelling  Gastrointestinal: No abdominal pain. No nausea, vomiting or hematemesis; No diarrhea or constipation.  Genitourinary: No dysuria, frequency, hematuria or incontinence  Neurological: As per HPI- numbness/pins + needles left side  Skin: No itching, burning, rashes or lesions   Endocrine: No heat or cold intolerance; No hair loss  Musculoskeletal: No joint pain or swelling; No muscle, back or extremity pain  Heme/Lymph: No easy bruising or bleeding gums

## 2021-07-19 NOTE — ED PROVIDER NOTE - WR ORDER STATUS 1
PHYSICIAN NEXT STEPS:  Call the Patient    CHIEF COMPLAINT:  Chief Complaint/Protocol Used: Back Pain  Onset: worse since Wednesday      ASSESSMENT:  ? Onset: worse since Wednesday  ? Normal True  ? Onset: has had problems with disc's for 20 years  ? Location: L3-S1  ? Severity: 6-7/10  ? Pattern: constant  ? Medications: advil and aleve do not help, norco helps for a couple of hours only  ? Other Symptoms: all movement hurts  -------------------------------------------------------    DISPOSITION:  Disposition Recommendation: Go to ED Now (or PCP triage)  Questions that led to disposition:  ? Patient sounds very sick or weak to the triager  Patient Directed To: Unspecified  Patient Intended Action: Go to Hospital / ED      CALL NOTES:  05/01/2021 at 5:57 PM by Hannah Barker  ? Message sent to Doctor per patieent request.    DISPOSITION OVERRIDE/PROVIDER CONSULT:  Disposition Override: N/A  Override Source: Unspecified  Consulted with PCP: No  Consulted with On-Call Physician: No    CALLER CONTACT INFO:  Name: Maru Oden (Self)  Phone 1: [phone] (Mobile)  Phone 2: [phone] (Home Phone)  Phone 3: [phone] (Work Phone)      ENCOUNTER STARTED:  05/01/21 05:45:11 PM  ENCOUNTER ASSIGNED TO/CLOSED BY:  Hannah Barker @ 05/01/21 05:57:49 PM      -------------------------------------------------------    CARE ADVICE given per Back Pain guideline.  GO TO ED NOW (OR PCP TRIAGE):   * IF NO PCP TRIAGE: You need to be seen. Go to the ER/C at _____________ Hospital within the next hour. Leave as soon as you can.   * IF PCP TRIAGE REQUIRED: You may need to be seen. Your doctor will want to talk with you to decide what's best. I'll page him now. If you haven't heard from the on-call doctor within 30 minutes, go directly to the ER/UCC at _____________ Hospital.;   DRIVING: Another adult should drive.      UNDERSTANDS CARE ADVICE: Yes    AGREES WITH CARE ADVICE: No    WILL FOLLOW CARE ADVICE:  No    -------------------------------------------------------   Resulted

## 2021-07-19 NOTE — H&P ADULT - NSICDXPASTMEDICALHX_GEN_ALL_CORE_FT
PAST MEDICAL HISTORY:  CKD (chronic kidney disease), stage III     Diabetes     Hyperlipidemia     Hypertension     NSTEMI (non-ST elevation myocardial infarction)

## 2021-07-19 NOTE — H&P ADULT - ATTENDING COMMENTS
Ryan Abebeleimartín: 50 yr h/o HTN, DM, CKD-Cr-3.0, MI, intermittent transient episodes of left lower lip/LUE/LLE paresthesias for 3 yrs; presented 7/19 with an another episode after his interview for a promotion, states it is lasting longer than usual which prompted him to come to hospital->but now it is resolving slowly. Denies headache/migraines. CTH-normal.   Evaluate for hypertensive urgency vs. anxiety vs. other   –takes aspirin daily, add Plavix, check MRA h/n without contrast(due to h/o CDK), MRI c-spine

## 2021-07-19 NOTE — ED PROVIDER NOTE - CLINICAL SUMMARY MEDICAL DECISION MAKING FREE TEXT BOX
49 y/o M w/ Hx of HTN, type 2 DM, renal dysfunction presents to ED w/ complaints of transient episode of left sided numbness that started in face and radiated to left arm and leg, 45 mins prior to arrival at ED, which has since self-resolved. Denies CP or SOB. Pt was seen in [facility] ED for CP and left sided numbness and DC'ed home 2 days ago. Stroke code called upon arrival at ED. CT head done and no acute changes found. CTA head and neck not done because pt has known renal disease and last creatinine at 2.9 two days ago. Vital signs noted. Pt afebrile w/ /88. Case discussed with stroke team in ED and admit to  stroke team for MRI. 51 y/o M w/ Hx of HTN, type 2 DM, renal dysfunction presents to ED w/ complaints of transient episode of left sided numbness that started in face and radiated to left arm and leg, 45 mins prior to arrival at ED, which has since self-resolved. Denies CP or SOB. Pt was seen in [facility] ED for CP and left sided numbness and DC'ed home 2 days ago. Stroke code called upon arrival at ED. CT head done and no acute changes found. CTA head and neck not done because pt has known renal disease and last creatinine at 2.9 two days ago. Vital signs noted. Pt afebrile w/ /88. Labs/ studies noted. ECG with NAD. CXR with NAD. Case discussed with stroke team and pt admitted to  stroke team for MRI. Stable in ED.

## 2021-07-19 NOTE — ED PROVIDER NOTE - CONSTITUTIONAL, MLM
normal... Mildly overweight. Well appearing, awake, alert, oriented to person, place, time/situation and in no apparent distress. Mildly overweight. Well appearing, awake, alert, oriented and in no apparent distress.

## 2021-07-19 NOTE — H&P ADULT - NSHPPHYSICALEXAM_GEN_ALL_CORE
Physical exam:  Constitutional: No acute distress, conversant  Eyes: Anicteric sclerae, moist conjunctivae, see below for CNs  Neck: trachea midline, FROM  Cardiovascular: Regular rate and rhythm  Pulmonary: Anterior breath sounds clear bilaterally. No use of accessory muscles  GI: Abdomen soft, non-distended, non-tender  Extremities: Radial and DP pulses +2, no edema    Neurologic:  -Mental status: Awake, alert, oriented to person, place, and time. Speech is fluent with intact naming, repetition, and comprehension, no dysarthria. Recent and remote memory intact. Follows commands. Attention/concentration intact. Fund of knowledge appropriate.  -Cranial nerves:   II: Visual fields are full to confrontation.  III, IV, VI: Extraocular movements are intact without nystagmus. Pupils equally round and reactive to light  V:  Facial sensation V1-V3 equal and intact   VII: Face is symmetric with normal eye closure and smile  VIII: Hearing is bilaterally intact to finger rub  IX, X: Uvula is midline and soft palate rises symmetrically  XI: Head turning and shoulder shrug are intact.  XII: Tongue protrudes midline  Motor: Normal bulk and tone. No pronator drift. Strength bilateral upper extremity 5/5, bilateral lower extremities 5/5.  Sensation: Intact to light touch bilaterally. No neglect or extinction on double simultaneous testing. Pins and needles/Numbness without sensation loss over left side   Coordination: No dysmetria on finger-to-nose and heel-to-shin bilaterally  Gait: Narrow gait and steady    NIHSS: 0   ASPECT Score: 10

## 2021-07-19 NOTE — H&P ADULT - HISTORY OF PRESENT ILLNESS
51 y/o male with PMHx of HTN, Hyperlipidemia, CKD, NSTEMI, and DM-2 present to [facility] ER this afternoon, 21, complaining of left-sided numbness, described as "pins and needles" that begins at his left bottom lip and travels down his entire left side. The episode lasted for ~45 minutes and was not associated with any weakness, dizziness, headache, sensory loss, nausea, vomiting, or palpitations. Patient endorses, however, that these episodes have been occurring periodically for 3 years and he has never received a formal diagnosis regarding their continued recurrence. Neurological exam and CT stroke protocol are unremarkable, but in the setting of recurrent unresolved episodes spanning several years, given 150 mg Plavix in ED, & patient to be admitted to stroke/neurology service for MRA head/neck and MR cervical spine.    **STROKE CODE CONSULT NOTE**    Last known well time/Time of onset of symptoms:    HPI: 50y Male with PMHx of     T(C): 36.7 (21 @ 14:20), Max: 37.1 (21 @ 12:12)  HR: 75 (21 @ 14:20) (75 - 83)  BP: 163/89 (21 @ 14:20) (163/89 - 167/88)  RR: 16 (21 @ 14:20) (16 - 18)  SpO2: 100% (21 @ 14:20) (97% - 100%)    PAST MEDICAL & SURGICAL HISTORY:  Hypertension    Diabetes    Hyperlipidemia    CKD (chronic kidney disease), stage III    History of cholecystectomy        FAMILY HISTORY:  No pertinent family history in first degree relatives        SOCIAL HISTORY:   Patient lives with *** at ***.   Smoking status:  Drinking:  Drug Use:         MEDICATIONS  (STANDING):  aspirin enteric coated 81 milliGRAM(s) Oral daily  atorvastatin 40 milliGRAM(s) Oral at bedtime  heparin   Injectable 5000 Unit(s) SubCutaneous every 8 hours    MEDICATIONS  (PRN):    Allergies    No Known Allergies    Intolerances      Vital Signs Last 24 Hrs  T(C): 36.7 (2021 14:20), Max: 37.1 (2021 12:12)  T(F): 98 (2021 14:20), Max: 98.8 (2021 12:12)  HR: 75 (2021 14:20) (75 - 83)  BP: 163/89 (2021 14:20) (163/89 - 167/88)  BP(mean): --  RR: 16 (2021 14:20) (16 - 18)  SpO2: 100% (2021 14:20) (97% - 100%)    Physical exam:  Constitutional: No acute distress, conversant  Eyes: Anicteric sclerae, moist conjunctivae, see below for CNs  Neck: trachea midline, FROM, supple, no thyromegaly or lymphadenopathy  Cardiovascular: Regular rate and rhythm, no murmurs, rubs, or gallops. No carotid bruits.   Pulmonary: Anterior breath sounds clear bilaterally, no crackles or wheezing. No use of accessory muscles  GI: Abdomen soft, non-distended, non-tender  Extremities: Radial and DP pulses +2, no edema    Neurologic:  -Mental status: Awake, alert, oriented to person, place, and time. Speech is fluent with intact naming, repetition, and comprehension, no dysarthria. Recent and remote memory intact. Follows commands. Attention/concentration intact. Fund of knowledge appropriate.  -Cranial nerves:   II: Visual fields are full to confrontation.  III, IV, VI: Extraocular movements are intact without nystagmus. Pupils equally round and reactive to light  V:  Facial sensation V1-V3 equal and intact   VII: Face is symmetric with normal eye closure and smile  VIII: Hearing is bilaterally intact to finger rub  IX, X: Uvula is midline and soft palate rises symmetrically  XI: Head turning and shoulder shrug are intact.  XII: Tongue protrudes midline  Motor: Normal bulk and tone. No pronator drift. Strength bilateral upper extremity 5/5, bilateral lower extremities 5/5.  Rapid alternating movements intact and symmetric  Sensation: Intact to light touch bilaterally. No neglect or extinction on double simultaneous testing.  Coordination: No dysmetria on finger-to-nose and heel-to-shin bilaterally  Reflexes: Downgoing toes bilaterally   Gait: Narrow gait and steady    NIHSS: **** ASPECT Score: ***** ICH Score: ****** (GCS)    Fingerstick Blood Glucose: CAPILLARY BLOOD GLUCOSE      POCT Blood Glucose.: 147 mg/dL (2021 12:13)    LABS:                        12.2   6.40  )-----------( 240      ( 2021 12:37 )             36.6     07-19    145  |  107  |  28<H>  ----------------------------<  167<H>  4.1   |  29  |  3.01<H>    Ca    9.3      2021 12:37    TPro  7.2  /  Alb  4.0  /  TBili  0.4  /  DBili  x   /  AST  14  /  ALT  9<L>  /  AlkPhos  136<H>  07-19    PT/INR - ( 2021 12:37 )   PT: 12.6 sec;   INR: 1.05          PTT - ( 2021 12:37 )  PTT:32.2 sec  CARDIAC MARKERS ( 2021 12:37 )  x     / 0.09 ng/mL / x     / x     / x      CARDIAC MARKERS ( 2021 16:15 )  x     / 0.08 ng/mL / 298 U/L / x     / 5.4 ng/mL      Urinalysis Basic - ( 2021 14:27 )    Color: Yellow / Appearance: Clear / S.025 / pH: x  Gluc: x / Ketone: NEGATIVE  / Bili: Negative / Urobili: 0.2 E.U./dL   Blood: x / Protein: 100 mg/dL / Nitrite: NEGATIVE   Leuk Esterase: NEGATIVE / RBC: < 5 /HPF / WBC < 5 /HPF   Sq Epi: x / Non Sq Epi: 0-5 /HPF / Bacteria: None /HPF        RADIOLOGY & ADDITIONAL STUDIES:      -----------------------------------------------------------------------------------------------------------------  IV-tPA (Y/N):    ***                              Bolus time:    Alteplase Dose Verification w/ RN:  Reason IV-tPA not given: ***   51 y/o male with PMHx of HTN, Hyperlipidemia, CKD, NSTEMI, and DM-2 present to [facility] ER this afternoon, 7/19/21, complaining of left-sided numbness, described as "pins and needles" that begins at his left bottom lip and travels down his entire left side. The episode lasted for ~45 minutes and was not associated with any weakness, dizziness, headache, sensory loss, nausea, vomiting, or palpitations. Patient endorses, however, that these episodes have been occurring periodically for 3 years and he has never received a formal diagnosis regarding their continued recurrence. Of note, patient came to the ED two days ago with the same symptom presentation and was ruled out for cardiac etiology. Neurological exam and CTH stroke protocol are unremarkable, but in the setting of recurrent unresolved episodes spanning several years, given 150 mg Plavix in ED, & patient to be admitted to stroke/neurology service for MRA head/neck and MR cervical spine.

## 2021-07-19 NOTE — H&P ADULT - NSHPLABSRESULTS_GEN_ALL_CORE
Lipid Profile (07.19.21 @ 12:37)    Cholesterol, Serum: 136 mg/dL    Triglycerides, Serum: 143 mg/dL    HDL Cholesterol, Serum: 30 mg/dL    Non HDL Cholesterol: 106:    LDL Cholesterol Calculated: 77 mg/dL    POCT Blood Glucose.: 147 mg/dL (19 Jul 2021 12:13)                       12.2   6.40  )-----------( 240      ( 19 Jul 2021 12:37 )             36.6   07-19    145  |  107  |  28<H>  ----------------------------<  167<H>  4.1   |  29  |  3.01<H>    Ca    9.3      19 Jul 2021 12:37    TPro  7.2  /  Alb  4.0  /  TBili  0.4  /  DBili  x   /  AST  14  /  ALT  9<L>  /  AlkPhos  136<H>  07-19      < from: CT Brain Stroke Protocol (07.19.21 @ 12:33) >  Impression:    No acute intracranial hemorrhage, mass effect or demarcated territorial infarction..    < end of copied text >    < from: Xray Chest 1 View-PORTABLE IMMEDIATE (Xray Chest 1 View-PORTABLE IMMEDIATE .) (07.19.21 @ 12:53) >    impression: Heart sizewithin normal limits, thoracic aortic calcification. Lungs and mediastinum are unremarkable. Stable bony structures.    < end of copied text >      < from: 12 Lead ECG (07.17.21 @ 14:18) >    Diagnosis Line Normal sinus rhythm  Possible Left atrial enlargement  Nonspecific T wave abnormality  Prolonged QT    < end of copied text >

## 2021-07-19 NOTE — PATIENT PROFILE ADULT - FALLEN IN THE PAST
Health Maintenance Due   Topic Date Due    Hepatitis C Screening  1958    DTaP/Tdap/Td series (1 - Tdap) 08/27/1980    INFLUENZA AGE 9 TO ADULT  08/01/2017       Chief Complaint   Patient presents with   [facility] Follow Up     [facility]    Altered mental status    Other     hypokalemia       1. Have you been to the ER, urgent care clinic since your last visit? Hospitalized since your last visit? [address]- 5 days for hypokalemia and AMS    2. Have you seen or consulted any other health care providers outside of the [address] [place] [address] since your last visit? Include any pap smears or colon screening. Dr Estefania Rivera at [facility]    3) Do you have an Advance Directive on file? no    4) Are you interested in receiving information on Advance Directives? NO      Patient is accompanied by caseworkCyndsai Malhotra I have received verbal consent from Tai Willis to discuss any/all medical information while they are present in the room.
no
will allow to sober up and reassess   pt declines csw declines head ct  is cooperative in ed and now able to walk without assist and speaking clearly   alert and oriented to name place and time  will dc wally

## 2021-07-19 NOTE — H&P ADULT - NSHPSOCIALHISTORY_GEN_ALL_CORE
SOCIAL HISTORY:   Patient lives with wife and children   Smoking status: Denies   Drinking: Denies   Drug Use: Denies

## 2021-07-19 NOTE — ED PROVIDER NOTE - MUSCULOSKELETAL, MLM
Strength equal bilat. Spine appears normal, range of motion is not limited, no muscle or joint tenderness

## 2021-07-19 NOTE — ED PROVIDER NOTE - OBJECTIVE STATEMENT
51 y/o M, nonsmoker, no drug use, no alcohol use, fully COVID-19 vaccinated (greater than 2 weeks ago) w/ Hx of HTN, type 2 DM, HLD, renal dysfunction (followed by nephrologist Dr. Duff, PCP Dr. Foster) presents to ED w/ complaints of transient episode of left sided numbness that started in face and radiated to left arm and left leg, which happened 45 mins prior to arrival at ED. Pt reports symptoms have self-resolved. He describes symptoms as feeling as if arm and leg have "fallen asleep." However, pt reports he was walking when symptoms occurred. Denies current CP or  SOB. Of note, pt was [facility] ED 2 days [facility] CP and [facility]. Pt states he has experienced symptoms for over a year, has obtained carotid doppler and lower extremity doppler but workup has been normal for symptoms. Today he presents to ED for further eval. 51 y/o M, nonsmoker, no drug use, no alcohol use, fully COVID-19 vaccinated (greater than 2 weeks ago) w/ Hx of HTN, type 2 DM, HLD, renal dysfunction (followed by nephrologist Dr. Duff, PCP Dr. Foster) presents to ED w/ complaints of transient episode of left sided numbness that started in face and radiated to left arm and left leg, which happened 45 mins prior to arrival at ED. Pt reports symptoms have self-resolved. He describes symptoms as feeling as if arm and leg have "fallen asleep." However, pt reports he was walking when symptoms occurred. Denies current CP or  SOB. Of note, pt was [facility] in [facility] ED 2 days ago for CP and left sided numbness[facility]. Pt states he has experienced symptoms for over a year, has obtained carotid doppler and lower extremity doppler but workup has been negative for acute findings. Today he presents to ED for further eval. Stroke code called on my evaluation of pt.

## 2021-07-19 NOTE — ED ADULT NURSE NOTE - OTHER COMPLAINTS
patient presents with numbness to L side of lip radiating to LUE and LLE x 30 minutes-- denies unilateral weakness

## 2021-07-19 NOTE — ED ADULT NURSE NOTE - OBJECTIVE STATEMENT
pt received into sppt 5 A&Ox3 ambulatory appears comfortable arrives via walk in triage fore tracy of onset of left sdied numbness/ tingling pins and needles sensation x 45 mins pta. Pt reports hx of similar for years that typically resolves quicker. Same issue on Saturday came to [facility] had negative cardiac work up and DC'ed to follow up. symptoms re occurred today and pt came back. no head ache blurry vision n/v no facial droop slurred speech cp sob cough runny nose sore throat fever chills. NIH score 0. Resp even and unalbored sating at 97% on RA nsr to ccm 18G to RAC labs drawn and sent pt in nad

## 2021-07-20 ENCOUNTER — TRANSCRIPTION ENCOUNTER (OUTPATIENT)
Age: 51
End: 2021-07-20

## 2021-07-20 VITALS — TEMPERATURE: 98 F

## 2021-07-20 LAB
ANION GAP SERPL CALC-SCNC: 12 MMOL/L — SIGNIFICANT CHANGE UP (ref 5–17)
BASOPHILS # BLD AUTO: 0.04 K/UL — SIGNIFICANT CHANGE UP (ref 0–0.2)
BASOPHILS NFR BLD AUTO: 0.6 % — SIGNIFICANT CHANGE UP (ref 0–2)
BUN SERPL-MCNC: 25 MG/DL — HIGH (ref 7–23)
CALCIUM SERPL-MCNC: 8.9 MG/DL — SIGNIFICANT CHANGE UP (ref 8.4–10.5)
CHLORIDE SERPL-SCNC: 108 MMOL/L — SIGNIFICANT CHANGE UP (ref 96–108)
CO2 SERPL-SCNC: 25 MMOL/L — SIGNIFICANT CHANGE UP (ref 22–31)
COVID-19 SPIKE DOMAIN AB INTERP: POSITIVE
COVID-19 SPIKE DOMAIN ANTIBODY RESULT: >250 U/ML — HIGH
CREAT SERPL-MCNC: 2.65 MG/DL — HIGH (ref 0.5–1.3)
EOSINOPHIL # BLD AUTO: 0.14 K/UL — SIGNIFICANT CHANGE UP (ref 0–0.5)
EOSINOPHIL NFR BLD AUTO: 2.2 % — SIGNIFICANT CHANGE UP (ref 0–6)
GLUCOSE BLDC GLUCOMTR-MCNC: 114 MG/DL — HIGH (ref 70–99)
GLUCOSE BLDC GLUCOMTR-MCNC: 121 MG/DL — HIGH (ref 70–99)
GLUCOSE SERPL-MCNC: 120 MG/DL — HIGH (ref 70–99)
HCT VFR BLD CALC: 35.6 % — LOW (ref 39–50)
HGB BLD-MCNC: 12.1 G/DL — LOW (ref 13–17)
IMM GRANULOCYTES NFR BLD AUTO: 0.3 % — SIGNIFICANT CHANGE UP (ref 0–1.5)
LYMPHOCYTES # BLD AUTO: 1.65 K/UL — SIGNIFICANT CHANGE UP (ref 1–3.3)
LYMPHOCYTES # BLD AUTO: 26.5 % — SIGNIFICANT CHANGE UP (ref 13–44)
MAGNESIUM SERPL-MCNC: 1.8 MG/DL — SIGNIFICANT CHANGE UP (ref 1.6–2.6)
MCHC RBC-ENTMCNC: 30.6 PG — SIGNIFICANT CHANGE UP (ref 27–34)
MCHC RBC-ENTMCNC: 34 GM/DL — SIGNIFICANT CHANGE UP (ref 32–36)
MCV RBC AUTO: 90.1 FL — SIGNIFICANT CHANGE UP (ref 80–100)
MONOCYTES # BLD AUTO: 0.72 K/UL — SIGNIFICANT CHANGE UP (ref 0–0.9)
MONOCYTES NFR BLD AUTO: 11.6 % — SIGNIFICANT CHANGE UP (ref 2–14)
NEUTROPHILS # BLD AUTO: 3.66 K/UL — SIGNIFICANT CHANGE UP (ref 1.8–7.4)
NEUTROPHILS NFR BLD AUTO: 58.8 % — SIGNIFICANT CHANGE UP (ref 43–77)
NRBC # BLD: 0 /100 WBCS — SIGNIFICANT CHANGE UP (ref 0–0)
PHOSPHATE SERPL-MCNC: 3.5 MG/DL — SIGNIFICANT CHANGE UP (ref 2.5–4.5)
PLATELET # BLD AUTO: 221 K/UL — SIGNIFICANT CHANGE UP (ref 150–400)
POTASSIUM SERPL-MCNC: 3.6 MMOL/L — SIGNIFICANT CHANGE UP (ref 3.5–5.3)
POTASSIUM SERPL-SCNC: 3.6 MMOL/L — SIGNIFICANT CHANGE UP (ref 3.5–5.3)
RBC # BLD: 3.95 M/UL — LOW (ref 4.2–5.8)
RBC # FLD: 12.8 % — SIGNIFICANT CHANGE UP (ref 10.3–14.5)
SARS-COV-2 IGG+IGM SERPL QL IA: >250 U/ML — HIGH
SARS-COV-2 IGG+IGM SERPL QL IA: POSITIVE
SODIUM SERPL-SCNC: 145 MMOL/L — SIGNIFICANT CHANGE UP (ref 135–145)
TROPONIN T SERPL-MCNC: 0.07 NG/ML — CRITICAL HIGH (ref 0–0.01)
VIT B12 SERPL-MCNC: 312 PG/ML — SIGNIFICANT CHANGE UP (ref 232–1245)
WBC # BLD: 6.25 K/UL — SIGNIFICANT CHANGE UP (ref 3.8–10.5)
WBC # FLD AUTO: 6.25 K/UL — SIGNIFICANT CHANGE UP (ref 3.8–10.5)

## 2021-07-20 PROCEDURE — 80048 BASIC METABOLIC PNL TOTAL CA: CPT

## 2021-07-20 PROCEDURE — 86769 SARS-COV-2 COVID-19 ANTIBODY: CPT

## 2021-07-20 PROCEDURE — 84484 ASSAY OF TROPONIN QUANT: CPT

## 2021-07-20 PROCEDURE — 93005 ELECTROCARDIOGRAM TRACING: CPT

## 2021-07-20 PROCEDURE — 85025 COMPLETE CBC W/AUTO DIFF WBC: CPT

## 2021-07-20 PROCEDURE — 87635 SARS-COV-2 COVID-19 AMP PRB: CPT

## 2021-07-20 PROCEDURE — 81001 URINALYSIS AUTO W/SCOPE: CPT

## 2021-07-20 PROCEDURE — 99232 SBSQ HOSP IP/OBS MODERATE 35: CPT

## 2021-07-20 PROCEDURE — 93306 TTE W/DOPPLER COMPLETE: CPT | Mod: 26

## 2021-07-20 PROCEDURE — G0378: CPT

## 2021-07-20 PROCEDURE — 85610 PROTHROMBIN TIME: CPT

## 2021-07-20 PROCEDURE — 71045 X-RAY EXAM CHEST 1 VIEW: CPT

## 2021-07-20 PROCEDURE — 70544 MR ANGIOGRAPHY HEAD W/O DYE: CPT | Mod: ME

## 2021-07-20 PROCEDURE — 36415 COLL VENOUS BLD VENIPUNCTURE: CPT

## 2021-07-20 PROCEDURE — 82962 GLUCOSE BLOOD TEST: CPT

## 2021-07-20 PROCEDURE — 83036 HEMOGLOBIN GLYCOSYLATED A1C: CPT

## 2021-07-20 PROCEDURE — 80053 COMPREHEN METABOLIC PANEL: CPT

## 2021-07-20 PROCEDURE — 85730 THROMBOPLASTIN TIME PARTIAL: CPT

## 2021-07-20 PROCEDURE — 85027 COMPLETE CBC AUTOMATED: CPT

## 2021-07-20 PROCEDURE — 72141 MRI NECK SPINE W/O DYE: CPT | Mod: ME

## 2021-07-20 PROCEDURE — G1004: CPT

## 2021-07-20 PROCEDURE — 84443 ASSAY THYROID STIM HORMONE: CPT

## 2021-07-20 PROCEDURE — 83735 ASSAY OF MAGNESIUM: CPT

## 2021-07-20 PROCEDURE — 80061 LIPID PANEL: CPT

## 2021-07-20 PROCEDURE — 70450 CT HEAD/BRAIN W/O DYE: CPT | Mod: MA

## 2021-07-20 PROCEDURE — 70547 MR ANGIOGRAPHY NECK W/O DYE: CPT | Mod: ME

## 2021-07-20 PROCEDURE — 99285 EMERGENCY DEPT VISIT HI MDM: CPT | Mod: 25

## 2021-07-20 PROCEDURE — 97161 PT EVAL LOW COMPLEX 20 MIN: CPT

## 2021-07-20 PROCEDURE — 93306 TTE W/DOPPLER COMPLETE: CPT

## 2021-07-20 PROCEDURE — 84100 ASSAY OF PHOSPHORUS: CPT

## 2021-07-20 PROCEDURE — 82607 VITAMIN B-12: CPT

## 2021-07-20 RX ORDER — ATORVASTATIN CALCIUM 80 MG/1
1 TABLET, FILM COATED ORAL
Qty: 30 | Refills: 0
Start: 2021-07-20 | End: 2021-08-18

## 2021-07-20 RX ORDER — ATORVASTATIN CALCIUM 80 MG/1
20 TABLET, FILM COATED ORAL
Qty: 0 | Refills: 3 | DISCHARGE

## 2021-07-20 RX ORDER — PREGABALIN 225 MG/1
1 CAPSULE ORAL
Qty: 30 | Refills: 0
Start: 2021-07-20 | End: 2021-08-18

## 2021-07-20 RX ORDER — CLOPIDOGREL BISULFATE 75 MG/1
1 TABLET, FILM COATED ORAL
Qty: 30 | Refills: 0
Start: 2021-07-20 | End: 2021-08-18

## 2021-07-20 RX ADMIN — Medication 81 MILLIGRAM(S): at 10:59

## 2021-07-20 RX ADMIN — HEPARIN SODIUM 5000 UNIT(S): 5000 INJECTION INTRAVENOUS; SUBCUTANEOUS at 13:47

## 2021-07-20 RX ADMIN — Medication 100 MILLIGRAM(S): at 01:57

## 2021-07-20 RX ADMIN — CLOPIDOGREL BISULFATE 75 MILLIGRAM(S): 75 TABLET, FILM COATED ORAL at 10:59

## 2021-07-20 RX ADMIN — CARVEDILOL PHOSPHATE 25 MILLIGRAM(S): 80 CAPSULE, EXTENDED RELEASE ORAL at 06:42

## 2021-07-20 RX ADMIN — Medication 100 MILLIGRAM(S): at 13:47

## 2021-07-20 RX ADMIN — Medication 100 MILLIGRAM(S): at 06:42

## 2021-07-20 RX ADMIN — HEPARIN SODIUM 5000 UNIT(S): 5000 INJECTION INTRAVENOUS; SUBCUTANEOUS at 06:42

## 2021-07-20 NOTE — DIETITIAN INITIAL EVALUATION ADULT. - OTHER CALCULATIONS
%LFX=355; IBW used to calculate energy needs due to pt's current body weight exceeding 120% of IBW; adjusted for age and current conditions

## 2021-07-20 NOTE — DIETITIAN INITIAL EVALUATION ADULT. - PERSON TAUGHT/METHOD
DASH/DM diet edu/verbal instruction/patient instructed/spouse instructed/teach back - (Patient repeats in own words)

## 2021-07-20 NOTE — DISCHARGE NOTE PROVIDER - NSDCCPCAREPLAN_GEN_ALL_CORE_FT
PRINCIPAL DISCHARGE DIAGNOSIS  Diagnosis: Numbness  Assessment and Plan of Treatment: You were admitetd for left sided numbness. Wirkup done including MR angiogo head and neck and MRI spine was negative for any acute pathology explaining the symptoms.  Continue medication as prescriped  Follow up with cardiology as out patient       PRINCIPAL DISCHARGE DIAGNOSIS  Diagnosis: Intracranial atherosclerosis  Assessment and Plan of Treatment: You ere admitted for left sided numbness, workup done including CT head, MR Angiogram of head and vessels showed focal narrowing involving one of the branches of your brain vessels (M2).Echo cardio was normal.  Plan:  -Continue Asa 81 mg  -start plavix 75 mg daily along witrh ASA for 3 months  -Increase lipitor to 80 mg daily at bedtime  -Life style modification diet, excercise, weight loss  -Avoid smoking  -Keep BP <130/80

## 2021-07-20 NOTE — DISCHARGE NOTE PROVIDER - NSDCFUSCHEDAPPT_GEN_ALL_CORE_FT
WILIAN HADLEY ; 07/27/2021 ; NPP [facility] [address]  WILIAN HADLEY ; 08/18/2021 ; NPP Nephro [address]

## 2021-07-20 NOTE — PHYSICAL THERAPY INITIAL EVALUATION ADULT - PERTINENT HX OF CURRENT PROBLEM, REHAB EVAL
49 y/o male with PMHx of HTN, Hyperlipidemia, CKD, NSTEMI, and DM-2 present to [facility] ER this afternoon, 7/19/21, complaining of left-sided numbness, described as "pins and needles" that begins at his left bottom lip and travels down his entire left side.  CTH/MRI head neg for acute infarct. CT cervical spine neg for core compression.

## 2021-07-20 NOTE — DISCHARGE NOTE PROVIDER - CARE PROVIDER_API CALL
Corwin Foster)  Internal Medicine  [address] [address]  Phone: [phone]  Fax: [phone]  Follow Up Time:    Corwin Foster)  Internal Medicine  [address] [address]  Phone: [phone]  Fax: [phone]  Follow Up Time:     Alyson Garcia)  Neurology; Vascular Neurology  [address] [address]  Phone: [phone]  Fax: [phone]  Follow Up Time: 2 weeks

## 2021-07-20 NOTE — OCCUPATIONAL THERAPY INITIAL EVALUATION ADULT - ANTICIPATED DISCHARGE DISPOSITION, OT EVAL
Home no OT services needed. Pt demonstrates at baseline/WFL (IND) for ADLs and functional mobility./no needs

## 2021-07-20 NOTE — DISCHARGE NOTE PROVIDER - HOSPITAL COURSE
49 y/o male with PMHx of HTN, Hyperlipidemia, CKD, NSTEMI, and DM-2 present to [facility] ER this afternoon, 7/19/21, w/left-sided numbness that has been recurring for ~3y. Of note, was here 7/17/21 with same symptom set and ruled out for cardiac etiology through down-trending troponin. Denies acute onset of any additional numbness, weakness, tingling, slurred speech, blurry vision, double vision, dizziness, headache. Admitted to stroke/neurology for further workup with MRA head/neck and MR cervical spine w/o contrast.  NIHSS 0  Symptoms on discharge: Resolved numbness/ asymptomatic  Workup:  CTH negative  MRA head/ neck focal area of mild to moderate narrowing involving the right M1 segment prior to the bifurcation. No acute ischemia. No definite carotid stenosis.  MRI cervical spine   LDL 77, A1C 7.1         49 y/o male with PMHx of HTN, Hyperlipidemia, CKD, NSTEMI, and DM-2 present to [facility] ER this afternoon, 7/19/21, w/left-sided numbness that has been recurring for ~3y. Of note, was here 7/17/21 with same symptom set and ruled out for cardiac etiology through down-trending troponin. Denies acute onset of any additional numbness, weakness, tingling, slurred speech, blurry vision, double vision, dizziness, headache. Admitted to stroke/neurology for further workup with MRA head/neck and MR cervical spine w/o contrast.  NIHSS 0  Symptoms on discharge: Resolved numbness/ asymptomatic  Workup:  CTH negative  MRA head/ neck focal area of mild to moderate narrowing involving the right M1 segment prior to the bifurcation. No acute ischemia. No definite carotid stenosis.  MRI cervical spine showed mild neuronal foramen narrowing at c3-c34, c4-c5, c5-c6  TTE normal  LDL 77, A1C 7.1    Patient's symptoms resolved completely. He will be dc on DAPT, high intensity statin, and BP control         51 y/o male with PMHx of HTN, Hyperlipidemia, CKD, NSTEMI, and DM-2 present to [facility] ER this afternoon, 7/19/21, w/left-sided numbness that has been recurring for ~3y. Of note, was here 7/17/21 with same symptom set and ruled out for cardiac etiology through down-trending troponin. Denies acute onset of any additional numbness, weakness, tingling, slurred speech, blurry vision, double vision, dizziness, headache. Admitted to stroke/neurology for further workup with MRA head/neck and MR cervical spine w/o contrast.  NIHSS 0  Symptoms on discharge: Resolved numbness/ asymptomatic  Workup:  CTH negative  MRA head/ neck focal area of mild to moderate narrowing involving the right M1 segment prior to the bifurcation (intracranial atherosclerosis). No acute ischemia. No definite carotid stenosis.  MRI cervical spine showed mild neuronal foramen narrowing at c3-c34, c4-c5, c5-c6  TTE normal  LDL 77, A1C 7.1    Patient's symptoms resolved completely. He will be dc on DAPT for 3 months, high intensity statin, and longterm BP control-goal BP<140/90

## 2021-07-20 NOTE — DISCHARGE NOTE PROVIDER - NSDCMRMEDTOKEN_GEN_ALL_CORE_FT
amLODIPine: 5 milligram(s) orally once a day  Aspirin Enteric Coated 81 mg oral delayed release tablet: 81 milligram(s) orally once a day  atorvastatin: 20 milligram(s) orally once a day (at bedtime)  carvedilol: 25 milligram(s) orally 2 times a day  hydrALAZINE: 100 milligram(s) orally 3 times a day  isosorbide mononitrate: 30 milligram(s) orally 3 times a day  isosorbide mononitrate: 30 milligram(s) orally 3 times a day  losartan: 50 milligram(s) orally once a day  torsemide: 10 milligram(s) orally once a day   amLODIPine: 5 milligram(s) orally once a day  Aspirin Enteric Coated 81 mg oral delayed release tablet: 81 milligram(s) orally once a day  carvedilol: 25 milligram(s) orally 2 times a day  clopidogrel 75 mg oral tablet: 1 tab(s) orally once a day  hydrALAZINE: 100 milligram(s) orally 3 times a day  isosorbide mononitrate: 30 milligram(s) orally 3 times a day  isosorbide mononitrate: 30 milligram(s) orally 3 times a day  losartan: 50 milligram(s) orally once a day  torsemide: 10 milligram(s) orally once a day   amLODIPine: 5 milligram(s) orally once a day  Aspirin Enteric Coated 81 mg oral delayed release tablet: 81 milligram(s) orally once a day  atorvastatin 80 mg oral tablet: 1 tab(s) orally once a day (at bedtime)   carvedilol: 25 milligram(s) orally 2 times a day  clopidogrel 75 mg oral tablet: 1 tab(s) orally once a day  hydrALAZINE: 100 milligram(s) orally 3 times a day  isosorbide mononitrate: 30 milligram(s) orally 3 times a day  isosorbide mononitrate: 30 milligram(s) orally 3 times a day  losartan: 50 milligram(s) orally once a day  torsemide: 10 milligram(s) orally once a day   amLODIPine: 5 milligram(s) orally once a day  Aspirin Enteric Coated 81 mg oral delayed release tablet: 81 milligram(s) orally once a day  atorvastatin 80 mg oral tablet: 1 tab(s) orally once a day (at bedtime)   carvedilol: 25 milligram(s) orally 2 times a day  clopidogrel 75 mg oral tablet: 1 tab(s) orally once a day  hydrALAZINE: 100 milligram(s) orally 3 times a day  isosorbide mononitrate: 30 milligram(s) orally 3 times a day  isosorbide mononitrate: 30 milligram(s) orally 3 times a day  losartan: 50 milligram(s) orally once a day  torsemide: 10 milligram(s) orally once a day  Vitamin B12 1000 mcg oral tablet: 1 tab(s) orally once a day

## 2021-07-20 NOTE — DISCHARGE NOTE NURSING/CASE MANAGEMENT/SOCIAL WORK - PATIENT PORTAL LINK FT
You can access the FollowMyHealth Patient Portal offered by [facility] by registering at the following website: [email or web address]. By joining [employer]’s FollowMyHealth portal, you will also be able to view your health information using other applications (apps) compatible with our system.

## 2021-07-20 NOTE — OCCUPATIONAL THERAPY INITIAL EVALUATION ADULT - MD ORDER
49 y/o male present to [facility] ER this afternoon, 7/19/21, complaining of left-sided numbness, described as "pins and needles" that begins at his left bottom lip and travels down his entire left side. Neurological exam and CTH stroke protocol are unremarkable, but in the setting of recurrent unresolved episodes spanning several years, given 150 mg Plavix in ED, & patient to be admitted to stroke/neurology service for MRA head/neck and MR cervical spine.

## 2021-07-20 NOTE — PHYSICAL THERAPY INITIAL EVALUATION ADULT - MODALITIES TREATMENT COMMENTS
Vision intact. H test: able to track smoothly to all fields. All other cranial nerves intact. SILT face

## 2021-07-20 NOTE — DIETITIAN INITIAL EVALUATION ADULT. - DIET TYPE
(d/c renal - k/phos WDL)/consistent carbohydrate (no snacks)/DASH/TLC (sodium and cholesterol restricted diet)

## 2021-07-20 NOTE — OCCUPATIONAL THERAPY INITIAL EVALUATION ADULT - LIVES WITH, PROFILE
pt lives with family. Pt at baseline is ind for ADLs and functional mobility. +walk-in shower./children/spouse

## 2021-07-20 NOTE — DIETITIAN INITIAL EVALUATION ADULT. - OTHER INFO
51 y/o male with PMHx of HTN, Hyperlipidemia, CKD, NSTEMI, and DM-2 present to [facility] ER 7/19/21, with left-sided numbness that has been recurring for ~3y. Of note, was here 7/17/21 with same symptom set and ruled out for cardiac etiology through down-trending troponin.  Admitted to stroke/neurology for further workup with MRA head/neck and MR cervical spine w/o contrast.     Pt visited, wife at bedside. Reports eating well at this time/PTA. Wife does cooking, seems to be on regular diet with little prior diet education however agreeable for education at this time. Bedside dys screen passed, no issues chewing/swallowing. NKFA. Denies wt change however  pounds vs admit wt 216 pounds suggests 11 pound wt difference, will cont to trend wts. No pain. No edema/pressure ulcers. Jamarcus 22. +BM 7/19, denies NVDC.   Please see below for nutritions recommendations. Paged team.

## 2021-07-20 NOTE — DISCHARGE NOTE PROVIDER - PROVIDER TOKENS
PROVIDER:[TOKEN:[[id number]]] PROVIDER:[TOKEN:[[id number]]],PROVIDER:[TOKEN:[[id number]],FOLLOWUP:[2 weeks]]

## 2021-07-27 ENCOUNTER — APPOINTMENT (OUTPATIENT)
Dept: HEART AND VASCULAR | Facility: CLINIC | Age: 51
End: 2021-07-27
Payer: COMMERCIAL

## 2021-07-27 ENCOUNTER — NON-APPOINTMENT (OUTPATIENT)
Age: 51
End: 2021-07-27

## 2021-07-27 VITALS
TEMPERATURE: 96.4 F | HEART RATE: 78 BPM | HEIGHT: 69 IN | OXYGEN SATURATION: 99 % | BODY MASS INDEX: 31.1 KG/M2 | SYSTOLIC BLOOD PRESSURE: 135 MMHG | DIASTOLIC BLOOD PRESSURE: 78 MMHG | WEIGHT: 210 LBS

## 2021-07-27 DIAGNOSIS — I25.2 OLD MYOCARDIAL INFARCTION: ICD-10-CM

## 2021-07-27 DIAGNOSIS — Z79.82 LONG TERM (CURRENT) USE OF ASPIRIN: ICD-10-CM

## 2021-07-27 DIAGNOSIS — R20.0 ANESTHESIA OF SKIN: ICD-10-CM

## 2021-07-27 DIAGNOSIS — E78.5 HYPERLIPIDEMIA, UNSPECIFIED: ICD-10-CM

## 2021-07-27 DIAGNOSIS — E11.22 TYPE 2 DIABETES MELLITUS WITH DIABETIC CHRONIC KIDNEY DISEASE: ICD-10-CM

## 2021-07-27 DIAGNOSIS — N18.30 CHRONIC KIDNEY DISEASE, STAGE 3 UNSPECIFIED: ICD-10-CM

## 2021-07-27 DIAGNOSIS — I12.9 HYPERTENSIVE CHRONIC KIDNEY DISEASE WITH STAGE 1 THROUGH STAGE 4 CHRONIC KIDNEY DISEASE, OR UNSPECIFIED CHRONIC KIDNEY DISEASE: ICD-10-CM

## 2021-07-27 PROCEDURE — 99214 OFFICE O/P EST MOD 30 MIN: CPT

## 2021-07-27 PROCEDURE — 93000 ELECTROCARDIOGRAM COMPLETE: CPT

## 2021-07-27 RX ORDER — ATORVASTATIN CALCIUM 20 MG/1
20 TABLET, FILM COATED ORAL
Qty: 90 | Refills: 3 | Status: DISCONTINUED | COMMUNITY
End: 2021-07-27

## 2021-07-27 RX ORDER — EVENING PRIMROSE OIL 500 MG
2000 CAPSULE ORAL DAILY
Refills: 2 | Status: ACTIVE | COMMUNITY
Start: 2021-07-27

## 2021-07-27 NOTE — HISTORY OF PRESENT ILLNESS
[FreeTextEntry1] : 50 M CKD 4, proteinuria, HTN DM, ALMAS on CPAP\par \par referred by [facility] was in ed for left sided facial lip and left arm and left leg no triggers had it after an interview no triggers lasts a minute or two neuro work up showed narrowing of M1 started on DAPT and high dose statin he has no exertional chest pain \par \par \par ecg nsr \par echo 7/2021 Normal EF

## 2021-07-27 NOTE — ASSESSMENT
[FreeTextEntry1] : HTN elevated should go back to amlodipine 10 will reach out to renal\par his facial symptoms and left sided arm symptoms may be neurologic they are not cardiac \par Given his proteinuria he may benefit from SGLT2 although borderline GFR\par Fu as needed no need for further cardiac work up at this time he is in GDMT\par

## 2021-08-09 PROBLEM — I21.4 NON-ST ELEVATION (NSTEMI) MYOCARDIAL INFARCTION: Chronic | Status: ACTIVE | Noted: 2021-07-19

## 2021-08-18 ENCOUNTER — APPOINTMENT (OUTPATIENT)
Dept: NEPHROLOGY | Facility: CLINIC | Age: 51
End: 2021-08-18

## 2021-08-26 ENCOUNTER — APPOINTMENT (OUTPATIENT)
Dept: NEPHROLOGY | Facility: CLINIC | Age: 51
End: 2021-08-26
Payer: COMMERCIAL

## 2021-08-26 VITALS — DIASTOLIC BLOOD PRESSURE: 66 MMHG | SYSTOLIC BLOOD PRESSURE: 115 MMHG | HEART RATE: 76 BPM

## 2021-08-26 PROCEDURE — 99214 OFFICE O/P EST MOD 30 MIN: CPT

## 2021-08-26 RX ORDER — ERGOCALCIFEROL 1.25 MG/1
1.25 MG CAPSULE, LIQUID FILLED ORAL
Qty: 12 | Refills: 0 | Status: DISCONTINUED | COMMUNITY
Start: 2021-01-05 | End: 2021-08-26

## 2021-08-26 NOTE — HISTORY OF PRESENT ILLNESS
[FreeTextEntry1] : 52 yo M following up for CKD 4, proteinuria, HTN and DMT2.\par \par Had gone to [facility] ER and admitted for observation of left sided paraesthesias that lasted longer than usual.  States it occurs once in a while and can feel sensation come on and leave, often resolves within a minute.  Full workup included CT brain, MR angio brain/neck, MR c-spine, and echo w/ bubble and doppler largely negative.  Started on Plavix x3 months and atorvastatin increased to 80mg.  Neuro evals negative.  Followed up with cardio outpatient, not cardiac related.   \par COVID vaccinated - 2nd Moderna in June.\par Now on Ozempic as monotherapy for DM.  Previous trial of Trulicity not well tolerated.   \par Occasionally checking home BP has been well controlled.  Compliant with medications and nasal CPAP.  \par LE swelling controlled on daily torsemide.  Mild swelling reported if sitting at work for long time.\par No NSAID use\par Denies HA, CP, SOB, dizziness. Frothy urine but no flank pain, dysuria or hematuria\par \par PCP: Dr. Corwin Foster\par endo: Dr. Zenaida Macias\par \par 1/7/21: Reports feeling fleeting episodes of left sided numbness lasting up to a minute.  Had echo and duplex carotid US done on 12/31 for PCP.  \par 12/2019: Optometry revealed mild proliferative changes due to DM, f/u 1 year.\par \par PMH: \par In [facility] May 2019 for accelerated HTN and found to have STAN- with increase in creat to 2.4 from 1.79 baseline. Is aware that he had not been focused on BP and also A1C elevated-

## 2021-08-26 NOTE — ASSESSMENT
[FreeTextEntry1] : 50 yo man with CKD 4, HTN, proteinuria, DMT2 and prior h/o STAN\par --HTN- at goal today, also good at home per pt.  Appears that PCP had prescribed amlodipine 5mg (had been on 10mg), okay to keep 5mg dose but to increase back to 10mg if consistently >130/80 at home.  Continue current regimen an monitor routinely.  \par Wrist monitor previously correlated well with systolic readings compared to office device, diastolic readings about 15mmHg too high. \par Continue weight loss efforts and healthy lifestyle choices. \par --CKD 3/4- creat 2.65 (eGFR 31) stable, peak of 3.6 in Jan2021.  Possibly hemodynamic change on reduced torsemide.  electrolytes and volume status good.  \par To focus on improving  A1C and monitoring BP; limit protein intake; avoiding NSAIDs.\par Good candidate for finerenone, however he wishes to hold off starting additional med since he takes so many already.  To reconsider in future for additional renal protection. Already on GLP1-RA.\par Previously discussed likely need for dialysis and/or kidney transplant in the future and had expressed that he had few family members who are willing to donate a kidney.\par --proteinuria-- last PCR 2.7g,- continue losartan 50mg daily, defer increasing due to rising Cr.  Continue with BP and A1C optimization.  GN workup negative. As above, consider Karendia, or spironolactone if BP elevated.\par --DM- last A1C 7.8%,follow up with endo. To follow-up optometry examination this year.\par --ALMAS - c/w CPAP and weight loss efforts.\par --HLD - c/w increased statin dose \par --hyperuremia - uric acid previously elevated.  no hx gout attacks, monitor.  To be mindful of symptoms and inform us if any issues.\par --hyperparathyroidism, secondary.  recheck levels.  completed ergocalciferol, consider calcitriol.  \par \par To complete labs next month, will call results\par f/u 3 months

## 2021-09-17 NOTE — PATIENT PROFILE ADULT - NSPROEDALEARNPREF_GEN_A_NUR
09/17/21 9:10 AM     [facility]       Garlan Foots
verbal instruction

## 2021-10-12 ENCOUNTER — APPOINTMENT (OUTPATIENT)
Dept: NEUROLOGY | Facility: CLINIC | Age: 51
End: 2021-10-12
Payer: COMMERCIAL

## 2021-10-12 VITALS
HEIGHT: 70 IN | OXYGEN SATURATION: 95 % | WEIGHT: 213 LBS | DIASTOLIC BLOOD PRESSURE: 78 MMHG | SYSTOLIC BLOOD PRESSURE: 136 MMHG | RESPIRATION RATE: 16 BRPM | TEMPERATURE: 98.4 F | HEART RATE: 70 BPM | BODY MASS INDEX: 30.49 KG/M2

## 2021-10-12 PROCEDURE — 99215 OFFICE O/P EST HI 40 MIN: CPT

## 2021-10-12 NOTE — HISTORY OF PRESENT ILLNESS
[FreeTextEntry1] : Pt is 52yo M with PMHx of HTN, Hyperlipidemia, CKD, NSTEMI, and DM-2 presents today as a hospital f/u. He initially presented to [facility] ER on 7/19/21, w/ left-sided numbness that has been recurring for ~3y. Of note, was seen on 7/17/21 with same symptoms and ruled out for cardiac etiology through down-trending troponin. Denied any associated weakness, tingling, slurred speech, blurry vision, double vision, dizziness, headache with his numbness. \par \par Workup:\par CTH negative\par MRA head/ neck focal area of mild to moderate narrowing involving the right M1 segment prior to the bifurcation (intracranial atherosclerosis). No acute ischemia. No definite carotid stenosis.\par MRI cervical spine showed mild neuronal foramen narrowing at c3-c34, c4-c5, c5-c6\par TTE normal\par LDL 77, A1C 7.1\par \par Patient's symptoms resolved completely prior to discharge \par \par Discharge Medications	\par amLODIPine: 5 milligram(s) orally once a day\par Aspirin Enteric Coated 81 mg oral delayed release tablet: 81 milligram(s) orally once a day\par atorvastatin 80 mg oral tablet: 1 tab(s) orally once a day (at bedtime) \par carvedilol: 25 milligram(s) orally 2 times a day\par clopidogrel 75 mg oral tablet: 1 tab(s) orally once a day\par hydrALAZINE: 100 milligram(s) orally 3 times a day\par isosorbide mononitrate: 30 milligram(s) orally 3 times a day\par losartan: 50 milligram(s) orally once a day\par torsemide: 10 milligram(s) orally once a day\par Vitamin B12 1000 mcg oral tablet: 1 tab(s) orally once a day\par \par Since d/c pt continues to have these very brief and intermittent episodes of L sided numbness. Numbness is L perioral, L arm, and L leg. Last episode was 3 weeks ago. \par \par Cardiovascular risk factors: \par CKD- Has been seen by his nephrologist and endocrinologist . Recently had his DM adjusted with improvement in A1C and renal function. \par DM- Last A1c 7.1 on Ozempic \par HTN- well-controlled on current regimen. BP's typically normotensive at home. 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 Denies any significant family hx Moms side\par Denies hx migraines/ seizures \par \par

## 2021-10-12 NOTE — PHYSICAL EXAM
[FreeTextEntry1] : Mental status: The patient is alert and oriented x3, naming intact x3, repetition normal, follows three-step commands, and is able to participate fully in the history taking. \par Speech is clear and fluent with good repetition, comprehension, and naming. No evidence of dysarthria.\par Memory is intact: Immediate recall 3 out of 3, short-term 3 out of 3, remote memory intact. \par \par Cranial nerves II through XII intact: \par CN II: Visual fields are full to confrontation. Pupils are 4 mm and briskly reactive to light. Visual acuity is 20/20 bilaterally.\par CN III, IV, VI: At primary gaze, there is no eye deviation. EOMI. No ptosis\par CN V: Facial sensation is intact to pinprick in all 3 divisions bilaterally. \par CN VII: Face is symmetric with normal eye closure and smile.\par CN VII: Hearing is normal to rubbing fingers\par CN IX, X: Palate elevates symmetrically. Phonation is normal.\par CN XI: Head turning and shoulder shrug are intact\par CN XII: Tongue is midline with normal movements and no atrophy.\par \par Motor exam: Upper and lower extremities 5 out of 5 power, normal muscle tone and bulk. No abnormal movements noted.\par Sensory exam: Light/sharp sensation impaired distal LLE foot. Vibration decreased <10 seconds LLE toes, intact at ankles and above, position sense intact. Romberg absent.\par Coordination and vestibular exam: Finger to nose intact, no evidence of truncal or appendicular ataxia. No evidence of nystagmus. No vestibular symptoms elicited with head turning during ambulation.\par Gait: Normal stance and gait. Posture is normal. Gait is steady with normal steps, base, arm swing, and turning. Heel and toe walking are normal. Tandem gait is normal. \par Reflexes: One to 2+ in upper and lower extremities. No pathological reflexes. Downgoing toes.\par \par General physical examination:\par HEENT: Normocephalic atraumatic\par Funduscopic: No disc edema\par Neck supple with no JVD. No evidence of carotid bruit.\par Cardiac: S1-S2 regular rate and rhythm, no murmur noted.\par Chest: Clear to auscultation\par Abdomen: soft, nontender \par Extremity: No edema, normal pulses.\par

## 2021-11-17 ENCOUNTER — APPOINTMENT (OUTPATIENT)
Dept: NEUROLOGY | Facility: CLINIC | Age: 51
End: 2021-11-17
Payer: COMMERCIAL

## 2021-11-17 PROCEDURE — 95819 EEG AWAKE AND ASLEEP: CPT

## 2021-11-18 ENCOUNTER — APPOINTMENT (OUTPATIENT)
Dept: NEUROLOGY | Facility: CLINIC | Age: 51
End: 2021-11-18

## 2021-11-18 PROCEDURE — 95719 EEG PHYS/QHP EA INCR W/O VID: CPT

## 2021-11-18 PROCEDURE — 95708 EEG WO VID EA 12-26HR UNMNTR: CPT

## 2021-11-18 PROCEDURE — 95700 EEG CONT REC W/VID EEG TECH: CPT

## 2021-11-23 ENCOUNTER — APPOINTMENT (OUTPATIENT)
Dept: NEUROLOGY | Facility: CLINIC | Age: 51
End: 2021-11-23
Payer: COMMERCIAL

## 2021-11-23 DIAGNOSIS — G40.309 GENERALIZED IDIOPATHIC EPILEPSY AND EPILEPTIC SYNDROMES, NOT INTRACTABLE, W/OUT STATUS EPILEPTICUS: ICD-10-CM

## 2021-11-23 DIAGNOSIS — G45.9 TRANSIENT CEREBRAL ISCHEMIC ATTACK, UNSPECIFIED: ICD-10-CM

## 2021-11-23 PROCEDURE — 99213 OFFICE O/P EST LOW 20 MIN: CPT | Mod: 95

## 2021-12-08 ENCOUNTER — APPOINTMENT (OUTPATIENT)
Dept: NEPHROLOGY | Facility: CLINIC | Age: 51
End: 2021-12-08

## 2021-12-27 ENCOUNTER — APPOINTMENT (OUTPATIENT)
Dept: NEPHROLOGY | Facility: CLINIC | Age: 51
End: 2021-12-27

## 2021-12-28 ENCOUNTER — APPOINTMENT (OUTPATIENT)
Dept: NEUROLOGY | Facility: CLINIC | Age: 51
End: 2021-12-28

## 2021-12-30 ENCOUNTER — EMERGENCY (EMERGENCY)
Facility: HOSPITAL | Age: 51
LOS: 1 days | Discharge: HOME | End: 2021-12-30
Attending: EMERGENCY MEDICINE | Admitting: EMERGENCY MEDICINE
Payer: COMMERCIAL

## 2021-12-30 VITALS
OXYGEN SATURATION: 97 % | RESPIRATION RATE: 20 BRPM | WEIGHT: 205.03 LBS | SYSTOLIC BLOOD PRESSURE: 122 MMHG | TEMPERATURE: 98 F | DIASTOLIC BLOOD PRESSURE: 73 MMHG | HEIGHT: 69 IN | HEART RATE: 76 BPM

## 2021-12-30 DIAGNOSIS — Z90.49 ACQUIRED ABSENCE OF OTHER SPECIFIED PARTS OF DIGESTIVE TRACT: Chronic | ICD-10-CM

## 2021-12-30 DIAGNOSIS — E11.9 TYPE 2 DIABETES MELLITUS WITHOUT COMPLICATIONS: ICD-10-CM

## 2021-12-30 DIAGNOSIS — E78.5 HYPERLIPIDEMIA, UNSPECIFIED: ICD-10-CM

## 2021-12-30 DIAGNOSIS — Z79.82 LONG TERM (CURRENT) USE OF ASPIRIN: ICD-10-CM

## 2021-12-30 DIAGNOSIS — I12.9 HYPERTENSIVE CHRONIC KIDNEY DISEASE WITH STAGE 1 THROUGH STAGE 4 CHRONIC KIDNEY DISEASE, OR UNSPECIFIED CHRONIC KIDNEY DISEASE: ICD-10-CM

## 2021-12-30 DIAGNOSIS — U07.1 COVID-19: ICD-10-CM

## 2021-12-30 DIAGNOSIS — N18.2 CHRONIC KIDNEY DISEASE, STAGE 2 (MILD): ICD-10-CM

## 2021-12-30 DIAGNOSIS — I10 ESSENTIAL (PRIMARY) HYPERTENSION: ICD-10-CM

## 2021-12-30 LAB — SARS-COV-2 RNA SPEC QL NAA+PROBE: DETECTED

## 2021-12-30 PROCEDURE — 93010 ELECTROCARDIOGRAM REPORT: CPT

## 2021-12-30 PROCEDURE — 99284 EMERGENCY DEPT VISIT MOD MDM: CPT

## 2021-12-30 PROCEDURE — 99283 EMERGENCY DEPT VISIT LOW MDM: CPT

## 2021-12-30 PROCEDURE — U0003: CPT

## 2021-12-30 PROCEDURE — U0005: CPT

## 2021-12-30 PROCEDURE — 93005 ELECTROCARDIOGRAM TRACING: CPT

## 2021-12-30 PROCEDURE — 82962 GLUCOSE BLOOD TEST: CPT

## 2021-12-30 NOTE — ED PROVIDER NOTE - ATTENDING CONTRIBUTION TO CARE
51M hx htn, dm, cad, concerned that his BP was high tonight.  pt states he felt anxious.  no chest pain, no SOB, no vomiting. complaint with his BP medications.    gen- nad  heent- ncat, clear conj  cv -rrr  lungs -ctab  ext -wwp, no edema  neuro -aox3, steady gait, elise  BP not currently elevated, no acute st/t wave changes on EKG.  pt can f/u with PMD

## 2021-12-30 NOTE — ED PROVIDER NOTE - NSFOLLOWUPINSTRUCTIONS_ED_ALL_ED_FT
Follow up with your doctor - call today.  Return to the Emergency Department if you have recurrent symptoms, any new symptoms, or if you have any concerns.  ====================    Chronic Hypertension    WHAT YOU NEED TO KNOW:    Hypertension is high blood pressure. Your blood pressure is the force of your blood moving against the walls of your arteries. Hypertension causes your blood pressure to get so high that your heart has to work much harder than normal. This can damage your heart. Even if you have hypertension for years, lifestyle changes, medicines, or both can help bring your blood pressure to normal.    DISCHARGE INSTRUCTIONS:    Call your local emergency number (911 in the ) or have someone call if:   •You have chest pain.      •You have any of the following signs of a heart attack: ?Squeezing, pressure, or pain in your chest      ?You may also have any of the following: ?Discomfort or pain in your back, neck, jaw, stomach, or arm      ?Shortness of breath      ?Nausea or vomiting      ?Lightheadedness or a sudden cold sweat        •You become confused or have difficulty speaking.      •You suddenly feel lightheaded or have trouble breathing.      Return to the emergency department if:   •You have a severe headache or vision loss.      •You have weakness in an arm or leg.      Call your doctor or cardiologist if:   •You feel faint, dizzy, confused, or drowsy.      •You have been taking your blood pressure medicine but your pressure is higher than your provider says it should be.      •You have questions or concerns about your condition or care.      Medicines: You may need any of the following:  •Antihypertensives may be used to help lower your blood pressure. Several kinds of medicines are available. Your healthcare provider may change the medicine or medicines you currently take. This may be needed if your blood pressure is often high when you check it at home or you are having other problems with blood pressure control.      •Diuretics help decrease extra fluid that collects in your body. This will help lower your BP. You may urinate more often while you take this medicine.      •Cholesterol medicine helps lower your cholesterol level. A low cholesterol level helps prevent heart disease and makes it easier to control your blood pressure.      •Take your medicine as directed. Contact your healthcare provider if you think your medicine is not helping or if you have side effects. Tell him or her if you are allergic to any medicine. Keep a list of the medicines, vitamins, and herbs you take. Include the amounts, and when and why you take them. Bring the list or the pill bottles to follow-up visits. Carry your medicine list with you in case of an emergency.      Stages of hypertension:     Blood Pressure Readings     •Normal blood pressure is 119/79 or lower. Your healthcare provider may only check your blood pressure each year if it stays at a normal level.      •Elevated blood pressure is 120/79 to 129/79. This is sometimes called prehypertension. Your healthcare provider may suggest lifestyle changes to help lower your blood pressure to a normal level. He or she may then check it again in 3 to 6 months.      •Stage 1 hypertension is 130/80 to 139/89. Your provider may recommend lifestyle changes, medication, and checks every 3 to 6 months until your blood pressure is controlled.      •Stage 2 hypertension is 140/90 or higher. Your provider will recommend lifestyle changes and have you take 2 kinds of hypertension medicines. You will also need to have your blood pressure checked monthly until it is controlled.      Manage chronic hypertension:   •Check your blood pressure at home. Avoid smoking, caffeine, and exercise at least 30 minutes before checking your blood pressure. Sit and rest for 5 minutes before you take your blood pressure. Extend your arm and support it on a flat surface. Your arm should be at the same level as your heart. Follow the directions that came with your blood pressure monitor. Check your blood pressure 2 times, 1 minute apart, before you take your medicine in the morning. Also check your blood pressure before your evening meal. Keep a record of your readings and bring it to your follow-up visits. Ask your healthcare provider what your blood pressure should be.  How to take a Blood Pressure           •Manage any other health conditions you have. Health conditions such as diabetes can increase your risk for hypertension. Follow your healthcare provider's instructions and take all your medicines as directed. Talk to your healthcare provider about any new health conditions you have recently developed.      •Ask about all medicines. Certain medicines can increase your blood pressure. Examples include oral birth control pills, decongestants, herbal supplements, and NSAIDs, such as ibuprofen. Your healthcare provider can tell you which medicines are safe for you to take. This includes prescription and over-the-counter medicines.      Lifestyle changes you can make to lower your blood pressure: Your provider may want you to make more lifestyle changes if you are having trouble controlling your blood pressure. This may feel difficult over time, especially if you think you are making good changes but your pressure is still high. It might help to focus on one new change at a time. For example, try to add 1 more day of exercise, or exercise for an extra 10 minutes on 2 days. Small changes can make a big difference. Your healthcare provider can also refer you to specialists such as a dietitian who can help you make small changes. Your family members may be included in helping you learn to create lifestyle changes, such as the following:    Ways to Lower Your Blood Pressure     •Limit sodium (salt) as directed. Too much sodium can affect your fluid balance. Check labels to find low-sodium or no-salt-added foods. Some low-sodium foods use potassium salts for flavor. Too much potassium can also cause health problems. Your healthcare provider will tell you how much sodium and potassium are safe for you to have in a day. He or she may recommend that you limit sodium to 2,300 mg a day.             •Follow the meal plan recommended by your healthcare provider. A dietitian or your provider can give you more information on low-sodium plans or the DASH (Dietary Approaches to Stop Hypertension) eating plan. The DASH plan is low in sodium, processed sugar, unhealthy fats, and total fat. It is high in potassium, calcium, and fiber. These can be found in vegetables, fruit, and whole-grain foods.             •Be physically active throughout the day. Physical activity, such as exercise, can help control your blood pressure and your weight. Be physically active for at least 30 minutes per day, on most days of the week. Include aerobic activity, such as walking or riding a bicycle. Also include strength training at least 2 times each week. Your healthcare providers can help you create a physical activity plan.   FAMILY WALKING FOR EXERCISE       Strength Training for Adults           •Decrease stress. This may help lower your blood pressure. Learn ways to relax, such as deep breathing or listening to music.      •Limit alcohol as directed. Alcohol can increase your blood pressure. A drink of alcohol is 12 ounces of beer, 5 ounces of wine, or 1½ ounces of liquor.      •Do not smoke. Nicotine and other chemicals in cigarettes and cigars can increase your blood pressure and also cause lung damage. Ask your healthcare provider for information if you currently smoke and need help to quit. E-cigarettes or smokeless tobacco still contain nicotine. Talk to your healthcare provider before you use these products.  Prevent Heart Disease            Follow up with your doctor or cardiologist as directed: You will need to return to have your blood pressure checked and to have other lab tests done. Write down your questions so you remember to ask them during your visits.

## 2021-12-30 NOTE — ED PROVIDER NOTE - CLINICAL SUMMARY MEDICAL DECISION MAKING FREE TEXT BOX
Pt with elevated BP at home, no s/s of end organ dysfunction; asymptomatic and normotensive in ED.  Will obtain EKG.

## 2021-12-30 NOTE — ED PROVIDER NOTE - OBJECTIVE STATEMENT
52 yo m with hx HTN DM, NSTEMI was at home in bed watching TV tonight when he felt "like my blood pressure was high" but could not describe exactly what the symptom was; he admits that he was feeling anxious.  He checked his BP and found systolic BP to be 170s or 180s (he did not remember), so he came to the hospital "just to be safe".  There was no headache, visual change, chest pain, difficulty breathing, back pain, abdominal pain, nausea, sweating; he has not had changes in bladder habits and no swelling of legs.  He is on multiple blood pressure meds and reports being complaint.  Nonsmoker. No drug or alcohol use.  No heavy caffeine use.  No stimulants.   He is normotensive here and asymptomatic.

## 2021-12-30 NOTE — ED ADULT NURSE NOTE - CAS DISCH CONDITION
Blood in the Urine    Blood in the urine (hematuria) has many possible causes. If it occurs after an injury (such as a car accident or fall), it is most often a sign of bruising to the kidney or bladder. Common causes of blood in the urine include urinary tract infections, kidney stones, inflammation, tumors, or certain other diseases of the kidney or bladder. Menstruation can cause blood to appear in the urine sample, although it is not coming from the urinary tract.  If only a trace amount of blood is present, it will show up on the urine test, even though the urine may be yellow and not pink or red. This may occur with any of the above conditions, as well as heavy exercise or high fever. In this case, your doctor may want to repeat the urine test on another day. This will show if the blood is still present. If it is, then other tests can be done to find out the cause.  Home care  Follow these home care guidelines:  · If your urine does not appear bloody (pink, brown or red) then you do not need to restrict your activity in any way.  · If you can see blood in your urine, rest and avoid heavy exertion until your next exam. Do not use aspirin, blood thinners, or anti-platelet or anti-inflammatory medicines. These include ibuprofen and naproxen. These thin the blood and may increase bleeding.  Follow-up care  Follow up with your healthcare provider, or as advised. If you were injured and had blood in your urine, you should have a repeat urine test in 1 to 2 days. Contact your doctor for this test.  A radiologist will review any X-rays that were taken. You will be told of any new findings that may affect your care.  When to seek medical advice  Call your healthcare provider right away if any of these occur:  · Bright red blood or blood clots in the urine (if you did not have this before)  · Weakness, dizziness or fainting  · New groin, abdominal, or back pain  · Fever of 100.4ºF (38ºC) or higher, or as directed by  your healthcare provider  · Repeated vomiting  · Bleeding from the nose or gums or easy bruising  Date Last Reviewed: 9/1/2016  © 0427-1786 [employer]. [address] [place] [address], [place] [address]. All rights reserved. This information is not intended as a substitute for professional medical care. Always follow your healthcare professional's instructions.         Stable

## 2021-12-30 NOTE — ED PROVIDER NOTE - CARE PROVIDER_API CALL
Maria De Jesus Duff (MD)  Internal Medicine; Nephrology  [address]  [place], [place] [address]  Phone: [phone]  Fax: [phone]  Follow Up Time:

## 2021-12-30 NOTE — ED PROVIDER NOTE - PATIENT PORTAL LINK FT
You can access the FollowMyHealth Patient Portal offered by [facility] by registering at the following website: [email or web address]. By joining [employer]’s FollowMyHealth portal, you will also be able to view your health information using other applications (apps) compatible with our system.

## 2021-12-30 NOTE — ED ADULT TRIAGE NOTE - CHIEF COMPLAINT QUOTE
Patient presents to ER with chief complaints of blood pressure elevated 170/80 despite compliance to BP medication x 1 day.

## 2021-12-30 NOTE — ED ADULT NURSE NOTE - OBJECTIVE STATEMENT
pt c/o HTN. pt states that his BP was in the 170s and he took his BP medication. BP is normotensive upon arrival. denies any other complaints. denies headache, double vision

## 2021-12-30 NOTE — ED PROVIDER NOTE - PHYSICAL EXAMINATION
CONSTITUTIONAL: NAD   SKIN: Normal color and turgor.    HEAD: NC/AT.  EYES: Conjunctiva clear. EOMI. PERRL.    ENT: Airway clear. Normal voice.   RESPIRATORY:  Normal work of breathing. Lungs CTAB.  CARDIOVASCULAR:  RRR, S1S2. No M/R/G.   No carotid bruits.  GI:  Abdomen soft, nontender.    MSK: Neck supple.  No lower extremity edema or calf tenderness.  No joint swelling or ROM limitation.  NEURO: Alert and oriented; CN II-XII grossly intact. Speech clear. 5/5 strength in all extremities.  Good balance. Steady gait. F-N normal.

## 2022-01-04 ENCOUNTER — APPOINTMENT (OUTPATIENT)
Dept: NEUROLOGY | Facility: CLINIC | Age: 52
End: 2022-01-04

## 2022-01-18 ENCOUNTER — APPOINTMENT (OUTPATIENT)
Dept: NEUROLOGY | Facility: CLINIC | Age: 52
End: 2022-01-18

## 2022-02-25 NOTE — PHYSICAL EXAM
[General Appearance - Alert] : alert [General Appearance - In No Acute Distress] : in no acute distress [Sclera] : the sclera and conjunctiva were normal [Extraocular Movements] : extraocular movements were intact [Outer Ear] : the ears and nose were normal in appearance [Neck Appearance] : the appearance of the neck was normal [Auscultation Breath Sounds / Voice Sounds] : lungs were clear to auscultation bilaterally [Heart Rate And Rhythm] : heart rate was normal and rhythm regular [Heart Sounds] : normal S1 and S2 [Heart Sounds Gallop] : no gallops [Murmurs] : no murmurs [Heart Sounds Pericardial Friction Rub] : no pericardial rub [Cervical Lymph Nodes Enlarged Anterior Bilaterally] : anterior cervical [Supraclavicular Lymph Nodes Enlarged Bilaterally] : supraclavicular [No CVA Tenderness] : no ~M costovertebral angle tenderness [No Spinal Tenderness] : no spinal tenderness [Abnormal Walk] : normal gait [Involuntary Movements] : no involuntary movements were seen [] : no rash [No Focal Deficits] : no focal deficits [Oriented To Time, Place, And Person] : oriented to person, place, and time [Affect] : the affect was normal [FreeTextEntry1] : trace edema Adequate: hears normal conversation without difficulty

## 2022-03-04 ENCOUNTER — APPOINTMENT (OUTPATIENT)
Dept: NEPHROLOGY | Facility: CLINIC | Age: 52
End: 2022-03-04
Payer: COMMERCIAL

## 2022-03-04 VITALS — HEART RATE: 77 BPM | DIASTOLIC BLOOD PRESSURE: 74 MMHG | SYSTOLIC BLOOD PRESSURE: 122 MMHG

## 2022-03-04 PROCEDURE — 99214 OFFICE O/P EST MOD 30 MIN: CPT

## 2022-03-07 DIAGNOSIS — E21.3 HYPERPARATHYROIDISM, UNSPECIFIED: ICD-10-CM

## 2022-03-07 LAB
25(OH)D3 SERPL-MCNC: 20.6 NG/ML
ALBUMIN SERPL ELPH-MCNC: 4 G/DL
ANION GAP SERPL CALC-SCNC: 12 MMOL/L
APPEARANCE: CLEAR
BACTERIA: NEGATIVE
BASOPHILS # BLD AUTO: 0.07 K/UL
BASOPHILS NFR BLD AUTO: 1.1 %
BILIRUBIN URINE: NEGATIVE
BLOOD URINE: NEGATIVE
BUN SERPL-MCNC: 35 MG/DL
CALCIUM SERPL-MCNC: 9.1 MG/DL
CALCIUM SERPL-MCNC: 9.1 MG/DL
CHLORIDE SERPL-SCNC: 105 MMOL/L
CHOLEST SERPL-MCNC: 191 MG/DL
CO2 SERPL-SCNC: 26 MMOL/L
COLOR: NORMAL
CREAT SERPL-MCNC: 3.07 MG/DL
CREAT SPEC-SCNC: 95 MG/DL
CREAT/PROT UR: 1.8 RATIO
EGFR: 24 ML/MIN/1.73M2
EOSINOPHIL # BLD AUTO: 0.13 K/UL
EOSINOPHIL NFR BLD AUTO: 2 %
ESTIMATED AVERAGE GLUCOSE: 123 MG/DL
GLUCOSE QUALITATIVE U: NEGATIVE
GLUCOSE SERPL-MCNC: 114 MG/DL
HBA1C MFR BLD HPLC: 5.9 %
HCT VFR BLD CALC: 37.6 %
HDLC SERPL-MCNC: 36 MG/DL
HGB BLD-MCNC: 12.2 G/DL
HYALINE CASTS: 1 /LPF
IMM GRANULOCYTES NFR BLD AUTO: 0.2 %
KETONES URINE: NEGATIVE
LDLC SERPL CALC-MCNC: 126 MG/DL
LEUKOCYTE ESTERASE URINE: NEGATIVE
LYMPHOCYTES # BLD AUTO: 2.06 K/UL
LYMPHOCYTES NFR BLD AUTO: 31.5 %
MAN DIFF?: NORMAL
MCHC RBC-ENTMCNC: 31 PG
MCHC RBC-ENTMCNC: 32.4 GM/DL
MCV RBC AUTO: 95.7 FL
MICROSCOPIC-UA: NORMAL
MONOCYTES # BLD AUTO: 0.58 K/UL
MONOCYTES NFR BLD AUTO: 8.9 %
NEUTROPHILS # BLD AUTO: 3.68 K/UL
NEUTROPHILS NFR BLD AUTO: 56.3 %
NITRITE URINE: NEGATIVE
NONHDLC SERPL-MCNC: 155 MG/DL
PARATHYROID HORMONE INTACT: 392 PG/ML
PH URINE: 6
PHOSPHATE SERPL-MCNC: 4.4 MG/DL
PLATELET # BLD AUTO: 245 K/UL
POTASSIUM SERPL-SCNC: 4.7 MMOL/L
PROT UR-MCNC: 170 MG/DL
PROTEIN URINE: ABNORMAL
RBC # BLD: 3.93 M/UL
RBC # FLD: 13.7 %
RED BLOOD CELLS URINE: 1 /HPF
SODIUM SERPL-SCNC: 143 MMOL/L
SPECIFIC GRAVITY URINE: 1.01
SQUAMOUS EPITHELIAL CELLS: 0 /HPF
TRIGL SERPL-MCNC: 150 MG/DL
URATE SERPL-MCNC: 8.5 MG/DL
UROBILINOGEN URINE: NORMAL
WBC # FLD AUTO: 6.53 K/UL
WHITE BLOOD CELLS URINE: 1 /HPF

## 2022-03-07 RX ORDER — IBUPROFEN 800 MG/1
800 TABLET, FILM COATED ORAL
Qty: 10 | Refills: 0 | Status: DISCONTINUED | COMMUNITY
Start: 2021-10-12

## 2022-03-07 RX ORDER — CHLORHEXIDINE GLUCONATE, 0.12% ORAL RINSE 1.2 MG/ML
0.12 SOLUTION DENTAL
Qty: 473 | Refills: 0 | Status: DISCONTINUED | COMMUNITY
Start: 2021-09-09

## 2022-03-07 RX ORDER — IBUPROFEN 600 MG/1
600 TABLET, FILM COATED ORAL
Qty: 56 | Refills: 0 | Status: DISCONTINUED | COMMUNITY
Start: 2021-11-26

## 2022-03-07 RX ORDER — AMOXICILLIN 500 MG/1
500 TABLET, FILM COATED ORAL
Qty: 21 | Refills: 0 | Status: DISCONTINUED | COMMUNITY
Start: 2021-09-16

## 2022-03-07 NOTE — ASSESSMENT
[FreeTextEntry1] : 52 yo man with CKD 4, HTN, proteinuria, DMT2 and prior h/o STAN\par --HTN- BP well controlled.  Continue current regimen an monitor routinely.  \par Appears that PCP had prescribed amlodipine 5mg (had been on 10mg), okay to keep 5mg dose but to increase back to 10mg if consistently >130/80 at home.  \par Wrist monitor previously correlated well with systolic readings compared to office device, diastolic readings about 15mmHg too high. \par Continue weight loss efforts and healthy lifestyle choices. \par --CKD 3/4- last creat 2.65 (eGFR 31) stable, peak of 3.6 in Jan2021.  Possibly hemodynamic change on reduced torsemide.  electrolytes and volume status good.  recheck today\par To focus on improving  A1C and monitoring BP; limit protein intake; avoiding NSAIDs.\par Good candidate for finerenone, however he wishes to hold off starting additional med since he takes so many already.  To reconsider in future for additional renal protection. Already on GLP1-RA.\par Previously discussed likely need for dialysis and/or kidney transplant in the future and had expressed that he had a few family members who are willing to donate a kidney.\par --proteinuria-- last PCR 2.7g,- continue losartan 50mg daily, defer increasing due to rising Cr.  Continue with BP and A1C optimization.  GN workup negative. As above, consider Karendia, or spironolactone if BP rises.\par --DM- last A1C 7.8%, to schedule follow up with endo. To follow-up optometry examination this year.\par --ALMAS - c/w CPAP and weight loss efforts.\par --HLD - c/w increased statin dose \par --hyperuremia - uric acid previously elevated.  no hx gout attacks, monitor.  To be mindful of symptoms and inform us if any issues.\par --hyperparathyroidism, secondary -  recheck levels.  completed ergocalciferol, add calcitriol if PTH significantly elevated.  \par \par Will call lab results\par f/u 3 months\par \par addendum 3/7: Discussed lab results with pt.  Cr 3.07, eGFR 24 - relatively stable.  Pt agrees to try finerenone, eGFR borderline for initiating, prescribing 10mg.  PTH also elevated.  start calcitriol 0.25mcg every other day.

## 2022-03-07 NOTE — HISTORY OF PRESENT ILLNESS
[FreeTextEntry1] : 52 yo M following up for CKD 4, proteinuria, HTN and DMT2.\par \par Doing well, no complaints.  Continues to be compliant with BP meds and nasal CPAP.  Reports BP controlled at home.  \par Self stopped plavix, only on baby aspirin.  Last year had episodes of left-sided paraesthesias that were lasting longer.  Full workup while admitted and neuro eval was largely negative.  Denies symptoms since then.  \par COVID vaccinated\par Now on Ozempic as monotherapy for DM.  Previous trial of Trulicity not well tolerated.   \par LE swelling controlled on daily torsemide.  Mild swelling reported if sitting at work for long time.\par No NSAID use\par Denies HA, CP, SOB, dizziness. Frothy urine but no flank pain, dysuria or hematuria\par \par PCP: Dr. Corwin Foster\par endo: Dr. Zenaida Macias\par \par 1/7/21: Reports feeling fleeting episodes of left sided numbness lasting up to a minute.  Had echo and duplex carotid US done on 12/31 for PCP.  Full hospital workup 7/2021 negative.\par 12/2019: Optometry revealed mild proliferative changes due to DM, f/u 1 year.\par \par PMH: \par In [facility] May 2019 for accelerated HTN and found to have STAN- with increase in creat to 2.4 from 1.79 baseline. Is aware that he had not been focused on BP and also A1C elevated-

## 2022-03-07 NOTE — PHYSICAL EXAM
[General Appearance - Alert] : alert [General Appearance - In No Acute Distress] : in no acute distress [Sclera] : the sclera and conjunctiva were normal [Extraocular Movements] : extraocular movements were intact [Outer Ear] : the ears and nose were normal in appearance [Neck Appearance] : the appearance of the neck was normal [Auscultation Breath Sounds / Voice Sounds] : lungs were clear to auscultation bilaterally [Heart Rate And Rhythm] : heart rate was normal and rhythm regular [Heart Sounds] : normal S1 and S2 [Heart Sounds Gallop] : no gallops [Murmurs] : no murmurs [Heart Sounds Pericardial Friction Rub] : no pericardial rub [Cervical Lymph Nodes Enlarged Anterior Bilaterally] : anterior cervical [Supraclavicular Lymph Nodes Enlarged Bilaterally] : supraclavicular [No CVA Tenderness] : no ~M costovertebral angle tenderness [No Spinal Tenderness] : no spinal tenderness [Abnormal Walk] : normal gait [Involuntary Movements] : no involuntary movements were seen [] : no rash [No Focal Deficits] : no focal deficits [Oriented To Time, Place, And Person] : oriented to person, place, and time [Affect] : the affect was normal [FreeTextEntry1] : trace to 1+ b/l LE edema

## 2022-03-25 ENCOUNTER — RX RENEWAL (OUTPATIENT)
Age: 52
End: 2022-03-25

## 2022-04-04 ENCOUNTER — APPOINTMENT (OUTPATIENT)
Dept: NEPHROLOGY | Facility: CLINIC | Age: 52
End: 2022-04-04
Payer: COMMERCIAL

## 2022-04-04 VITALS — SYSTOLIC BLOOD PRESSURE: 119 MMHG | HEART RATE: 80 BPM | DIASTOLIC BLOOD PRESSURE: 67 MMHG

## 2022-04-04 PROCEDURE — 99214 OFFICE O/P EST MOD 30 MIN: CPT

## 2022-04-04 RX ORDER — BENZONATATE 100 MG/1
100 CAPSULE ORAL
Qty: 20 | Refills: 0 | Status: DISCONTINUED | COMMUNITY
Start: 2021-11-26

## 2022-04-04 RX ORDER — AMLODIPINE BESYLATE 10 MG/1
10 TABLET ORAL
Qty: 90 | Refills: 0 | Status: DISCONTINUED | COMMUNITY
Start: 2021-01-05

## 2022-04-04 RX ORDER — SEMAGLUTIDE 1.34 MG/ML
2 INJECTION, SOLUTION SUBCUTANEOUS
Refills: 0 | Status: DISCONTINUED | COMMUNITY
Start: 2021-07-27 | End: 2022-04-04

## 2022-04-04 RX ORDER — PEN NEEDLE, DIABETIC 29 G X1/2"
31G X 8 MM NEEDLE, DISPOSABLE MISCELLANEOUS
Qty: 100 | Refills: 0 | Status: DISCONTINUED | COMMUNITY
Start: 2021-11-19

## 2022-04-04 RX ORDER — FLUTICASONE PROPIONATE 50 UG/1
50 SPRAY, METERED NASAL
Qty: 16 | Refills: 0 | Status: DISCONTINUED | COMMUNITY
Start: 2021-11-26

## 2022-04-04 NOTE — ASSESSMENT
[FreeTextEntry1] : 50 yo man with CKD 4, HTN, proteinuria, DMT2 and prior h/o STAN\par --HTN- BP well controlled.  Continue current regimen including sufficient night of sleep with CPAP, and monitor routinely.  \par Appears that PCP had prescribed amlodipine 5mg (had been on 10mg), okay to keep 5mg dose but to increase back to 10mg if consistently >130/80 at home.  \par Wrist monitor previously correlated well with systolic readings compared to office device, diastolic readings about 15mmHg too high. \par Continue weight loss efforts and healthy lifestyle choices. \par --CKD 3/4- last creat 3.07 (eGFR 24) stable, peak of 3.6 in Jan2021.   electrolytes and volume status good.  \par To focus on improving  A1C and monitoring BP; limit protein intake; avoiding NSAIDs.\par Borderline candidate for finerenone, probably okay to take if he is able to obtain.  Already on GLP1-RA.\par Previously discussed likely need for dialysis and/or kidney transplant in the future and had expressed that he had a few family members who are willing to donate a kidney.\par --proteinuria-- PCR 1.8g- continue losartan 50mg daily, defer increasing due to rising Cr.  Continue with BP and A1C optimization.  GN workup negative. As above, try Kerendia if able to get at reasonable cost, or spironolactone if BP rises.\par --DM- A1C 5.9%, amazing.  follow up with endo. To follow-up optometry examination this year.\par --ALMAS - c/w CPAP and weight loss efforts.\par --HLD - c/w increased statin dose \par --hyperuremia - uric acid previously elevated.  no hx gout attacks, monitor.  To be mindful of symptoms and inform us if any issues.\par --hyperparathyroidism, secondary - PTH significantly elevated, to  calcitriol soon.  start taking QOD.\par \par f/u 1 month, to recheck chem and BP.

## 2022-04-04 NOTE — HISTORY OF PRESENT ILLNESS
[FreeTextEntry1] : 52 yo M following up for CKD 4, proteinuria, HTN and DMT2.\par \par Feels well.  Compliant with BP meds and nasal CPAP.   BP controlled at home.  Hx of poor control with heavy working schedule, now more manageable. \par COVID vaccinated\par Ozempic as monotherapy for DM.  Previous trial of Trulicity not well tolerated.   \par LE swelling controlled on daily torsemide.  Mild swelling reported if sitting at work for long time.\par No NSAID use\par Denies HA, CP, SOB, dizziness. Frothy urine but no flank pain, dysuria or hematuria\par \par PCP: Dr. Corwin Foster\par endo: Dr. Zenaida Macias\par \par 1/7/21: Reports feeling fleeting episodes of left sided numbness lasting up to a minute.  Had echo and duplex carotid US done on 12/31 for PCP.  Full hospital workup 7/2021 negative. Self stopped plavix, only on baby aspirin. Denies symptoms since admission. \par 12/2019: Optometry revealed mild proliferative changes due to DM, f/u 1 year.\par \par PMH: \par In [facility] May 2019 for accelerated HTN and found to have STAN- with increase in creat to 2.4 from 1.79 baseline. Is aware that he had not been focused on BP and also A1C elevated-

## 2022-05-04 ENCOUNTER — APPOINTMENT (OUTPATIENT)
Dept: NEPHROLOGY | Facility: CLINIC | Age: 52
End: 2022-05-04
Payer: COMMERCIAL

## 2022-05-04 VITALS — DIASTOLIC BLOOD PRESSURE: 69 MMHG | SYSTOLIC BLOOD PRESSURE: 124 MMHG | HEART RATE: 85 BPM

## 2022-05-04 PROCEDURE — 99214 OFFICE O/P EST MOD 30 MIN: CPT

## 2022-05-05 NOTE — ASSESSMENT
[FreeTextEntry1] : 50 yo man with CKD 4, HTN, proteinuria, DMT2 and prior h/o STAN\par --HTN- BP remains well controlled.  Continue current regimen including sufficient night of sleep with CPAP, and monitor routinely.  \par Appears that PCP had prescribed amlodipine 5mg (had been on 10mg), okay to keep 5mg dose but to increase back to 10mg if consistently >130/80 at home.  \par Wrist monitor previously correlated well with systolic readings compared to office device, diastolic readings about 15mmHg too high. \par Continue weight loss efforts and healthy lifestyle choices. \par --CKD 3/4- last creat 3.07 (eGFR 24) stable, peak of 3.6 in Jan2021.   to repeat today, states he had already done labs before coming to office (however don't see valid order for what he could have done). \par To focus on improving  A1C and monitoring BP; limit protein intake; avoiding NSAIDs.\par Borderline candidate for finerenone given eGFR, probably okay to take if he is able to obtain.  Already on GLP1-RA.\par Previously discussed likely need for dialysis and/or kidney transplant in the future and had expressed that he had a few family members who are willing to donate a kidney.\par --proteinuria-- PCR 1.8g- continue losartan 50mg daily, defer increasing due to rising Cr.  Continue with BP and A1C optimization.  GN workup negative. Consider spironolactone if BP rises, or add and reduce other antihypertensive.  eGFR borderline for finerenone.\par --DM- last A1C 5.9%, very good. follow up with endo. To follow-up optometry examination this year.\par --ALMAS - c/w CPAP and weight loss efforts.\par --HLD - c/w increased statin dose \par --hyperuremia - uric acid previously elevated.  no hx gout attacks, monitor.  To be mindful of symptoms and inform us if any issues.\par --hyperparathyroidism, secondary - PTH significantly elevated, to  calcitriol soon.  start taking QOD.\par \par f/u 2-3 months\par \par addendum 5/5: reviewed labs, mostly stable.  Instructed pt to ensure he takes calcitriol.  May increase frequency later based on response.  Also could benefit from iron supplementation.  Sent script to start with one daily.

## 2022-05-05 NOTE — HISTORY OF PRESENT ILLNESS
[FreeTextEntry1] : 52 yo M following up for CKD 4, proteinuria, HTN and DMT2.\par \par Has been well, compliant with BP meds and nasal CPAP.   BP controlled at home.  Hx of poor control with heavy working schedule, now more manageable. \par Ozempic as monotherapy for DM.  Previous trial of Trulicity not well tolerated.   \par LE swelling controlled on daily torsemide.  Mild swelling reported if sitting at work for long time.\par No NSAID use\par Denies HA, CP, SOB, dizziness. Frothy urine but no flank pain, dysuria or hematuria\par \par COVID vaccinated\par \par PCP: Dr. Corwin Foster\par endo: Dr. Zenaida Macias\par \par 1/7/21: Reports feeling fleeting episodes of left sided numbness lasting up to a minute.  Had echo and duplex carotid US done on 12/31 for PCP.  Full hospital workup 7/2021 negative. Self stopped plavix, only on baby aspirin. Denies symptoms since admission. \par 12/2019: Optometry revealed mild proliferative changes due to DM, f/u 1 year.\par \par PMH: \par In [facility] May 2019 for accelerated HTN and found to have STAN- with increase in creat to 2.4 from 1.79 baseline. Is aware that he had not been focused on BP and also A1C elevated-

## 2022-05-12 LAB
25(OH)D3 SERPL-MCNC: 18.5 NG/ML
ALBUMIN SERPL ELPH-MCNC: 3.6 G/DL
ALP BLD-CCNC: 127 U/L
ALT SERPL-CCNC: 12 U/L
ANION GAP SERPL CALC-SCNC: 12 MMOL/L
APPEARANCE: CLEAR
AST SERPL-CCNC: 14 U/L
BACTERIA: NEGATIVE
BASOPHILS # BLD AUTO: 0.07 K/UL
BASOPHILS NFR BLD AUTO: 1.1 %
BILIRUB SERPL-MCNC: 0.4 MG/DL
BILIRUBIN URINE: NEGATIVE
BLOOD URINE: NEGATIVE
BUN SERPL-MCNC: 28 MG/DL
CALCIUM SERPL-MCNC: 8.8 MG/DL
CALCIUM SERPL-MCNC: 8.8 MG/DL
CHLORIDE SERPL-SCNC: 107 MMOL/L
CHOLEST SERPL-MCNC: 181 MG/DL
CO2 SERPL-SCNC: 24 MMOL/L
COLOR: NORMAL
CREAT SERPL-MCNC: 3.04 MG/DL
CREAT SPEC-SCNC: 122 MG/DL
CREAT/PROT UR: 2.6 RATIO
CYSTATIN C SERPL-MCNC: 2.3 MG/L
EGFR: 24 ML/MIN/1.73M2
EOSINOPHIL # BLD AUTO: 0.22 K/UL
EOSINOPHIL NFR BLD AUTO: 3.5 %
ESTIMATED AVERAGE GLUCOSE: 123 MG/DL
GFR/BSA.PRED SERPLBLD CYS-BASED-ARV: 27 ML/MIN/1.73M2
GLUCOSE QUALITATIVE U: NEGATIVE
GLUCOSE SERPL-MCNC: 126 MG/DL
HBA1C MFR BLD HPLC: 5.9 %
HCT VFR BLD CALC: 38.1 %
HDLC SERPL-MCNC: 33 MG/DL
HGB BLD-MCNC: 12.8 G/DL
HYALINE CASTS: 4 /LPF
IMM GRANULOCYTES NFR BLD AUTO: 0.2 %
IRON SATN MFR SERPL: 18 %
IRON SERPL-MCNC: 42 UG/DL
KETONES URINE: NEGATIVE
LDLC SERPL CALC-MCNC: 125 MG/DL
LEUKOCYTE ESTERASE URINE: NEGATIVE
LYMPHOCYTES # BLD AUTO: 1.61 K/UL
LYMPHOCYTES NFR BLD AUTO: 25.6 %
MAN DIFF?: NORMAL
MCHC RBC-ENTMCNC: 30.4 PG
MCHC RBC-ENTMCNC: 33.6 GM/DL
MCV RBC AUTO: 90.5 FL
MICROSCOPIC-UA: NORMAL
MONOCYTES # BLD AUTO: 0.59 K/UL
MONOCYTES NFR BLD AUTO: 9.4 %
NEUTROPHILS # BLD AUTO: 3.8 K/UL
NEUTROPHILS NFR BLD AUTO: 60.2 %
NITRITE URINE: NEGATIVE
NONHDLC SERPL-MCNC: 147 MG/DL
PARATHYROID HORMONE INTACT: 357 PG/ML
PH URINE: 6
PHOSPHATE SERPL-MCNC: 3.4 MG/DL
PLATELET # BLD AUTO: 255 K/UL
POTASSIUM SERPL-SCNC: 4.2 MMOL/L
PROT SERPL-MCNC: 6.3 G/DL
PROT UR-MCNC: 314 MG/DL
PROTEIN URINE: ABNORMAL
RBC # BLD: 4.21 M/UL
RBC # FLD: 12.3 %
RED BLOOD CELLS URINE: 2 /HPF
SODIUM SERPL-SCNC: 143 MMOL/L
SPECIFIC GRAVITY URINE: 1.01
SQUAMOUS EPITHELIAL CELLS: 1 /HPF
TIBC SERPL-MCNC: 240 UG/DL
TRIGL SERPL-MCNC: 111 MG/DL
UIBC SERPL-MCNC: 198 UG/DL
UROBILINOGEN URINE: NORMAL
WBC # FLD AUTO: 6.3 K/UL
WHITE BLOOD CELLS URINE: 1 /HPF

## 2022-05-31 ENCOUNTER — EMERGENCY (EMERGENCY)
Facility: HOSPITAL | Age: 52
LOS: 1 days | Discharge: HOME | End: 2022-05-31
Attending: EMERGENCY MEDICINE | Admitting: EMERGENCY MEDICINE
Payer: COMMERCIAL

## 2022-05-31 VITALS
HEART RATE: 80 BPM | HEIGHT: 69 IN | SYSTOLIC BLOOD PRESSURE: 124 MMHG | DIASTOLIC BLOOD PRESSURE: 70 MMHG | RESPIRATION RATE: 18 BRPM | OXYGEN SATURATION: 96 % | TEMPERATURE: 98 F

## 2022-05-31 VITALS
RESPIRATION RATE: 18 BRPM | HEART RATE: 81 BPM | SYSTOLIC BLOOD PRESSURE: 141 MMHG | DIASTOLIC BLOOD PRESSURE: 79 MMHG | OXYGEN SATURATION: 97 %

## 2022-05-31 DIAGNOSIS — E11.22 TYPE 2 DIABETES MELLITUS WITH DIABETIC CHRONIC KIDNEY DISEASE: ICD-10-CM

## 2022-05-31 DIAGNOSIS — I25.2 OLD MYOCARDIAL INFARCTION: ICD-10-CM

## 2022-05-31 DIAGNOSIS — R55 SYNCOPE AND COLLAPSE: ICD-10-CM

## 2022-05-31 DIAGNOSIS — R53.1 WEAKNESS: ICD-10-CM

## 2022-05-31 DIAGNOSIS — R42 DIZZINESS AND GIDDINESS: ICD-10-CM

## 2022-05-31 DIAGNOSIS — E78.5 HYPERLIPIDEMIA, UNSPECIFIED: ICD-10-CM

## 2022-05-31 DIAGNOSIS — Z90.49 ACQUIRED ABSENCE OF OTHER SPECIFIED PARTS OF DIGESTIVE TRACT: Chronic | ICD-10-CM

## 2022-05-31 DIAGNOSIS — Z20.822 CONTACT WITH AND (SUSPECTED) EXPOSURE TO COVID-19: ICD-10-CM

## 2022-05-31 DIAGNOSIS — Z79.02 LONG TERM (CURRENT) USE OF ANTITHROMBOTICS/ANTIPLATELETS: ICD-10-CM

## 2022-05-31 DIAGNOSIS — I12.9 HYPERTENSIVE CHRONIC KIDNEY DISEASE WITH STAGE 1 THROUGH STAGE 4 CHRONIC KIDNEY DISEASE, OR UNSPECIFIED CHRONIC KIDNEY DISEASE: ICD-10-CM

## 2022-05-31 DIAGNOSIS — N18.30 CHRONIC KIDNEY DISEASE, STAGE 3 UNSPECIFIED: ICD-10-CM

## 2022-05-31 DIAGNOSIS — Z79.82 LONG TERM (CURRENT) USE OF ASPIRIN: ICD-10-CM

## 2022-05-31 DIAGNOSIS — Z90.49 ACQUIRED ABSENCE OF OTHER SPECIFIED PARTS OF DIGESTIVE TRACT: ICD-10-CM

## 2022-05-31 LAB
ALBUMIN SERPL ELPH-MCNC: 3.5 G/DL — SIGNIFICANT CHANGE UP (ref 3.3–5)
ALP SERPL-CCNC: 121 U/L — HIGH (ref 40–120)
ALT FLD-CCNC: 6 U/L — LOW (ref 10–45)
ANION GAP SERPL CALC-SCNC: 9 MMOL/L — SIGNIFICANT CHANGE UP (ref 5–17)
APTT BLD: 32.3 SEC — SIGNIFICANT CHANGE UP (ref 27.5–35.5)
AST SERPL-CCNC: 11 U/L — SIGNIFICANT CHANGE UP (ref 10–40)
BASOPHILS # BLD AUTO: 0.06 K/UL — SIGNIFICANT CHANGE UP (ref 0–0.2)
BASOPHILS NFR BLD AUTO: 0.7 % — SIGNIFICANT CHANGE UP (ref 0–2)
BILIRUB SERPL-MCNC: 0.4 MG/DL — SIGNIFICANT CHANGE UP (ref 0.2–1.2)
BUN SERPL-MCNC: 25 MG/DL — HIGH (ref 7–23)
CALCIUM SERPL-MCNC: 9 MG/DL — SIGNIFICANT CHANGE UP (ref 8.4–10.5)
CHLORIDE SERPL-SCNC: 106 MMOL/L — SIGNIFICANT CHANGE UP (ref 96–108)
CO2 SERPL-SCNC: 29 MMOL/L — SIGNIFICANT CHANGE UP (ref 22–31)
CREAT SERPL-MCNC: 3.23 MG/DL — HIGH (ref 0.5–1.3)
EGFR: 22 ML/MIN/1.73M2 — LOW
EOSINOPHIL # BLD AUTO: 0.13 K/UL — SIGNIFICANT CHANGE UP (ref 0–0.5)
EOSINOPHIL NFR BLD AUTO: 1.6 % — SIGNIFICANT CHANGE UP (ref 0–6)
GLUCOSE SERPL-MCNC: 149 MG/DL — HIGH (ref 70–99)
HCT VFR BLD CALC: 36.6 % — LOW (ref 39–50)
HGB BLD-MCNC: 12.5 G/DL — LOW (ref 13–17)
IMM GRANULOCYTES NFR BLD AUTO: 0.2 % — SIGNIFICANT CHANGE UP (ref 0–1.5)
INR BLD: 1.04 — SIGNIFICANT CHANGE UP (ref 0.88–1.16)
LYMPHOCYTES # BLD AUTO: 1.2 K/UL — SIGNIFICANT CHANGE UP (ref 1–3.3)
LYMPHOCYTES # BLD AUTO: 14.6 % — SIGNIFICANT CHANGE UP (ref 13–44)
MCHC RBC-ENTMCNC: 31.3 PG — SIGNIFICANT CHANGE UP (ref 27–34)
MCHC RBC-ENTMCNC: 34.2 GM/DL — SIGNIFICANT CHANGE UP (ref 32–36)
MCV RBC AUTO: 91.7 FL — SIGNIFICANT CHANGE UP (ref 80–100)
MONOCYTES # BLD AUTO: 0.48 K/UL — SIGNIFICANT CHANGE UP (ref 0–0.9)
MONOCYTES NFR BLD AUTO: 5.8 % — SIGNIFICANT CHANGE UP (ref 2–14)
NEUTROPHILS # BLD AUTO: 6.33 K/UL — SIGNIFICANT CHANGE UP (ref 1.8–7.4)
NEUTROPHILS NFR BLD AUTO: 77.1 % — HIGH (ref 43–77)
NRBC # BLD: 0 /100 WBCS — SIGNIFICANT CHANGE UP (ref 0–0)
PLATELET # BLD AUTO: 244 K/UL — SIGNIFICANT CHANGE UP (ref 150–400)
POTASSIUM SERPL-MCNC: 4.5 MMOL/L — SIGNIFICANT CHANGE UP (ref 3.5–5.3)
POTASSIUM SERPL-SCNC: 4.5 MMOL/L — SIGNIFICANT CHANGE UP (ref 3.5–5.3)
PROT SERPL-MCNC: 6.5 G/DL — SIGNIFICANT CHANGE UP (ref 6–8.3)
PROTHROM AB SERPL-ACNC: 12.4 SEC — SIGNIFICANT CHANGE UP (ref 10.5–13.4)
RBC # BLD: 3.99 M/UL — LOW (ref 4.2–5.8)
RBC # FLD: 12.3 % — SIGNIFICANT CHANGE UP (ref 10.3–14.5)
SARS-COV-2 RNA SPEC QL NAA+PROBE: NEGATIVE — SIGNIFICANT CHANGE UP
SODIUM SERPL-SCNC: 144 MMOL/L — SIGNIFICANT CHANGE UP (ref 135–145)
WBC # BLD: 8.22 K/UL — SIGNIFICANT CHANGE UP (ref 3.8–10.5)
WBC # FLD AUTO: 8.22 K/UL — SIGNIFICANT CHANGE UP (ref 3.8–10.5)

## 2022-05-31 PROCEDURE — 85730 THROMBOPLASTIN TIME PARTIAL: CPT

## 2022-05-31 PROCEDURE — 93005 ELECTROCARDIOGRAM TRACING: CPT

## 2022-05-31 PROCEDURE — 82962 GLUCOSE BLOOD TEST: CPT

## 2022-05-31 PROCEDURE — 36415 COLL VENOUS BLD VENIPUNCTURE: CPT

## 2022-05-31 PROCEDURE — 80053 COMPREHEN METABOLIC PANEL: CPT

## 2022-05-31 PROCEDURE — 85025 COMPLETE CBC W/AUTO DIFF WBC: CPT

## 2022-05-31 PROCEDURE — 99285 EMERGENCY DEPT VISIT HI MDM: CPT | Mod: 25

## 2022-05-31 PROCEDURE — 87635 SARS-COV-2 COVID-19 AMP PRB: CPT

## 2022-05-31 PROCEDURE — 99285 EMERGENCY DEPT VISIT HI MDM: CPT

## 2022-05-31 PROCEDURE — 70450 CT HEAD/BRAIN W/O DYE: CPT | Mod: MC

## 2022-05-31 PROCEDURE — 70450 CT HEAD/BRAIN W/O DYE: CPT | Mod: 26,MC

## 2022-05-31 PROCEDURE — 85610 PROTHROMBIN TIME: CPT

## 2022-05-31 RX ORDER — SODIUM CHLORIDE 9 MG/ML
1000 INJECTION INTRAMUSCULAR; INTRAVENOUS; SUBCUTANEOUS ONCE
Refills: 0 | Status: COMPLETED | OUTPATIENT
Start: 2022-05-31 | End: 2022-05-31

## 2022-05-31 RX ADMIN — SODIUM CHLORIDE 1000 MILLILITER(S): 9 INJECTION INTRAMUSCULAR; INTRAVENOUS; SUBCUTANEOUS at 11:46

## 2022-05-31 NOTE — ED ADULT NURSE NOTE - OTHER COMPLAINTS
patient BIBEMS from work c/o acute onset dizziness and lightheadedness x 0712 today--reports generalized weakness and HA-- denies unilateral numbness-- no slurred speech noted

## 2022-05-31 NOTE — ED PROVIDER NOTE - CLINICAL SUMMARY MEDICAL DECISION MAKING FREE TEXT BOX
50 y/o M PMHx CKD, DM, HTN, presenting with lightheadedness and feeling generally weak. No focal deficits. No clinical indications for stroke or vertigo at this time. Will check basic labs, EKG, CT head no contrast, 1L fluids. 52 y/o M PMHx CKD, DM, HTN, presenting with lightheadedness and feeling generally weak. No focal deficits. No clinical indications for stroke or vertigo at this time. Will check basic labs, EKG, CT head no contrast, 1L fluids.    Pt feeling better in ED after fluids.  Imaging neg for acute findings.  Understands discharge instructions and return precautions.

## 2022-05-31 NOTE — ED ADULT NURSE NOTE - OBJECTIVE STATEMENT
pt c/o dizziness /lightheaded while at work this am , no focal weakness , numbness, CP, SOB or fever

## 2022-05-31 NOTE — ED PROVIDER NOTE - CONSTITUTIONAL, MLM
normal... Generally weak appearing, awake, alert, oriented to person, place, time/situation and in no apparent distress.

## 2022-05-31 NOTE — ED ADULT TRIAGE NOTE - OTHER COMPLAINTS
patient BIBEMS from work c/o acute onset dizziness and lightheadedness x 0782 today--reports generalized weakness and HA-- denies unilateral numbness-- no slurred speech noted

## 2022-05-31 NOTE — ED PROVIDER NOTE - OBJECTIVE STATEMENT
50 y/o M PMHx DM, HTN, HLD, CKD (follows with Dr. Faust), presents with dizziness and lightheadedness. Pt was at baseline today upon waking up and was able to take his children to school and go to work fine. While at work, he began to feel generally weak and unwell with symptoms of lightheadedness and dizziness. Pt had an episode of pre-syncope but no LOC. He felt better after drinking some juice. 911 was called to bring Pt to ED for further evaluation. Glucose 180. In ED, Pt denies rom spinning sensation, blurry vision, focal weakness, numbness, paresthesias, headache, neck pain, chest pain, SOB, n/v. States he feels okay, no fevers or infectious symptoms. Last creat 3 on 5/4.

## 2022-05-31 NOTE — ED PROVIDER NOTE - PATIENT PORTAL LINK FT
You can access the FollowMyHealth Patient Portal offered by [facility] by registering at the following website: [email or web address]. By joining [employer]’s FollowMyHealth portal, you will also be able to view your health information using other applications (apps) compatible with our system.

## 2022-08-08 ENCOUNTER — APPOINTMENT (OUTPATIENT)
Dept: NEPHROLOGY | Facility: CLINIC | Age: 52
End: 2022-08-08

## 2022-10-11 ENCOUNTER — APPOINTMENT (OUTPATIENT)
Dept: NEPHROLOGY | Facility: CLINIC | Age: 52
End: 2022-10-11

## 2022-10-26 NOTE — ED ADULT NURSE NOTE - PAIN RATING/NUMBER SCALE (0-10): ACTIVITY
Migraine for 3 days now.  Missed school now needs a dr note.   What can kylah try?   Call [phone]    She is aware Sorin is not available today     3

## 2023-01-10 NOTE — PROGRESS NOTE ADULT - PROBLEM/PLAN-4
well developed, well nourished , in no acute distress , ambulating without difficulty , normal communication ability
DISPLAY PLAN FREE TEXT

## 2023-01-11 NOTE — PATIENT PROFILE ADULT - NSPROPTRIGHTNOTIFY_GEN_A_NUR
declines You can access the FollowMyHealth Patient Portal offered by [facility] by registering at the following website: [email or web address]. By joining [employer]’s FollowMyHealth portal, you will also be able to view your health information using other applications (apps) compatible with our system.

## 2023-03-07 ENCOUNTER — APPOINTMENT (OUTPATIENT)
Dept: NEPHROLOGY | Facility: CLINIC | Age: 53
End: 2023-03-07
Payer: COMMERCIAL

## 2023-03-07 VITALS
WEIGHT: 202 LBS | HEART RATE: 80 BPM | BODY MASS INDEX: 28.98 KG/M2 | DIASTOLIC BLOOD PRESSURE: 76 MMHG | SYSTOLIC BLOOD PRESSURE: 130 MMHG

## 2023-03-07 DIAGNOSIS — N17.9 ACUTE KIDNEY FAILURE, UNSPECIFIED: ICD-10-CM

## 2023-03-07 LAB
25(OH)D3 SERPL-MCNC: 11.3 NG/ML
ALBUMIN SERPL ELPH-MCNC: 3.5 G/DL
ANION GAP SERPL CALC-SCNC: 13 MMOL/L
BASOPHILS # BLD AUTO: 0.05 K/UL
BASOPHILS NFR BLD AUTO: 0.7 %
BUN SERPL-MCNC: 40 MG/DL
CALCIUM SERPL-MCNC: 8.7 MG/DL
CALCIUM SERPL-MCNC: 8.7 MG/DL
CHLORIDE SERPL-SCNC: 107 MMOL/L
CHOLEST SERPL-MCNC: 171 MG/DL
CO2 SERPL-SCNC: 25 MMOL/L
CREAT SERPL-MCNC: 3.96 MG/DL
EGFR: 17 ML/MIN/1.73M2
EOSINOPHIL # BLD AUTO: 0.23 K/UL
EOSINOPHIL NFR BLD AUTO: 3.1 %
ESTIMATED AVERAGE GLUCOSE: 126 MG/DL
GLUCOSE SERPL-MCNC: 133 MG/DL
HBA1C MFR BLD HPLC: 6 %
HCT VFR BLD CALC: 36.3 %
HDLC SERPL-MCNC: 34 MG/DL
HGB BLD-MCNC: 11.9 G/DL
IMM GRANULOCYTES NFR BLD AUTO: 0.4 %
IRON SATN MFR SERPL: 20 %
IRON SERPL-MCNC: 50 UG/DL
LDLC SERPL CALC-MCNC: 113 MG/DL
LYMPHOCYTES # BLD AUTO: 2 K/UL
LYMPHOCYTES NFR BLD AUTO: 26.5 %
MAN DIFF?: NORMAL
MCHC RBC-ENTMCNC: 30.7 PG
MCHC RBC-ENTMCNC: 32.8 GM/DL
MCV RBC AUTO: 93.8 FL
MONOCYTES # BLD AUTO: 0.61 K/UL
MONOCYTES NFR BLD AUTO: 8.1 %
NEUTROPHILS # BLD AUTO: 4.62 K/UL
NEUTROPHILS NFR BLD AUTO: 61.2 %
NONHDLC SERPL-MCNC: 137 MG/DL
PARATHYROID HORMONE INTACT: 561 PG/ML
PHOSPHATE SERPL-MCNC: 4.3 MG/DL
PLATELET # BLD AUTO: 238 K/UL
POTASSIUM SERPL-SCNC: 4.5 MMOL/L
RBC # BLD: 3.87 M/UL
RBC # FLD: 12.9 %
SODIUM SERPL-SCNC: 146 MMOL/L
TIBC SERPL-MCNC: 253 UG/DL
TRIGL SERPL-MCNC: 119 MG/DL
UIBC SERPL-MCNC: 203 UG/DL
URATE SERPL-MCNC: 8.3 MG/DL
WBC # FLD AUTO: 7.54 K/UL

## 2023-03-07 PROCEDURE — 99215 OFFICE O/P EST HI 40 MIN: CPT

## 2023-03-07 RX ORDER — CHLORHEXIDINE GLUCONATE 4 %
325 (65 FE) LIQUID (ML) TOPICAL DAILY
Qty: 90 | Refills: 3 | Status: DISCONTINUED | COMMUNITY
Start: 2022-05-05 | End: 2023-03-07

## 2023-03-07 RX ORDER — CLOPIDOGREL BISULFATE 75 MG/1
75 TABLET, FILM COATED ORAL DAILY
Qty: 90 | Refills: 3 | Status: DISCONTINUED | COMMUNITY
Start: 2021-07-27 | End: 2023-03-07

## 2023-03-07 RX ORDER — FINERENONE 10 MG/1
10 TABLET, FILM COATED ORAL
Qty: 90 | Refills: 1 | Status: DISCONTINUED | COMMUNITY
Start: 2022-03-07 | End: 2023-03-07

## 2023-03-07 RX ORDER — LEVETIRACETAM 500 MG/1
500 TABLET, FILM COATED ORAL TWICE DAILY
Qty: 60 | Refills: 3 | Status: DISCONTINUED | COMMUNITY
Start: 2021-11-23 | End: 2023-03-07

## 2023-03-07 NOTE — HISTORY OF PRESENT ILLNESS
[FreeTextEntry1] : 51 yo man with cKD 4, proteinuria, HTN and DMT2, here for follow up evaluation\par feeling very stressed and overwhelmed at work. Feels that he can't focus on tasks at hand. Trouble coping. \par No thoughts of harming himself or others.\par Feels that his BP and glycemic control have been good.\par continues with nasal CPAP.   \par home BP readings at goal- wrist cuff\par continues on only Ozempic for DM\par never started calcitriol\par No NSAID use\par Denies HA, CP, SOB, dizziness. Frothy urine but no flank pain, dysuria or hematuria\par \par \par \par PCP: Dr. Corwin Foster\par endo: Dr. Zenaida Macias\par \par 1/7/21: Reports feeling fleeting episodes of left sided numbness lasting up to a minute.  Had echo and duplex carotid US done on 12/31 for PCP.  Full hospital workup 7/2021 negative. Self stopped plavix, only on baby aspirin. Denies symptoms since admission. \par 12/2019: Optometry revealed mild proliferative changes due to DM, f/u 1 year.\par \par PMH: \par In [facility] May 2019 for accelerated HTN and found to have STAN- with increase in creat to 2.4 from 1.79 baseline. Is aware that he had not been focused on BP and also A1C elevated-

## 2023-03-07 NOTE — QUALITY MEASURES
[PTH level measured within last] : patient's PTH level measured within the last 12 months [Patient has proteinuria greater than] : patient has proteinuria greater than 1 gm (spot urine/protein creatinine ratio or a 24hr collection) [Yes] : patient is an appropriate candidate for kidney transplantation evaluation [Patient has started or completed the] : patient has started or completed the process of evaluation for renal transplant

## 2023-03-07 NOTE — PHYSICAL EXAM
[General Appearance - Alert] : alert [General Appearance - In No Acute Distress] : in no acute distress [] : no respiratory distress [Auscultation Breath Sounds / Voice Sounds] : lungs were clear to auscultation bilaterally [Heart Rate And Rhythm] : heart rate was normal and rhythm regular [Heart Sounds] : normal S1 and S2 [Heart Sounds Gallop] : no gallops [Murmurs] : no murmurs [Heart Sounds Pericardial Friction Rub] : no pericardial rub [Edema] : there was no peripheral edema [No CVA Tenderness] : no ~M costovertebral angle tenderness [No Spinal Tenderness] : no spinal tenderness [Oriented To Time, Place, And Person] : oriented to person, place, and time [Affect] : the affect was normal

## 2023-03-07 NOTE — ASSESSMENT
[FreeTextEntry1] : all lab data was reviewed with patient in detail from 3/7/2023\par all meds renewed now\par 51 yo man with CKD 4, new rise in creat, HTN, proteinuria, DMT2 and prior h/o STAN\par -acute stress reaction- advised to not go to work for next few weeks- keep me apprised of his symptoms/feelings. Should make appt to see psych if feels that he is not improving\par He verbalized that he may need to change his job back to his former position- thinks this may help him.\par he will discuss.\par --HTN- BP at goal- c/w present regimen\par Wrist monitor correlated with office device here today\par resume weight loss efforts and healthy lifestyle choices. \par --CKD 4- with acute rise in creat- progression vs. stan-  not uremic. \par repeat 1 week- may need to dc or reduce losartan\par should complete tx evaluation- aware of probable need for RRT in future. prior stated that a few family members who are willing to donate a kidney.\par  monitoring BP; limit protein intake; avoiding NSAIDs.\par --proteinuria--continue losartan. defer other agents as CKD is advanced \par --DM-  A1C controlled- follow up with endo. \par --ALMAS - c/w CPAP and weight loss efforts.\par --HLD - c/w statin LDL above goal- will monitor- may need increase statin- working on weight loss\par --hyperuremia - uric acid now controlled\par --hyperparathyroidism, secondary - PTH  561 with low vit d- calcitriol ordered\par \par \par telephonic OV 2-4 weeks to check emotional status\par f/u OV office 2 months

## 2023-03-08 LAB
APPEARANCE: CLEAR
BACTERIA: NEGATIVE
BILIRUBIN URINE: NEGATIVE
BLOOD URINE: NEGATIVE
COLOR: NORMAL
CREAT SPEC-SCNC: 137 MG/DL
CREAT/PROT UR: 2.8 RATIO
GLUCOSE QUALITATIVE U: NORMAL
HYALINE CASTS: 3 /LPF
KETONES URINE: NEGATIVE
LEUKOCYTE ESTERASE URINE: NEGATIVE
MICROSCOPIC-UA: NORMAL
NITRITE URINE: NEGATIVE
PH URINE: 6
PROT UR-MCNC: 376 MG/DL
PROTEIN URINE: ABNORMAL
RED BLOOD CELLS URINE: 3 /HPF
SPECIFIC GRAVITY URINE: 1.02
SQUAMOUS EPITHELIAL CELLS: 1 /HPF
UROBILINOGEN URINE: NORMAL
WHITE BLOOD CELLS URINE: 3 /HPF

## 2023-03-28 ENCOUNTER — APPOINTMENT (OUTPATIENT)
Dept: NEPHROLOGY | Facility: CLINIC | Age: 53
End: 2023-03-28
Payer: COMMERCIAL

## 2023-03-28 LAB
ALBUMIN SERPL ELPH-MCNC: 3.7 G/DL
ANION GAP SERPL CALC-SCNC: 13 MMOL/L
BUN SERPL-MCNC: 36 MG/DL
CALCIUM SERPL-MCNC: 9.3 MG/DL
CHLORIDE SERPL-SCNC: 106 MMOL/L
CO2 SERPL-SCNC: 26 MMOL/L
CREAT SERPL-MCNC: 3.92 MG/DL
EGFR: 18 ML/MIN/1.73M2
GLUCOSE SERPL-MCNC: 162 MG/DL
PHOSPHATE SERPL-MCNC: 4.3 MG/DL
POTASSIUM SERPL-SCNC: 4.7 MMOL/L
SODIUM SERPL-SCNC: 144 MMOL/L

## 2023-03-28 PROCEDURE — 99443: CPT

## 2023-03-28 NOTE — ASSESSMENT
[FreeTextEntry1] : all lab data was reviewed with patient in detail from 3/15/2023\par 51 yo man with CKD 4, anxiety with depression, HTN, proteinuria, DMT2 and prior h/o STAN\par -acute stress reaction-  \par seems a bit better but does not think he can manage outside home or focus on tasks at hand as yet\par will continue with finding/using tools tools to help him cope (apps- Calm, headspace etc.) -Advised to see therapist.\par needs to resume regular work schedule if he can- advised that he should return to work for April 17, 2023- if he feels he cannot yet manage by then, will need to have a psychiatrist or psychotherapist advised us and his work schedule.\par Letter written for work\par he again verbalized that he may need to change his job back to his former position- thinks this may help him.\par he will continue considering and discussing with family\par --HTN-  c/w present regimen; prior BPs at goal\par reminded to forward readings to office\par -CKD 4- creat stable at 3.96- so prior bump up without improvement suggestive of progression of disease.\par advised to go for transplant evaluation- will do so at [facility]\par if unable to secure a living donor may need to prepare for dialysis- will discuss further next OV\par not uremic\par monitoring BP; limit protein intake; avoiding NSAIDs.\par --proteinuria--continue losartan. defer other agents as CKD is advanced \par --DM-  A1C controlled- follow up with endo. \par --ALMAS - c/w CPAP and weight loss efforts.\par --HLD - c/w statin LDL above goal prior determination- will need to repeat with next OV\par --hyperuremia - uric acid controlled\par --hyperparathyroidism, secondary - c/w calcitriol\par \par \par f/u OV office May, 2023

## 2023-03-28 NOTE — HISTORY OF PRESENT ILLNESS
[FreeTextEntry1] : 53 yo man for f/u evaluation of  CKD 4, proteinuria, HTN and DMT2.\par Bump up in creat last OV\par still feeling stressed, but feels that he is starting to cope a bit better. \par BP and glycemic control have been good.\par continues with nasal CPAP.   \par No NSAID use\par Denies HA, CP, SOB, dizziness.\par Frothy urine but no flank pain, dysuria or hematuria\par \par PCP: Dr. Corwin Foster\par endo: Dr. Zenaida Macias\par \par 1/7/21: Reports feeling fleeting episodes of left sided numbness lasting up to a minute.  Had echo and duplex carotid US done on 12/31 for PCP.  Full hospital workup 7/2021 negative. Self stopped plavix, only on baby aspirin. Denies symptoms since admission. \par 12/2019: Optometry revealed mild proliferative changes due to DM, f/u 1 year.\par \par PMH: \par In [facility] May 2019 for accelerated HTN and found to have STAN- with increase in creat to 2.4 from 1.79 baseline. Is aware that he had not been focused on BP and also A1C elevated-

## 2023-03-28 NOTE — REASON FOR VISIT
[Home] : at home, [unfilled] , at the time of the visit. [Medical Office: ([facility])___] : at the medical office located in  [Verbal consent obtained from patient] : the patient, [unfilled] [Follow-Up] : a follow-up visit [FreeTextEntry1] : for HTN, DM type 2, CKD 4

## 2023-04-27 NOTE — ED ADULT NURSE NOTE - NS ED NOTE  TALK SOMEONE YN
-- DO NOT REPLY / DO NOT REPLY ALL --  -- Message is from [facility] Center [employer] ([facility]) --    General Patient Message:   Patient would like to speak with the nurse regarding sedation medication for a mri.     Caller Information       Type Contact Phone/Fax    04/27/2023 08:20 AM CDT Phone (Incoming) Janine Cheema (Self) [phone] (M)        Alternative phone number: no    Can a detailed message be left? Yes    Message Turnaround: [place]:    Refer to site's KB page for routing instructions    Please give this turnaround time to the caller:   \"You can expect to receive a response 1-3 business days after your provider's clinical team reviews the message\"               No

## 2023-05-17 NOTE — DISCHARGE NOTE NURSING/CASE MANAGEMENT/SOCIAL WORK - NSDCPEFALRISK_GEN_ALL_CORE
([facility] [address] 6/4/2018    Pneumonia     PONV (postoperative nausea and vomiting)     nausea    Spinal abscess (HCC) 3/2014    epidural abscess    Spinal epidural abscess 3/13/2014    Type II diabetes mellitus, uncontrolled ([facility] [address] 08/13/2014       Past Surgical History:    Past Surgical History:   Procedure Laterality Date    BACK SURGERY  3/2014    DEBRIDEMENT  3/12/14    extensive debridement of bone/muscle and paraspinal empyema    FOOT DEBRIDEMENT Left 9/8/2020    LEFT FOOT DEBRIDEMENT INCISION AND DRAINAGE performed by Анна Tavarez DPM at [address] [facility] [address] TOE SURGERY Left 8/28/2020    ON THE LEFT: [address] [facility] [address], WOUND CLOSURE, FLEXOR TENOTOMY 4TH AND 5TH DIGITS, TENOTOMY AND CAPSULOTOMY 2ND DIGIT performed by Анна Tavarez DPM at [facility]  6/15/1999    complete-think right ovary in.    NASAL SEPTUM SURGERY      SLEEVE GASTRECTOMY  04/02/2019    ROBOTIC ASSISTED LAPAROSCOPIC SLEEVE GASTRECTOMY, LAPAROSCOPIC REDUCIBLE INCISIONAL HERNIA REPAIR     SLEEVE GASTRECTOMY N/A 4/2/2019    ROBOTIC ASSISTED LAPAROSCOPIC SLEEVE GASTRECTOMY, LAPAROSCOPIC REDUCIBLE INCISIONAL HERNIA REPAIR performed by Noy Prado DO at [address] [facility] N/A 2/8/2019    EGD BIOPSY performed by Noy Prado DO at [address] [place] [address] 2/8/2019    EGD POLYP ABLATION/OTHER performed by Noy Prado DO at [facility] ENDOSCOPY       Current Medications:    Outpatient Medications Marked as Taking for the 9/8/20 encounter [facility] HOSPITAL Encounter)   Medication Sig Dispense Refill    Multiple Vitamins-Minerals (THERAPEUTIC MULTIVITAMIN-MINERALS) tablet Take 1 tablet by mouth daily      Liraglutide (VICTOZA) 18 MG/3ML SOPN SC injection Inject 1.2 mg into the skin once a week       lisinopril (PRINIVIL;ZESTRIL) 20 MG tablet TAKE 1 TABLET BY MOUTH EVERY DAY 90 tablet 0    amLODIPine (NORVASC) 2.5 MG tablet TAKE 1 TABLET BY MOUTH EVERY DAY 90
No
Patient information on fall and injury prevention
tablet 1    carvedilol (COREG) 25 MG tablet TAKE 1 TABLET BY MOUTH TWICE A DAY WITH MEALS 180 tablet 1    citalopram (CELEXA) 40 MG tablet TAKE 1 TABLET BY MOUTH EVERY DAY 90 tablet 0    atorvastatin (LIPITOR) 80 MG tablet Take 1 tablet by mouth daily 90 tablet 1    gabapentin (NEURONTIN) 300 MG capsule Take 2 capsules by mouth 3 times daily for 90 days. 540 capsule 0    pantoprazole (PROTONIX) 40 MG tablet Take 1 tablet by mouth daily 90 tablet 1    Insulin Degludec (TRESIBA FLEXTOUCH) 200 UNIT/ML SOPN Inject 40 units into the skin once daily. 5 pen 1    Cholecalciferol (VITAMIN D3) 2000 units CAPS Take 2,000 Units by mouth daily       aspirin 81 MG chewable tablet Take 1 tablet by mouth daily.  30 tablet        Allergies:  Doxycycline    Immunizations :   Immunization History   Administered Date(s) Administered    Influenza Virus Vaccine 10/13/2014, 11/10/2015, 10/31/2016    Influenza, Quadv, IM, PF (6 mo and older Fluzone, Flulaval, Fluarix, and 3 yrs and older Afluria) 2017    Influenza, Quadv, Recombinant, IM PF (Flublok 18 yrs and older) 10/12/2018    Pneumococcal Conjugate 13-valent ([id number]) 2019    Pneumococcal Polysaccharide ([id number]) 2005, 2014    Td, unspecified formulation 1999    Tdap (Boostrix, Adacel) 2008    Zoster Live (Zostavax) 2014       Social History:   Social History     Tobacco Use    Smoking status: Former Smoker     Packs/day: 0.50     Years: 10.00     Pack years: 5.00     Last attempt to quit: 11/10/2013     Years since quittin.8    Smokeless tobacco: Never Used   Substance Use Topics    Alcohol use: Not Currently     Alcohol/week: 0.0 standard drinks     Comment: occasionally    Drug use: No     Social History     Tobacco Use   Smoking Status Former Smoker    Packs/day: 0.50    Years: 10.00    Pack years: 5.00    Last attempt to quit: 11/10/2013    Years since quittin.8   Smokeless Tobacco Never Used
Family History   Problem Relation Age of Onset    High Blood Pressure Mother     Cancer Mother     Rheum Arthritis Mother     COPD Father     Cancer Father     Osteoarthritis Neg Hx     Asthma Neg Hx     Breast Cancer Neg Hx     Diabetes Neg Hx     Heart Failure Neg Hx     High Cholesterol Neg Hx     Hypertension Neg Hx     Migraines Neg Hx     Ovarian Cancer Neg Hx     Rashes/Skin Problems Neg Hx     Seizures Neg Hx     Stroke Neg Hx     Thyroid Disease Neg Hx          REVIEW OF SYSTEMS:    No fever / chills / sweats. No weight loss. No visual change, eye pain, eye discharge. No oral lesion, sore throat, dysphagia. Denies cough / sputum/Sob   Denies chest pain, palpitations/ dizziness  Denies nausea/ vomiting/abdominal pain/diarrhea. Denies dysuria or change in urinary function. Denies joint swelling or pain. No myalgia, arthralgia. No rashes, skin lesions   Denies focal weakness, sensory change or other neurologic symptoms  No lymph node swelling or tenderness.     Left foot swelling and pain   PHYSICAL EXAM:      Vitals:    BP (!) 151/82   Pulse 67   Temp 98.2 °F (36.8 °C) (Oral)   Resp 15   Ht 5' 3\" (1.6 m)   Wt 217 lb 6 oz (98.6 kg)   LMP 06/15/1999   SpO2 96%   BMI 38.51 kg/m²     General Appearance: alert,in no acute distress, +  pallor, no icterus   Skin: warm and dry, no rash or erythema  Head: normocephalic and atraumatic  Eyes: pupils equal, round, and reactive to light, conjunctivae normal  ENT: tympanic membrane, external ear and ear canal normal bilaterally, nose without deformity, nasal mucosa and turbinates normal without polyps  Neck: supple and non-tender without mass, no thyromegaly  no cervical lymphadenopathy  Pulmonary/Chest: clear to auscultation bilaterally- no wheezes, rales or rhonchi, normal air movement, no respiratory distress  Cardiovascular: normal rate, regular rhythm, normal S1 and S2, no murmurs, rubs, clicks, or gallops, no carotid
bruits  Abdomen: soft, non-tender, non-distended, normal bowel sounds, no masses or organomegaly  Extremities: no cyanosis, clubbing or edema  Musculoskeletal: normal range of motion, no joint swelling, deformity or tenderness  Neurologic: reflexes normal and symmetric, no cranial nerve deficit  Psych:  Orientation, sensorium, mood normal  Lines: IV  Left foot dressing+ ace wrap    Data Review:    Lab Results   Component Value Date    WBC 13.1 (H) 09/09/2020    HGB 9.0 (L) 09/09/2020    HCT 27.3 (L) 09/09/2020    MCV 91.1 09/09/2020     09/09/2020     Lab Results   Component Value Date    CREATININE 1.4 (H) 09/09/2020    BUN 39 (H) 09/09/2020     (L) 09/09/2020    K 4.1 09/09/2020    CL 99 09/09/2020    CO2 24 09/09/2020       Hepatic Function Panel:   Lab Results   Component Value Date    ALKPHOS 117 09/08/2020    ALT 13 09/08/2020    AST 19 09/08/2020    PROT 7.5 09/08/2020    BILITOT 0.5 09/08/2020    LABALBU 3.1 09/08/2020       UA:  Lab Results   Component Value Date    COLORU Yellow 09/30/2019    CLARITYU SL CLOUDY 09/30/2019    GLUCOSEU Negative 09/30/2019    BILIRUBINUR Negative 09/30/2019    BILIRUBINUR neg 08/01/2016    KETUA Negative 09/30/2019    SPECGRAV >=1.030 09/30/2019    BLOODU TRACE-INTACT 09/30/2019    PHUR 5.5 09/30/2019    PROTEINU 100 09/30/2019    UROBILINOGEN 0.2 09/30/2019    NITRU Negative 09/30/2019    LEUKOCYTESUR Negative 09/30/2019    LABMICR 59.80 02/06/2020    LABMICR YES 09/30/2019    URINETYPE [place] 09/30/2019      Urine Microscopic:   Lab Results   Component Value Date    LABCAST 10-20 Hyaline 09/30/2019    BACTERIA 2+ 09/30/2019    HYALCAST 3 01/17/2019    WBCUA 8 09/30/2019    RBCUA 0-2 09/30/2019    EPIU >36 09/30/2019        CRP  395      pROCAL  0.58      Wbc 19   MICRO: cultures reviewed and updated by me     Procedure  Component  Value  Units  Date/Time    Culture, Body Fluid [[id number]]  (Abnormal)  Collected: 09/08/20 2126    Order Status: Completed
Specimen: Body Fluid  Updated: 09/10/20 1056     Gram Stain Result  2+ WBC's (Polymorphonuclear)   2+ Gram positive cocci  in chains - resembling Strep   Abnormal       Organism  Strep agalactiae (Beta Strep Group B)Abnormal       Body Fluid Culture, Sterile  --     Light growth   Susceptibility testing of penicillin and other beta lactams is   not necessary for beta hemolytic Streptococci since resistant   strains have not been identified. (CLSI M100)    Narrative:      ORDER#: [id number]                          ORDERED BY: GERALDINE RAMESH   SOURCE: Fluid INCISION AND DRAINAGE LEFT FOCOLLECTED:  09/08/20 21:26   ANTIBIOTICS AT [facility]. :                      RECEIVED :  09/09/20 15:24   Performed at:   [facility]   [address] [address]   Phone [phone]    Culture with Jason Reading [[id number]]   Collected: 09/08/20 2126    Order Status: Sent  Specimen: Body Fluid  Updated: 09/10/20 1011     AFB Smear  Further report to follow    Narrative:      ORDER#: [id number]                          ORDERED BY: Ruben Dudley   SOURCE: Fluid                              COLLECTED:  09/08/20 21:26   ANTIBIOTICS AT [facility]. :                      RECEIVED :  09/09/20 15:25   Performed at:   [facility]   [address] [address]   Phone [phone]    Culture, Wound [[id number]]  (Abnormal)  Collected: 09/08/20 1048    Order Status: Completed  Specimen: Foot  Updated: 09/10/20 0754     WOUND/ABSCESS  --Abnormal       Mixed skin sonya,  Rare growth   No further workup   Abnormal       Gram Stain Result  1+ Gram positive cocci   2+ WBC's (Polymorphonuclear)   1+ WBC's (Mononuclear)   Abnormal       Organism  BHS Group B (Strep agalacticae)Abnormal       WOUND/ABSCESS  --     Heavy growth   Susceptibility testing of penicillin and other beta lactams is   not necessary for beta hemolytic Streptococci since
resistant   strains have not been identified. (CLSI M100)    Narrative:      ORDER#: [id number]                          ORDERED BY: Mary Kate Osorio   SOURCE: Foot Left                          COLLECTED:  09/08/20 10:48   ANTIBIOTICS AT [facility]. :                      RECEIVED :  09/08/20 15:27   Performed at:   [place] [address] [facility] [address]   Phone [phone]    Culture, Fungus [[id number]]   Collected: 09/08/20 2126    Order Status: Sent  Specimen: Body Fluid  Updated: 09/09/20 1524    Culture, Anaerobic [[id number]]   Collected: 09/08/20 2126    Order Status: Sent  Specimen: Body Fluid           IMAGING:    XR CHEST (2 VW)   Final Result      No acute findings        X ray Left foot reviewed  9/8   Impression    Stable postsurgical changes of the 1st proximal and distal phalanges.     Diffuse soft tissue edema surrounding the 1st digit.  No subcutaneous air.               All the pertinent images and reports for the current Hospitalization were reviewed by me     Scheduled Meds:   cefTRIAXone (ROCEPHIN) IV  2 g Intravenous Q24H    [START ON 9/11/2020] enoxaparin  30 mg Subcutaneous Daily    amLODIPine  2.5 mg Oral Daily    aspirin  81 mg Oral Daily    atorvastatin  80 mg Oral Daily    carvedilol  6.25 mg Oral BID WC    citalopram  40 mg Oral Daily    lisinopril  20 mg Oral Daily    pantoprazole  40 mg Oral Daily    sodium chloride flush  10 mL Intravenous 2 times per day    insulin glargine  20 Units Subcutaneous BID    insulin lispro  0-12 Units Subcutaneous TID WC    insulin lispro  0-6 Units Subcutaneous Nightly       Continuous Infusions:   dextrose         PRN Meds:  sodium chloride flush, acetaminophen **OR** acetaminophen, polyethylene glycol, promethazine **OR** ondansetron, glucose, dextrose, glucagon (rDNA), dextrose, labetalol      Assessment:     Patient Active Problem List   Diagnosis    Meniere's disease    Peripheral
neuropathy    Depression    Mixed hyperlipidemia    Essential hypertension    Obesity    ASNHL (asymmetrical sensorineural hearing loss)    Poor compliance with medication    DM type 2 with diabetic peripheral neuropathy (HCC)    Diabetic ulcer of toe of left foot associated with diabetes mellitus due to underlying condition, with fat layer exposed ([address]    CKD (chronic kidney disease) stage 3, GFR 30-59 ml/min (HCC)    ANGLE (obstructive sleep apnea)    Chronic GERD    Morbid obesity with BMI of 45.0-49.9, adult ([facility])    Incisional hernia, without obstruction or gangrene    S/P laparoscopic sleeve gastrectomy    Anemia    Ulcer of toe of right foot, with fat layer exposed ([address]    Ulcer of toe of left foot, with fat layer exposed ([address]    Diabetic foot infection ([facility])     Sepsis  Fevers  WBC elevation  Surgical site infection Left great toe  S/p Left foot surgery with great toe arthrodesis and pin placement  CKD  DM poor control   Obesity      S/p surgery on 9/8 deep infection with tendon involvement and Group B strep on the cx will adjust IV abx and anticipate IV abx at d/c may need PICC line vs chest line   Going to OR today for repeat ID    Labs, Microbiology, Radiology and all the pertinent results from current hospitalization and  care every where were reviewed  by me as a part of the evaluation   Plan:   1. D/C IV Vancomycin   2. Change to IV Ceftriaxone x 2 gm x q 24 HRS x4 weeks   3. Wound cx group B strep  4. Blood cx NGTD  5. Watch creat  6. Will need PICC line if ok with Renal team if she followed by one due to CKD or Chest line   7. RICHARD done   8. Needs DM control        Discussed with patient/Family and Nursing   Risk of Complications/Morbidity: High      · Illness(es)/ Infection present that pose threat to bodily function. · There is potential for severe exacerbation of infection/side effects of treatment. Therapy requires intensive monitoring for antimicrobial agent toxicity.
Discussed with patient/Family and Nursing staff     Thanks for allowing me to participate in your patient's care and please call me with any questions or concerns.     Adan Carter MD  Infectious Disease  [facility] ([facility]) Physician  Phone: [phone]   Fax : [phone]

## 2023-06-07 ENCOUNTER — APPOINTMENT (OUTPATIENT)
Dept: NEPHROLOGY | Facility: CLINIC | Age: 53
End: 2023-06-07
Payer: COMMERCIAL

## 2023-06-07 VITALS — SYSTOLIC BLOOD PRESSURE: 168 MMHG | DIASTOLIC BLOOD PRESSURE: 87 MMHG | HEART RATE: 80 BPM

## 2023-06-07 PROCEDURE — 99214 OFFICE O/P EST MOD 30 MIN: CPT

## 2023-06-07 RX ORDER — ASPIRIN 81 MG
81 TABLET, DELAYED RELEASE (ENTERIC COATED) ORAL
Refills: 0 | Status: ACTIVE | COMMUNITY

## 2023-06-07 RX ORDER — SEMAGLUTIDE 0.68 MG/ML
2 INJECTION, SOLUTION SUBCUTANEOUS
Qty: 1 | Refills: 0 | Status: ACTIVE | COMMUNITY
Start: 2021-03-31

## 2023-06-07 NOTE — PHYSICAL EXAM
[General Appearance - Alert] : alert [General Appearance - In No Acute Distress] : in no acute distress [Sclera] : the sclera and conjunctiva were normal [Extraocular Movements] : extraocular movements were intact [Outer Ear] : the ears and nose were normal in appearance [Examination Of The Oral Cavity] : the lips and gums were normal [Neck Appearance] : the appearance of the neck was normal [Auscultation Breath Sounds / Voice Sounds] : lungs were clear to auscultation bilaterally [Heart Rate And Rhythm] : heart rate was normal and rhythm regular [Heart Sounds] : normal S1 and S2 [Abnormal Walk] : normal gait [Involuntary Movements] : no involuntary movements were seen [] : no rash [No Focal Deficits] : no focal deficits [Oriented To Time, Place, And Person] : oriented to person, place, and time [Impaired Insight] : insight and judgment were intact [Affect] : the affect was normal

## 2023-06-08 LAB
ALBUMIN SERPL ELPH-MCNC: 3.7 G/DL
ANION GAP SERPL CALC-SCNC: 10 MMOL/L
BUN SERPL-MCNC: 49 MG/DL
CALCIUM SERPL-MCNC: 8.8 MG/DL
CALCIUM SERPL-MCNC: 8.8 MG/DL
CHLORIDE SERPL-SCNC: 106 MMOL/L
CO2 SERPL-SCNC: 28 MMOL/L
CREAT SERPL-MCNC: 4.58 MG/DL
EGFR: 15 ML/MIN/1.73M2
GLUCOSE SERPL-MCNC: 108 MG/DL
IRON SATN MFR SERPL: 20 %
IRON SERPL-MCNC: 52 UG/DL
PARATHYROID HORMONE INTACT: 439 PG/ML
PHOSPHATE SERPL-MCNC: 4.4 MG/DL
POTASSIUM SERPL-SCNC: 4.5 MMOL/L
SODIUM SERPL-SCNC: 144 MMOL/L
TIBC SERPL-MCNC: 258 UG/DL
UIBC SERPL-MCNC: 206 UG/DL

## 2023-06-08 NOTE — HISTORY OF PRESENT ILLNESS
[FreeTextEntry1] : 53 yo M following up for CKD 4, proteinuria, HTN and DMT2.\par Essentially demoted himself at one of his work positions a month ago due to stress.  Now doing much better.  \par No complaints, no uremic symptoms.  Compliant with meds and nasal CPAP.  Reports BPs okay when he checks.  followed up with endo couple weeks ago, reports A1C still below 7% but dosage increase in Ozempic from 0.25 to 0.5mg.\par Last labs showed progression of CKD.  Has been attending appointments at [facility] transplant [facility].  Cousin not a good match, possibility one of his sons may be.  \par   \par No NSAID use\par Denies HA, CP, SOB, dizziness.\par Frothy urine but no flank pain, dysuria or hematuria\par \par PCP: Dr. Corwin Foster\par endo: Dr. Zenaida Macias\par \par 1/7/21: Reports feeling fleeting episodes of left sided numbness lasting up to a minute.  Had echo and duplex carotid US done on 12/31 for PCP.  Full hospital workup 7/2021 negative. Self stopped plavix, only on baby aspirin. Denies symptoms since admission. \par 12/2019: Optometry revealed mild proliferative changes due to DM, f/u 1 year.\par \par PMH: \par In [facility] May 2019 for accelerated HTN and found to have STAN- with increase in creat to 2.4 from 1.79 baseline. Is aware that he had not been focused on BP and also A1C elevated-

## 2023-06-08 NOTE — ASSESSMENT
[FreeTextEntry1] : 51 yo man with CKD 4, HTN, proteinuria, DMT2 and prior h/o STAN, anxiety with depression\par \par --HTN-  BP elevated today.  c/w present regimen.  Advised he check BP at home more regularly.  If >150/85, instructed him to increase amlodipine to 10mg daily.\par -CKD 4- creat stable at 3.92-  prior bump up without improvement suggestive of progression of disease.\par f/u at [facility]; if unable to secure a living donor may need to prepare for dialysis- will discuss further next OV\par monitoring BP; limit protein intake; avoiding NSAIDs.\par --proteinuria--continue losartan. defer other agents as CKD is advanced \par --DM-  A1C controlled- follow up with endo. \par --ALMAS - c/w CPAP and weight loss efforts.\par --HLD - c/w high dose statin. LDL remains above goal from prior determination- to repeat with next OV, consider additional agent\par --hyperuremia - uric acid controlled\par --hyperparathyroidism, secondary - c/w calcitriol\par -acute stress reaction-  resolved\par \par f/u 3 months\par \par addendum 6/8: Cr 4.5 (eGFR 15), likely further progression of disease.  Not uremic and attending follow-ups at transplant center.  PTH remains on high side, increase to 0.5mcg daily.

## 2023-07-24 NOTE — REASON FOR VISIT
[Follow-Up] : a follow-up visit [FreeTextEntry1] : for HTN, DM type 2, CKD 4 Special Stains Stage 1 - Results: Base On Clearance Noted Above

## 2023-09-27 ENCOUNTER — APPOINTMENT (OUTPATIENT)
Dept: NEPHROLOGY | Facility: CLINIC | Age: 53
End: 2023-09-27
Payer: COMMERCIAL

## 2023-09-27 VITALS — SYSTOLIC BLOOD PRESSURE: 130 MMHG | HEART RATE: 78 BPM | DIASTOLIC BLOOD PRESSURE: 69 MMHG

## 2023-09-27 DIAGNOSIS — N25.81 SECONDARY HYPERPARATHYROIDISM OF RENAL ORIGIN: ICD-10-CM

## 2023-09-27 DIAGNOSIS — I10 ESSENTIAL (PRIMARY) HYPERTENSION: ICD-10-CM

## 2023-09-27 DIAGNOSIS — E61.1 IRON DEFICIENCY: ICD-10-CM

## 2023-09-27 DIAGNOSIS — G47.33 OBSTRUCTIVE SLEEP APNEA (ADULT) (PEDIATRIC): ICD-10-CM

## 2023-09-27 PROCEDURE — 99214 OFFICE O/P EST MOD 30 MIN: CPT

## 2023-09-28 LAB
ALBUMIN SERPL ELPH-MCNC: 3.8 G/DL
ANION GAP SERPL CALC-SCNC: 14 MMOL/L
BUN SERPL-MCNC: 40 MG/DL
CALCIUM SERPL-MCNC: 8.7 MG/DL
CALCIUM SERPL-MCNC: 8.7 MG/DL
CHLORIDE SERPL-SCNC: 107 MMOL/L
CHOLEST SERPL-MCNC: 143 MG/DL
CO2 SERPL-SCNC: 26 MMOL/L
CREAT SERPL-MCNC: 4.71 MG/DL
EGFR: 14 ML/MIN/1.73M2
ESTIMATED AVERAGE GLUCOSE: 111 MG/DL
FERRITIN SERPL-MCNC: 116 NG/ML
GLUCOSE SERPL-MCNC: 120 MG/DL
HBA1C MFR BLD HPLC: 5.5 %
HDLC SERPL-MCNC: 31 MG/DL
IRON SATN MFR SERPL: 20 %
IRON SERPL-MCNC: 49 UG/DL
LDLC SERPL CALC-MCNC: 86 MG/DL
NONHDLC SERPL-MCNC: 112 MG/DL
PARATHYROID HORMONE INTACT: 505 PG/ML
PHOSPHATE SERPL-MCNC: 4.9 MG/DL
POTASSIUM SERPL-SCNC: 4.8 MMOL/L
SODIUM SERPL-SCNC: 146 MMOL/L
TIBC SERPL-MCNC: 247 UG/DL
TRIGL SERPL-MCNC: 146 MG/DL
UIBC SERPL-MCNC: 197 UG/DL
URATE SERPL-MCNC: 9.8 MG/DL

## 2024-02-09 RX ORDER — CALCITRIOL 0.5 UG/1
0.5 CAPSULE, LIQUID FILLED ORAL
Qty: 90 | Refills: 3 | Status: ACTIVE | COMMUNITY
Start: 2022-03-07 | End: 1900-01-01

## 2024-02-09 RX ORDER — CARVEDILOL 25 MG/1
25 TABLET, FILM COATED ORAL
Qty: 180 | Refills: 3 | Status: ACTIVE | COMMUNITY
Start: 2021-07-05 | End: 1900-01-01

## 2024-02-09 RX ORDER — ATORVASTATIN CALCIUM 80 MG/1
80 TABLET, FILM COATED ORAL DAILY
Qty: 90 | Refills: 3 | Status: ACTIVE | COMMUNITY
Start: 2021-07-27 | End: 1900-01-01

## 2024-02-09 RX ORDER — HYDRALAZINE HYDROCHLORIDE 100 MG/1
100 TABLET ORAL
Qty: 270 | Refills: 3 | Status: ACTIVE | COMMUNITY
Start: 2021-07-05 | End: 1900-01-01

## 2024-05-09 VITALS
TEMPERATURE: 98 F | DIASTOLIC BLOOD PRESSURE: 83 MMHG | OXYGEN SATURATION: 98 % | WEIGHT: 199.96 LBS | HEIGHT: 69 IN | RESPIRATION RATE: 16 BRPM | SYSTOLIC BLOOD PRESSURE: 195 MMHG | HEART RATE: 82 BPM

## 2024-05-09 PROCEDURE — 99285 EMERGENCY DEPT VISIT HI MDM: CPT

## 2024-05-09 NOTE — ED ADULT TRIAGE NOTE - CHIEF COMPLAINT QUOTE
DISPLAY PLAN FREE TEXT
Patient presenting to the ED with high blood pressure, patient is axox3, spont breathing. States that he was driving and started feeling anxious, checked BP and noted SBP was in 190s. Denies headache/dizziness, no nausea/vomiting. Lou numbness/tingling.

## 2024-05-10 ENCOUNTER — TRANSCRIPTION ENCOUNTER (OUTPATIENT)
Age: 54
End: 2024-05-10

## 2024-05-10 ENCOUNTER — INPATIENT (INPATIENT)
Facility: HOSPITAL | Age: 54
LOS: 0 days | Discharge: HOME | End: 2024-05-11
Attending: STUDENT IN AN ORGANIZED HEALTH CARE EDUCATION/TRAINING PROGRAM | Admitting: STUDENT IN AN ORGANIZED HEALTH CARE EDUCATION/TRAINING PROGRAM
Payer: COMMERCIAL

## 2024-05-10 DIAGNOSIS — Z29.9 ENCOUNTER FOR PROPHYLACTIC MEASURES, UNSPECIFIED: ICD-10-CM

## 2024-05-10 DIAGNOSIS — Z90.49 ACQUIRED ABSENCE OF OTHER SPECIFIED PARTS OF DIGESTIVE TRACT: Chronic | ICD-10-CM

## 2024-05-10 DIAGNOSIS — D64.9 ANEMIA, UNSPECIFIED: ICD-10-CM

## 2024-05-10 DIAGNOSIS — N17.9 ACUTE KIDNEY FAILURE, UNSPECIFIED: ICD-10-CM

## 2024-05-10 DIAGNOSIS — R79.89 OTHER SPECIFIED ABNORMAL FINDINGS OF BLOOD CHEMISTRY: ICD-10-CM

## 2024-05-10 DIAGNOSIS — E11.9 TYPE 2 DIABETES MELLITUS WITHOUT COMPLICATIONS: ICD-10-CM

## 2024-05-10 DIAGNOSIS — I10 ESSENTIAL (PRIMARY) HYPERTENSION: ICD-10-CM

## 2024-05-10 LAB
A1C WITH ESTIMATED AVERAGE GLUCOSE RESULT: 5.1 % — SIGNIFICANT CHANGE UP (ref 4–5.6)
ADD ON TEST-SPECIMEN IN LAB: SIGNIFICANT CHANGE UP
ADD ON TEST-SPECIMEN IN LAB: SIGNIFICANT CHANGE UP
ALBUMIN SERPL ELPH-MCNC: 3.4 G/DL — SIGNIFICANT CHANGE UP (ref 3.3–5)
ALBUMIN, RANDOM URINE: 52.9 MG/DL — SIGNIFICANT CHANGE UP
ALBUMIN/CREATININE RATIO (ACR): 481 MG/G — HIGH (ref 0–30)
ALP SERPL-CCNC: 93 U/L — SIGNIFICANT CHANGE UP (ref 40–120)
ALT FLD-CCNC: <5 U/L — LOW (ref 10–45)
ANION GAP SERPL CALC-SCNC: 14 MMOL/L — SIGNIFICANT CHANGE UP (ref 5–17)
ANION GAP SERPL CALC-SCNC: 15 MMOL/L — SIGNIFICANT CHANGE UP (ref 5–17)
APPEARANCE UR: CLEAR — SIGNIFICANT CHANGE UP
AST SERPL-CCNC: 10 U/L — SIGNIFICANT CHANGE UP (ref 10–40)
BACTERIA # UR AUTO: NEGATIVE /HPF — SIGNIFICANT CHANGE UP
BASOPHILS # BLD AUTO: 0.04 K/UL — SIGNIFICANT CHANGE UP (ref 0–0.2)
BASOPHILS # BLD AUTO: 0.07 K/UL — SIGNIFICANT CHANGE UP (ref 0–0.2)
BASOPHILS NFR BLD AUTO: 0.7 % — SIGNIFICANT CHANGE UP (ref 0–2)
BASOPHILS NFR BLD AUTO: 0.7 % — SIGNIFICANT CHANGE UP (ref 0–2)
BILIRUB SERPL-MCNC: 0.5 MG/DL — SIGNIFICANT CHANGE UP (ref 0.2–1.2)
BILIRUB UR-MCNC: NEGATIVE — SIGNIFICANT CHANGE UP
BUN SERPL-MCNC: 69 MG/DL — HIGH (ref 7–23)
BUN SERPL-MCNC: 70 MG/DL — HIGH (ref 7–23)
CALCIUM SERPL-MCNC: 8.9 MG/DL — SIGNIFICANT CHANGE UP (ref 8.4–10.5)
CALCIUM SERPL-MCNC: 9.3 MG/DL — SIGNIFICANT CHANGE UP (ref 8.4–10.5)
CHLORIDE SERPL-SCNC: 101 MMOL/L — SIGNIFICANT CHANGE UP (ref 96–108)
CHLORIDE SERPL-SCNC: 104 MMOL/L — SIGNIFICANT CHANGE UP (ref 96–108)
CK SERPL-CCNC: 190 U/L — SIGNIFICANT CHANGE UP (ref 30–200)
CO2 SERPL-SCNC: 21 MMOL/L — LOW (ref 22–31)
CO2 SERPL-SCNC: 22 MMOL/L — SIGNIFICANT CHANGE UP (ref 22–31)
COLOR SPEC: YELLOW — SIGNIFICANT CHANGE UP
CREAT ?TM UR-MCNC: 110 MG/DL — SIGNIFICANT CHANGE UP
CREAT ?TM UR-MCNC: 110 MG/DL — SIGNIFICANT CHANGE UP
CREAT ?TM UR-MCNC: 92 MG/DL — SIGNIFICANT CHANGE UP
CREAT SERPL-MCNC: 6.54 MG/DL — HIGH (ref 0.5–1.3)
CREAT SERPL-MCNC: 6.61 MG/DL — HIGH (ref 0.5–1.3)
DIFF PNL FLD: NEGATIVE — SIGNIFICANT CHANGE UP
EGFR: 10 ML/MIN/1.73M2 — LOW
EGFR: 9 ML/MIN/1.73M2 — LOW
EOSINOPHIL # BLD AUTO: 0.11 K/UL — SIGNIFICANT CHANGE UP (ref 0–0.5)
EOSINOPHIL # BLD AUTO: 0.22 K/UL — SIGNIFICANT CHANGE UP (ref 0–0.5)
EOSINOPHIL NFR BLD AUTO: 1.9 % — SIGNIFICANT CHANGE UP (ref 0–6)
EOSINOPHIL NFR BLD AUTO: 2.3 % — SIGNIFICANT CHANGE UP (ref 0–6)
ESTIMATED AVERAGE GLUCOSE: 100 MG/DL — SIGNIFICANT CHANGE UP (ref 68–114)
GLUCOSE BLDC GLUCOMTR-MCNC: 107 MG/DL — HIGH (ref 70–99)
GLUCOSE BLDC GLUCOMTR-MCNC: 115 MG/DL — HIGH (ref 70–99)
GLUCOSE BLDC GLUCOMTR-MCNC: 123 MG/DL — HIGH (ref 70–99)
GLUCOSE SERPL-MCNC: 123 MG/DL — HIGH (ref 70–99)
GLUCOSE SERPL-MCNC: 158 MG/DL — HIGH (ref 70–99)
GLUCOSE UR QL: NEGATIVE MG/DL — SIGNIFICANT CHANGE UP
HCT VFR BLD CALC: 28.1 % — LOW (ref 39–50)
HCT VFR BLD CALC: 29.8 % — LOW (ref 39–50)
HGB BLD-MCNC: 10.5 G/DL — LOW (ref 13–17)
HGB BLD-MCNC: 9.6 G/DL — LOW (ref 13–17)
IMM GRANULOCYTES NFR BLD AUTO: 0.5 % — SIGNIFICANT CHANGE UP (ref 0–0.9)
IMM GRANULOCYTES NFR BLD AUTO: 0.7 % — SIGNIFICANT CHANGE UP (ref 0–0.9)
KETONES UR-MCNC: NEGATIVE MG/DL — SIGNIFICANT CHANGE UP
LEUKOCYTE ESTERASE UR-ACNC: NEGATIVE — SIGNIFICANT CHANGE UP
LYMPHOCYTES # BLD AUTO: 1.27 K/UL — SIGNIFICANT CHANGE UP (ref 1–3.3)
LYMPHOCYTES # BLD AUTO: 1.41 K/UL — SIGNIFICANT CHANGE UP (ref 1–3.3)
LYMPHOCYTES # BLD AUTO: 14.7 % — SIGNIFICANT CHANGE UP (ref 13–44)
LYMPHOCYTES # BLD AUTO: 21.9 % — SIGNIFICANT CHANGE UP (ref 13–44)
MAGNESIUM SERPL-MCNC: 1.9 MG/DL — SIGNIFICANT CHANGE UP (ref 1.6–2.6)
MAGNESIUM SERPL-MCNC: 2 MG/DL — SIGNIFICANT CHANGE UP (ref 1.6–2.6)
MCHC RBC-ENTMCNC: 31.6 PG — SIGNIFICANT CHANGE UP (ref 27–34)
MCHC RBC-ENTMCNC: 31.7 PG — SIGNIFICANT CHANGE UP (ref 27–34)
MCHC RBC-ENTMCNC: 34.2 GM/DL — SIGNIFICANT CHANGE UP (ref 32–36)
MCHC RBC-ENTMCNC: 35.2 GM/DL — SIGNIFICANT CHANGE UP (ref 32–36)
MCV RBC AUTO: 89.8 FL — SIGNIFICANT CHANGE UP (ref 80–100)
MCV RBC AUTO: 92.7 FL — SIGNIFICANT CHANGE UP (ref 80–100)
MONOCYTES # BLD AUTO: 0.51 K/UL — SIGNIFICANT CHANGE UP (ref 0–0.9)
MONOCYTES # BLD AUTO: 0.72 K/UL — SIGNIFICANT CHANGE UP (ref 0–0.9)
MONOCYTES NFR BLD AUTO: 7.5 % — SIGNIFICANT CHANGE UP (ref 2–14)
MONOCYTES NFR BLD AUTO: 8.8 % — SIGNIFICANT CHANGE UP (ref 2–14)
NEUTROPHILS # BLD AUTO: 3.85 K/UL — SIGNIFICANT CHANGE UP (ref 1.8–7.4)
NEUTROPHILS # BLD AUTO: 7.1 K/UL — SIGNIFICANT CHANGE UP (ref 1.8–7.4)
NEUTROPHILS NFR BLD AUTO: 66.2 % — SIGNIFICANT CHANGE UP (ref 43–77)
NEUTROPHILS NFR BLD AUTO: 74.1 % — SIGNIFICANT CHANGE UP (ref 43–77)
NITRITE UR-MCNC: NEGATIVE — SIGNIFICANT CHANGE UP
NRBC # BLD: 0 /100 WBCS — SIGNIFICANT CHANGE UP (ref 0–0)
NRBC # BLD: 0 /100 WBCS — SIGNIFICANT CHANGE UP (ref 0–0)
PH UR: 5.5 — SIGNIFICANT CHANGE UP (ref 5–8)
PHOSPHATE SERPL-MCNC: 5.2 MG/DL — HIGH (ref 2.5–4.5)
PHOSPHATE SERPL-MCNC: 5.5 MG/DL — HIGH (ref 2.5–4.5)
PLATELET # BLD AUTO: 179 K/UL — SIGNIFICANT CHANGE UP (ref 150–400)
PLATELET # BLD AUTO: 201 K/UL — SIGNIFICANT CHANGE UP (ref 150–400)
POTASSIUM SERPL-MCNC: 3.5 MMOL/L — SIGNIFICANT CHANGE UP (ref 3.5–5.3)
POTASSIUM SERPL-MCNC: 3.6 MMOL/L — SIGNIFICANT CHANGE UP (ref 3.5–5.3)
POTASSIUM SERPL-SCNC: 3.5 MMOL/L — SIGNIFICANT CHANGE UP (ref 3.5–5.3)
POTASSIUM SERPL-SCNC: 3.6 MMOL/L — SIGNIFICANT CHANGE UP (ref 3.5–5.3)
PROT ?TM UR-MCNC: 85 MG/DL — HIGH (ref 0–12)
PROT SERPL-MCNC: 6.2 G/DL — SIGNIFICANT CHANGE UP (ref 6–8.3)
PROT UR-MCNC: 100 MG/DL
PROT/CREAT UR-RTO: 0.8 RATIO — HIGH (ref 0–0.2)
RBC # BLD: 3.03 M/UL — LOW (ref 4.2–5.8)
RBC # BLD: 3.32 M/UL — LOW (ref 4.2–5.8)
RBC # FLD: 12.1 % — SIGNIFICANT CHANGE UP (ref 10.3–14.5)
RBC # FLD: 12.2 % — SIGNIFICANT CHANGE UP (ref 10.3–14.5)
RBC CASTS # UR COMP ASSIST: 0 /HPF — SIGNIFICANT CHANGE UP (ref 0–4)
SODIUM SERPL-SCNC: 137 MMOL/L — SIGNIFICANT CHANGE UP (ref 135–145)
SODIUM SERPL-SCNC: 140 MMOL/L — SIGNIFICANT CHANGE UP (ref 135–145)
SODIUM UR-SCNC: 59 MMOL/L — SIGNIFICANT CHANGE UP
SP GR SPEC: 1.01 — SIGNIFICANT CHANGE UP (ref 1–1.03)
SQUAMOUS # UR AUTO: 0 /HPF — SIGNIFICANT CHANGE UP (ref 0–5)
TROPONIN T, HIGH SENSITIVITY RESULT: 120 NG/L — CRITICAL HIGH (ref 0–51)
TROPONIN T, HIGH SENSITIVITY RESULT: 121 NG/L — CRITICAL HIGH (ref 0–51)
UROBILINOGEN FLD QL: 0.2 MG/DL — SIGNIFICANT CHANGE UP (ref 0.2–1)
UUN UR-MCNC: 502 MG/DL — SIGNIFICANT CHANGE UP
WBC # BLD: 5.81 K/UL — SIGNIFICANT CHANGE UP (ref 3.8–10.5)
WBC # BLD: 9.59 K/UL — SIGNIFICANT CHANGE UP (ref 3.8–10.5)
WBC # FLD AUTO: 5.81 K/UL — SIGNIFICANT CHANGE UP (ref 3.8–10.5)
WBC # FLD AUTO: 9.59 K/UL — SIGNIFICANT CHANGE UP (ref 3.8–10.5)
WBC UR QL: 0 /HPF — SIGNIFICANT CHANGE UP (ref 0–5)

## 2024-05-10 PROCEDURE — 76775 US EXAM ABDO BACK WALL LIM: CPT | Mod: 26

## 2024-05-10 PROCEDURE — 71045 X-RAY EXAM CHEST 1 VIEW: CPT | Mod: 26

## 2024-05-10 PROCEDURE — 99255 IP/OBS CONSLTJ NEW/EST HI 80: CPT

## 2024-05-10 PROCEDURE — 99223 1ST HOSP IP/OBS HIGH 75: CPT | Mod: GC

## 2024-05-10 RX ORDER — ISOSORBIDE MONONITRATE 60 MG/1
30 TABLET, EXTENDED RELEASE ORAL DAILY
Refills: 0 | Status: DISCONTINUED | OUTPATIENT
Start: 2024-05-10 | End: 2024-05-11

## 2024-05-10 RX ORDER — HYDRALAZINE HCL 50 MG
100 TABLET ORAL EVERY 8 HOURS
Refills: 0 | Status: DISCONTINUED | OUTPATIENT
Start: 2024-05-10 | End: 2024-05-11

## 2024-05-10 RX ORDER — DEXTROSE 50 % IN WATER 50 %
25 SYRINGE (ML) INTRAVENOUS ONCE
Refills: 0 | Status: DISCONTINUED | OUTPATIENT
Start: 2024-05-10 | End: 2024-05-11

## 2024-05-10 RX ORDER — ACETAMINOPHEN 500 MG
650 TABLET ORAL EVERY 6 HOURS
Refills: 0 | Status: DISCONTINUED | OUTPATIENT
Start: 2024-05-10 | End: 2024-05-11

## 2024-05-10 RX ORDER — GLUCAGON INJECTION, SOLUTION 0.5 MG/.1ML
1 INJECTION, SOLUTION SUBCUTANEOUS ONCE
Refills: 0 | Status: DISCONTINUED | OUTPATIENT
Start: 2024-05-10 | End: 2024-05-11

## 2024-05-10 RX ORDER — ATORVASTATIN CALCIUM 80 MG/1
80 TABLET, FILM COATED ORAL AT BEDTIME
Refills: 0 | Status: DISCONTINUED | OUTPATIENT
Start: 2024-05-10 | End: 2024-05-11

## 2024-05-10 RX ORDER — ISOSORBIDE MONONITRATE 60 MG/1
30 TABLET, EXTENDED RELEASE ORAL
Qty: 0 | Refills: 3 | DISCHARGE

## 2024-05-10 RX ORDER — ASPIRIN/CALCIUM CARB/MAGNESIUM 324 MG
81 TABLET ORAL DAILY
Refills: 0 | Status: DISCONTINUED | OUTPATIENT
Start: 2024-05-10 | End: 2024-05-11

## 2024-05-10 RX ORDER — INSULIN LISPRO 100/ML
VIAL (ML) SUBCUTANEOUS
Refills: 0 | Status: DISCONTINUED | OUTPATIENT
Start: 2024-05-10 | End: 2024-05-11

## 2024-05-10 RX ORDER — CALCITRIOL 0.5 UG/1
1 CAPSULE ORAL
Refills: 0 | DISCHARGE

## 2024-05-10 RX ORDER — AMLODIPINE BESYLATE 2.5 MG/1
5 TABLET ORAL EVERY 24 HOURS
Refills: 0 | Status: DISCONTINUED | OUTPATIENT
Start: 2024-05-10 | End: 2024-05-11

## 2024-05-10 RX ORDER — SODIUM CHLORIDE 9 MG/ML
1000 INJECTION, SOLUTION INTRAVENOUS
Refills: 0 | Status: DISCONTINUED | OUTPATIENT
Start: 2024-05-10 | End: 2024-05-11

## 2024-05-10 RX ORDER — CARVEDILOL PHOSPHATE 80 MG/1
25 CAPSULE, EXTENDED RELEASE ORAL EVERY 12 HOURS
Refills: 0 | Status: DISCONTINUED | OUTPATIENT
Start: 2024-05-10 | End: 2024-05-11

## 2024-05-10 RX ORDER — SEMAGLUTIDE 0.68 MG/ML
0.5 INJECTION, SOLUTION SUBCUTANEOUS
Refills: 0 | DISCHARGE

## 2024-05-10 RX ORDER — ACETAMINOPHEN 500 MG
975 TABLET ORAL ONCE
Refills: 0 | Status: COMPLETED | OUTPATIENT
Start: 2024-05-10 | End: 2024-05-10

## 2024-05-10 RX ORDER — DEXTROSE 50 % IN WATER 50 %
12.5 SYRINGE (ML) INTRAVENOUS ONCE
Refills: 0 | Status: DISCONTINUED | OUTPATIENT
Start: 2024-05-10 | End: 2024-05-11

## 2024-05-10 RX ORDER — DEXTROSE 10 % IN WATER 10 %
125 INTRAVENOUS SOLUTION INTRAVENOUS ONCE
Refills: 0 | Status: DISCONTINUED | OUTPATIENT
Start: 2024-05-10 | End: 2024-05-11

## 2024-05-10 RX ORDER — HEPARIN SODIUM 5000 [USP'U]/ML
5000 INJECTION INTRAVENOUS; SUBCUTANEOUS EVERY 8 HOURS
Refills: 0 | Status: DISCONTINUED | OUTPATIENT
Start: 2024-05-10 | End: 2024-05-11

## 2024-05-10 RX ORDER — DEXTROSE 50 % IN WATER 50 %
15 SYRINGE (ML) INTRAVENOUS ONCE
Refills: 0 | Status: DISCONTINUED | OUTPATIENT
Start: 2024-05-10 | End: 2024-05-11

## 2024-05-10 RX ORDER — ISOSORBIDE MONONITRATE 60 MG/1
30 TABLET, EXTENDED RELEASE ORAL
Qty: 0 | Refills: 0 | DISCHARGE

## 2024-05-10 RX ORDER — ISOSORBIDE MONONITRATE 60 MG/1
1 TABLET, EXTENDED RELEASE ORAL
Refills: 0 | DISCHARGE

## 2024-05-10 RX ORDER — CALCITRIOL 0.5 UG/1
0.5 CAPSULE ORAL DAILY
Refills: 0 | Status: DISCONTINUED | OUTPATIENT
Start: 2024-05-10 | End: 2024-05-11

## 2024-05-10 RX ADMIN — ISOSORBIDE MONONITRATE 30 MILLIGRAM(S): 60 TABLET, EXTENDED RELEASE ORAL at 14:13

## 2024-05-10 RX ADMIN — Medication 20 MILLIGRAM(S): at 14:14

## 2024-05-10 RX ADMIN — ATORVASTATIN CALCIUM 80 MILLIGRAM(S): 80 TABLET, FILM COATED ORAL at 22:36

## 2024-05-10 RX ADMIN — Medication 100 MILLIGRAM(S): at 14:13

## 2024-05-10 RX ADMIN — HEPARIN SODIUM 5000 UNIT(S): 5000 INJECTION INTRAVENOUS; SUBCUTANEOUS at 22:35

## 2024-05-10 RX ADMIN — Medication 650 MILLIGRAM(S): at 09:43

## 2024-05-10 RX ADMIN — CARVEDILOL PHOSPHATE 25 MILLIGRAM(S): 80 CAPSULE, EXTENDED RELEASE ORAL at 17:51

## 2024-05-10 RX ADMIN — Medication 975 MILLIGRAM(S): at 01:07

## 2024-05-10 RX ADMIN — HEPARIN SODIUM 5000 UNIT(S): 5000 INJECTION INTRAVENOUS; SUBCUTANEOUS at 06:54

## 2024-05-10 RX ADMIN — CALCITRIOL 0.5 MICROGRAM(S): 0.5 CAPSULE ORAL at 14:13

## 2024-05-10 RX ADMIN — Medication 100 MILLIGRAM(S): at 22:36

## 2024-05-10 RX ADMIN — Medication 650 MILLIGRAM(S): at 10:43

## 2024-05-10 RX ADMIN — Medication 100 MILLIGRAM(S): at 06:54

## 2024-05-10 RX ADMIN — HEPARIN SODIUM 5000 UNIT(S): 5000 INJECTION INTRAVENOUS; SUBCUTANEOUS at 14:14

## 2024-05-10 RX ADMIN — AMLODIPINE BESYLATE 5 MILLIGRAM(S): 2.5 TABLET ORAL at 07:17

## 2024-05-10 RX ADMIN — Medication 81 MILLIGRAM(S): at 14:13

## 2024-05-10 RX ADMIN — CARVEDILOL PHOSPHATE 25 MILLIGRAM(S): 80 CAPSULE, EXTENDED RELEASE ORAL at 06:54

## 2024-05-10 NOTE — H&P ADULT - NSVTERISKREFERASSESS_GEN_ALL_CORE
Refer to the Assessment tab to view/cancel completed assessment. Metronidazole Pregnancy And Lactation Text: This medication is Pregnancy Category B and considered safe during pregnancy.  It is also excreted in breast milk.

## 2024-05-10 NOTE — DISCHARGE NOTE PROVIDER - CARE PROVIDERS DIRECT ADDRESSES
[email or web address]

## 2024-05-10 NOTE — H&P ADULT - PROBLEM SELECTOR PLAN 6
F: caution CKD4  E: caution CKD4  N: renal/CC/DASH diet   DVT ppx: heparin subq  Dispo: [facility] F: none  E: caution CKD4  N: renal/CC/DASH diet   DVT ppx: heparin subq  Dispo: [facility]

## 2024-05-10 NOTE — ED ADULT NURSE NOTE - CADM POA CENTRAL LINE
[de-identified] : Patient Name: VINCENT MARIE  MRN: [id number]  Charlette MRN: Oncologist: Date: 08/21/2023   54y/o Male, current everyday marijuana use, w/ PMHx of HTN, type A aortic dissection s/p Dacron grafts, AV resuspension in 2013, CAD s/p CABG x 1 SVG to RCA (5/2013 with Dr. Medina), seizure disorder (last episode on 7/4/22). He had a recent hospital admission 3/20-4/14 during which patient underwent replacement of transverse aortic arch, second stage thoracic endovascular aortic repair, aorto-axillary bypass, AV replacement (bio 23mm), and CABG x 1 (SVG-RCA) EF 75% (Ignacio, 3/20). Post op c/b lactic acidosis, severe cardiogenic and vasogenic shock, TREY requiring CVVHD and temporary dialysis requirement.   Patient presented to [facility] ED 4/27 for syncope vs seizure episode at home, falling onto back of head. Neurological workup performed during that visit revealed a negative EEG, but given clinical picture of seizures, pt started on Vimpat and Depakote. Imaging preformed during that admission did not require acute surgical intervention on endo leak.   Pt presented to [place] ED on 5/4 complaining of worsening epigastric pain. CTAP revealed continued endoleak. Sent to [facility] for further evaluation.   On 5/5, pt taken to the OR, and underwent TEVAR revision. On 5/13, pt underwent Celiac, SMA, splenic and left gastric angiogram which revealed no pseudoaneurysm or any active bleeding. Post op course c/b Klebsiella bacteremia (5/15), resp failure requiring intubation on 5/18, Mucus plugging s/p Bronch 5/19 and PEA arrest.   Post operatively pt also found to have hemorrhagic pancreatitis with venu-pancreatic abscess (thought may be due to Depakote?) and is s/p IR aspiration of peripancreatic fluid collection and paracentesis on 5/22, IR venu-pancreatic abscess drainage and placement of drainage catheter on 5/24. Pt then transferred from CTICU to SICU for septic shock, and further management of hemorrhagic pancreatitis on 5/25. He is s/p IR placement of 2 new drainage catheters and catheter exchange on 5/26. LUQ drainage 5/30. He was taken to the OR 5/31 for exlap washout & replacement of 3 new JPs and abthera vac with open abdomen. Returned to OR on 6/1, no bleeding, evac of old blood, placement of feeding J-tube. He had a failed extubation and is s/p trach 6/5 with pneumonia. Repeat bronchoscopy on 6/9 with lavage. On 6/11, Aquaphoresis stopped per renal. bronch cx kleb and enterobacter, pt was on Meropenem and CTX. 6/13, TF reached goal. On 6/14, he was switched to trach collar. 6/16, CT AP done without new collections. He was improving. On 6/30 Hgb came down to 6.5 and he 1 unit given. CT AP done on suggestive of free air likely 2/2 to tube removal, multiple pancreatic collections slightly increased, moderate b/l pleural effusions, periportal edema, gastric wall thickening, stable aortic dissection. On 7/1, pt was septic and saturating 87% on RA. CT c/a/p suggestive of NEW large area of groundglass density involving the right upper and middle lobe suspicious for acute infectious inflammatory process. Pt transferred to SICU and was intubated for respiratory distress. On 7/4, Sputum growing pseudomonas, antibiotics adjusted from meropenem and vancomycin to Zosyn. On 7/8 patient's coulter was discontinued and 7/9 patient was able to be extubated. On 7/11 speech and swallow recommended a minced and moist diet. On 7/13 patient was able to step down from SICU to a telemetry floor. On 7/17, patient became hypoxic needing to be place on BiPAP to maintain adequate oxygenation. He was able to be progressed down to nasal canal then room air with adequate oxygenation. CT Chest Angio, Abd and Pelvis showed multiple old PE seen since June scans, multiple infarcts in both kidneys, possible ischemic cecum, and left upper lobe infiltrate. Patient was given lasix and started on an Heparin drip. He had a bloody BM the next day, was taken for an IVC filter and taken off heparin IV. Patient was transferred to SICU for further monitoring. Patient was restarted on SQH, ASA, and PO home meds (norvasc and metoprolol) for HTN. Minced and moist diet recommended per speech and swallow, which e was tolerating with careful calorie count monitoring. Patient was stepped down to a telemetry floor. 7/23 WBC elevation to 14, CXR confirmed scattered lung infiltrates and bilateral pleural effusions similar to previous CXRs. On 7/27 patient kept comfortable with good pain control. He was discharged on 8/4/23 to rehab, [facility] in [place].  He is tolerating a regular diet.  He does complain of abdominal pain and it is being managed with oral Tylenol. Pain is where the JTube is and he has pain with movement or if the tube is maneuvered at all. He is having regular bowel movements but does fluctuate between regular bowel movements and diarrhea.  He is concerned about "lumps" on his bilateral inner thighs which are growing.  Functional Status: Mr. MARIE is able to do physical therapy and is mostly in a wheel chair in the rehab without fatigue or dyspnea.
No

## 2024-05-10 NOTE — ED PROVIDER NOTE - NS ED ROS FT
CONSTITUTIONAL: No fever, no chills, +fatigue  EYES: No eye redness, no visual changes  ENT: No ear pain, no sore throat  CARDIOVASCULAR: No chest pain, no palpitations  RESPIRATORY: No cough, no SOB  GI: No abdominal pain, no nausea, no vomiting, no constipation, no diarrhea  GENITOURINARY: No dysuria, no frequency, no hematuria  MUSCULOSKELETAL: No back pain, no joint pain, no myalgias  SKIN: No rash, no peripheral edema  NEURO: No headache, no confusion    ALL OTHER SYSTEMS NEGATIVE

## 2024-05-10 NOTE — ED PROVIDER NOTE - OBJECTIVE STATEMENT
52 yo M w PMH of DM, HTN, HLD, CKD (follows with Dr. Faust, baseline Cr <5), p/w HTN at home. Here for hypertension, generalized fatigue. SBP at triage 198. Pt took home doses of hypertension meds PTA (hydralazine, amlodipine, carvedilol). Pt has no neuro symptoms, ha, cp, sob, hematuria, vision change, or abd pain.

## 2024-05-10 NOTE — ED ADULT NURSE NOTE - OBJECTIVE STATEMENT
Received patient ambulatory accompanied by  with chief complaint of elevated blood pressure. Said complaint started few hours prior to consult, while driving suddenly felt uncomfortable and became anxious. Denies signs and symptoms such as fever, vomiting, chest pain/discomfort, shortness of breath, diarrhea or body weakness.  On PE, AOX4, speaking full sentences without difficulty. Patient not in active cardiac or respiratory distress, no noted neurologic deficits.  No obvious trauma/injury/deformity noted. Patient oriented to ED area. All needs attended. POC reviewed. Purposeful proactive hourly rounding in progress.

## 2024-05-10 NOTE — CONSULT NOTE ADULT - ASSESSMENT
53 M with PMH of CKD, last eval on 9/2023 with progression of CKD was lázaro CKD5 already, HTN, HLD, DM,  NSTEMI, ALMAS on CPAP, Sec. Hyperparathyroidism. Now arrived with  's in ED, admitted for HTN Urgency(?emergency).   Now SBP in the 160s at goal.      Labs/Radiology reviwed.     Imp: CKD5, not on HD.   sCr 4.7 and GFR 14 already on 9/2023  Now sCr ~6.5 and GFR ~10 could be his new baseline.   HTN Urgency resolved, SBP now at goal    Plan:  On Hydralazine/Isosorbide, Carvedilol and Amlodipine, cont. current antiHTN regimen.   Start Torsemide 20 daily for now.   Avoid sudden BP drops, aim for -140 for next 24h.   SBP at goal now  No HD emergency at this point.   Obtain Fe panel  Cont. holding RAASi  Cont. calcitriol  Consider Vascular consult for venous mapping (AVF).   Once ready for dc, dc with Nephro (Dr. Duff) f/u within 1wk.      53 M with PMH of CKD, last eval on 9/2023 with progression of CKD was lázaro CKD5 already, HTN, HLD, DM,  NSTEMI, ALMAS on CPAP, Sec. Hyperparathyroidism. Now arrived with  's in ED, admitted for HTN Urgency(?emergency).   Now SBP in the 160s at goal.      Labs/Radiology reviwed.     Imp: CKD5, not on HD.   sCr 4.7 and GFR 14 already on 9/2023  Now sCr ~6.5 and GFR ~10 could be his new baseline.   HTN Urgency resolved, SBP now at goal    Plan:  On Hydralazine/Isosorbide, Carvedilol and Amlodipine, cont. current antiHTN regimen.   Start Torsemide 20 daily for now.   Avoid sudden BP drops, aim for -140 for next 24h.   SBP at goal now  No HD emergency at this point.   Obtain Fe panel  Cont. holding RAASi  Cont. calcitriol  Vascular consult for venous mapping (AVF). If no consult inpatient, dc with Vasc Sx f/u.   Once ready for dc, dc with Nephro (Dr. Duff) f/u within 1wk.      53 M with PMH of CKD5, HTN, HLD, DM,  NSTEMI, ALMAS on CPAP, Sec. Hyperparathyroidism.   Now arrived with  's in ED, admitted for HTN Urgency(?emergency).   Now SBP in the 160s at goal.      Labs/Radiology reviewed     Imp: CKD5, not on HD.   sCr 4.7 and GFR 14 already on 9/2023  Now sCr ~6.5 and GFR ~10 could be his new baseline.   HTN Urgency resolved, SBP now at goal    Plan:   On Hydralazine/Isosorbide, Carvedilol and Amlodipine, cont. current antiHTN regimen.   Start Torsemide 20 daily for now.   Avoid sudden BP drops, aim for -140 for next 24h.   SBP at goal now  Obtain Fe panel  Cont. holding RAASi  Cont. calcitriol  No emergent RRT needs at present  Avoid trauma at the level of non dominant arm   If the patient continues to be hospitalized, please arrange for a consultation with vascular surgery to perform vein mapping and assess for AVF creation.

## 2024-05-10 NOTE — DISCHARGE NOTE PROVIDER - HOSPITAL COURSE
#Discharge: do not delete    WILIAN HADLEY is a 53y Male with a past medical history of HTN, HLD, DM, CKD4 (follows with Dr. Faust, on transplant list), NSTEMI, ALMAS on CPAP, secondary hyperparathyroidism presenting with complaints of "feeling off" , found to be hypertensive to 's in ED, admitted for acute renal failure on CKD.       Problem List/Main Diagnoses (system-based):   # Acute renal failure.   Patient presenting with BUN/Cr 70/6.54 in setting of chronic CKD stage 4. Last Cr 4.5-4.7 9/2023. Follows with renal Dr. Faust as outpatient who referred him to [facility] where he is currently on transplant list. Unclear if STAN pre-renal vs worsening progression of CKD. Renal consulted. C/w home calcitriol 0.5mcg. Held home torsemide and losartan for now.  -f/u with nephrologist Dr. Faust within 1 week     #HTN (hypertension).   History of HTN on home coreg 25mg BID, amlodipine 5mg qd, hydralazine 100mg TID, imdur ER 30mg qd, losartan 50mg qd, torsemide 10mg qd. c/w coreg, amlodipine, hydralazine, and imdur.    #HLD  -c/w home atorvastatin 80mg qd.    #Diabetes mellitus.   DM2 on home ozempic 0.5mg weekly. mISS while inpatient.    #Anemia.   Hgb 10.5 on admission, down from baseline 11-12 from prior, Likely AOCD in setting of CKD.   -f/u outpatient     #Elevated troponin.   Trop elevated on admission to 120. Likely in setting of demand from renal failure. Patient denies chest pain. EKG NSR. Repeat lateral.      #history of NSTEMI  -c/w home asa and statin.    Patient was discharged to: home    New medications: none  Changes to old medications:  Medications that were stopped:    Items to follow up as outpatient: nephrologist follow up within 1 week; vascular consult     Physical exam at the time of discharge:     GENERAL: NAD, lying in bed comfortably  HEAD:  Atraumatic, normocephalic  EYES: conjunctiva and sclera clear  ENT: Moist mucous membranes  NECK: Supple  HEART: Regular rate and rhythm, +systolic murmur  LUNGS: Unlabored respirations.  Clear to auscultation bilaterally, no crackles, wheezing, or rhonchi  ABDOMEN: Soft, nontender, nondistended, +BS  EXTREMITIES: 2+ peripheral pulses bilaterally. No clubbing, cyanosis, or edema  NERVOUS SYSTEM:  A&Ox3, no focal deficits   SKIN: No rashes or lesions    LABS & STUDIES:   #Discharge: do not delete    WILIAN HADLEY is a 53y Male with a past medical history of HTN, HLD, DM, CKD4 (follows with Dr. Faust, on transplant list), NSTEMI, ALMAS on CPAP, secondary hyperparathyroidism presenting with complaints of "feeling off" , found to be hypertensive to 's in ED, admitted for acute renal failure on CKD.       Problem List/Main Diagnoses (system-based):   # Acute renal failure.   Patient presenting with BUN/Cr 70/6.54 in setting of chronic CKD stage 4. Last Cr 4.5-4.7 9/2023. Follows with renal Dr. Faust as outpatient who referred him to [facility] where he is currently on transplant list. Unclear if STAN pre-renal vs worsening progression of CKD. Renal consulted. C/w home calcitriol 0.5mcg. Held home torsemide and losartan for now.  -f/u with nephrologist Dr. Faust within 1 week   -continue your torsemide  -hold your losartan    #Hypertensive Emergency - RESOLVED  #HTN (hypertension).   Admitted w/ , found to have worsening kidney function. History of HTN on home coreg 25mg BID, amlodipine 5mg qd, hydralazine 100mg TID, imdur ER 30mg qd, losartan 50mg qd, torsemide 10mg qd. c/w coreg, amlodipine, hydralazine, and imdur. BP has been stable and wnl since admission.  -continue all medications EXCEPT losartan    #HLD  -c/w home atorvastatin 80mg qd.    #Diabetes mellitus.   DM2 on home ozempic 0.5mg weekly. mISS while inpatient.    #Anemia.   Hgb 10.5 on admission, down from baseline 11-12 from prior, Likely AOCD in setting of CKD.   -f/u outpatient     #Elevated troponin.   Trop elevated on admission to 120. Likely in setting of demand from renal failure. Patient denies chest pain. EKG NSR. Repeat lateral.      #history of NSTEMI  -c/w home asa and statin.    Patient was discharged to: home    New medications: none  Changes to old medications:  Medications that were stopped:    Items to follow up as outpatient: nephrologist follow up within 1 week; vascular consult     Physical exam at the time of discharge:     GENERAL: NAD, lying in bed comfortably  HEAD:  Atraumatic, normocephalic  EYES: conjunctiva and sclera clear  ENT: Moist mucous membranes  NECK: Supple  HEART: Regular rate and rhythm, +systolic murmur  LUNGS: Unlabored respirations.  Clear to auscultation bilaterally, no crackles, wheezing, or rhonchi  ABDOMEN: Soft, nontender, nondistended, +BS  EXTREMITIES: 2+ peripheral pulses bilaterally. No clubbing, cyanosis, or edema  NERVOUS SYSTEM:  A&Ox3, no focal deficits   SKIN: No rashes or lesions    LABS & STUDIES:

## 2024-05-10 NOTE — DISCHARGE NOTE PROVIDER - NSDCCPTREATMENT_GEN_ALL_CORE_FT
PRINCIPAL PROCEDURE  Procedure: Limited ultrasound of both kidneys  Findings and Treatment: Since 5/9/2019, there is again increased renal parenchymal echogenicity   bilaterally, consistent with medical renal disease.

## 2024-05-10 NOTE — DISCHARGE NOTE PROVIDER - PROVIDER TOKENS
PROVIDER:[TOKEN:[[id number]],SCHEDULEDAPPT:[05/23/2024],SCHEDULEDAPPTTIME:[09:30 AM]],PROVIDER:[TOKEN:[[id number]],FOLLOWUP:[1 week],ESTABLISHEDPATIENT:[T]]

## 2024-05-10 NOTE — H&P ADULT - PROBLEM SELECTOR PLAN 1
Patient presenting with BUN/Cr 70/6.54 in setting of chronic CKD stage 4 with baseline Cr around 3. Follows with renal Dr. Faust as outpatient who referred him to [facility] where he is currently on transplant list.   -unclear if STAN pre-renal vs worsening progression of CKD  -f/u UA and urine lytes   -renal consult in AM   -avoid nephrotoxic agents and renally dose meds  -c/w home calcitriol 0.5mcg qd Patient presenting with BUN/Cr 70/6.54 in setting of chronic CKD stage 4. Last Cr 4.5-4.7 9/2023. Follows with renal Dr. Faust as outpatient who referred him to [facility] where he is currently on transplant list.   -unclear if STAN pre-renal vs worsening progression of CKD  -f/u UA and urine lytes   -renal consult in AM   -avoid nephrotoxic agents and renally dose meds  -c/w home calcitriol 0.5mcg qd  -holding home torsemide and losartan for now

## 2024-05-10 NOTE — H&P ADULT - PROBLEM SELECTOR PLAN 4
Hgb 10.5 on admission, down from baseline 11-12 from prior, Likely AOCD in setting of CKD.   -no signs of bleeding on exam, patient denies any bleeding  -active T&S  -monitor CBC and transfuse for hgb <7

## 2024-05-10 NOTE — H&P ADULT - HISTORY OF PRESENT ILLNESS
53M PMH HTN, HLD, DM, CKD4 (follows with Dr. Faust, on transplant list), NSTEMI, ALMAS on CPAP, secondary hyperparathyroidism presenting with worsening renal failure.    In the ED:  Vitals: Temp 98.3F, HR 82, /83, RR 16, O2 98% RA  Labs significant for hgb 10.5, bicarb 21, BUN 70, Cr 6.54, trop 120, phos 5.2 53M PMH HTN, HLD, DM, CKD4 (follows with Dr. Faust, on transplant list), NSTEMI, ALMAS on CPAP, secondary hyperparathyroidism presenting with complaints of "feeling off" and thinking that his BP was high. Patient reports last night he started feeling like his BP was very high. Reports he just felt weird and off, unable to explain his symptoms. He did not take his BP at home but came to ED to get checked out. Admits to some lightheadedness but no dizziness. Denies any fevers, headaches, vision changes, cough, SOB, chest pain, abd pain, N/V/D, dysuria. Reports he is urinating normally. Follows with renal Dr. Faust, PCP is Kristin Hooker. Currently on transplant list at [facility].     In the ED:  Vitals: Temp 98.3F, HR 82, /83, RR 16, O2 98% RA  Labs significant for hgb 10.5, bicarb 21, BUN 70, Cr 6.54, trop 120, phos 5.2  EKG NSR, similar to prior   Interventions: tylenol 975mg PO x1

## 2024-05-10 NOTE — H&P ADULT - ASSESSMENT
53M PMH HTN, HLD, DM, CKD4 (follows with Dr. Faust, on transplant list), NSTEMI, ALMAS on CPAP, secondary hyperparathyroidism presenting with complaints of "feeling off" , found to be hypertensive to 's in ED, admitted for acute renal failure on CKD.

## 2024-05-10 NOTE — DISCHARGE NOTE PROVIDER - NSDCFUADDAPPT_GEN_ALL_CORE_FT
Please follow-up with your nephrologist, Dr. Faust within 1 week of discharge. Her office will call you to schedule an appointment. You may also call [phone] to schedule an appointment.

## 2024-05-10 NOTE — DISCHARGE NOTE PROVIDER - NSDCCPCAREPLAN_GEN_ALL_CORE_FT
PRINCIPAL DISCHARGE DIAGNOSIS  Diagnosis: Hypertensive emergency  Assessment and Plan of Treatment: You were admitted due to hypertensive emergency, which is very high blood pressure that causes symptoms and/or damage to your organs. You were found to have worsening renal function from your baseline, which      SECONDARY DISCHARGE DIAGNOSES  Diagnosis: Acute renal failure  Assessment and Plan of Treatment:      PRINCIPAL DISCHARGE DIAGNOSIS  Diagnosis: Hypertensive emergency  Assessment and Plan of Treatment: You were admitted due to hypertensive emergency, which is very high blood pressure that causes symptoms and/or damage to your organs. You were found to have worsening renal function from your baseline. Your blood pressure was much better controlled during your hospitalization. We stopped giving you losartan, which can harm your kidneys in acute injury; your blood pressure was well-controlled off of it during hospitalzation.  **Please stop taking your losartan  **Please follow up with Dr. Faust within 1 week of discharge      SECONDARY DISCHARGE DIAGNOSES  Diagnosis: Acute renal failure  Assessment and Plan of Treatment:      PRINCIPAL DISCHARGE DIAGNOSIS  Diagnosis: Hypertensive emergency  Assessment and Plan of Treatment: You were admitted due to hypertensive emergency, which is very high blood pressure that causes symptoms and/or damage to your organs. You were found to have worsening renal function from your baseline. Your blood pressure was much better controlled during your hospitalization. We stopped giving you losartan, which can harm your kidneys in acute injury; your blood pressure was well-controlled off of it during hospitalzation.  **Please stop taking your losartan  **Please follow up with Dr. Faust within 1 week of discharge      SECONDARY DISCHARGE DIAGNOSES  Diagnosis: Acute renal failure  Assessment and Plan of Treatment: You were found to have acute renal failure, which is called acute kidney injury. This could have been due to a number of reasons, including your high blood pressure when you came in, or worsening of your chronic kidney disease.     PRINCIPAL DISCHARGE DIAGNOSIS  Diagnosis: Hypertensive emergency  Assessment and Plan of Treatment: You were admitted due to hypertensive emergency, which is very high blood pressure that causes symptoms and/or damage to your organs. You were found to have worsening renal function from your baseline. Your blood pressure was much better controlled during your hospitalization. We stopped giving you losartan, which can harm your kidneys in acute injury; your blood pressure was well-controlled off of it during hospitalzation.  **Please stop taking your losartan  **Please follow up with Dr. Faust within 1 week of discharge  **Return to the hospital if you have blurry vision, severe headache, sudden weakness, or tingliness of the body, chest pain, or chest pressure      SECONDARY DISCHARGE DIAGNOSES  Diagnosis: Acute renal failure  Assessment and Plan of Treatment: You were found to have acute renal failure, which is called acute kidney injury. This could have been due to a number of reasons, including your high blood pressure when you came in, or worsening of your chronic kidney disease.  **Please stop taking your losartan  **Please follow up with Dr. Faust within 1 week of discharge

## 2024-05-10 NOTE — DISCHARGE NOTE PROVIDER - NSDCMRMEDTOKEN_GEN_ALL_CORE_FT
amLODIPine: 5 milligram(s) orally once a day  Aspirin Enteric Coated 81 mg oral delayed release tablet: 81 milligram(s) orally once a day  atorvastatin 80 mg oral tablet: 1 tab(s) orally once a day (at bedtime)   calcitriol 0.5 mcg oral capsule: 1 cap(s) orally once a day  carvedilol: 25 milligram(s) orally 2 times a day  hydrALAZINE: 100 milligram(s) orally 3 times a day  Imdur 30 mg oral tablet, extended release: 1 tab(s) orally once a day  losartan: 50 milligram(s) orally once a day  Ozempic 2 mg/1.5 mL (0.25 mg or 0.5 mg dose) subcutaneous solution: 0.5 milligram(s) subcutaneously once a week  torsemide: 10 milligram(s) orally once a day   amLODIPine: 5 milligram(s) orally once a day  Aspirin Enteric Coated 81 mg oral delayed release tablet: 81 milligram(s) orally once a day  atorvastatin 80 mg oral tablet: 1 tab(s) orally once a day (at bedtime)   calcitriol 0.5 mcg oral capsule: 1 cap(s) orally once a day  carvedilol: 25 milligram(s) orally 2 times a day  hydrALAZINE: 100 milligram(s) orally 3 times a day  Imdur 30 mg oral tablet, extended release: 1 tab(s) orally once a day  Ozempic 2 mg/1.5 mL (0.25 mg or 0.5 mg dose) subcutaneous solution: 0.5 milligram(s) subcutaneously once a week  torsemide: 10 milligram(s) orally once a day

## 2024-05-10 NOTE — H&P ADULT - ATTENDING COMMENTS
53 YOM with PMH of HTN, HLD, CKD4, ALMAS (intermittent CPAP), hyperparathyroidism admitted for hypertensive emergency, found to have worsening renal function.     Hypertensive emergency - some sensation of feeling "off" that has resolved, evidence of end organ dysfunction with elevated creatinine. Reports BP at home fluctuates -150s but sometimes even higher. Cont home amlodipine 5mg, carvedilol 25mg BID, hydralazine 100mg TID, imdur 30mg. ARB held in setting of STAN on CKD. Resume torsemide per nephro.     STAN on CKD4 vs CKD progression - suspect CKD progression (creat 4.3 in 5/2023 --> 5.7 4/2024 --> now 6.6), no urinary symptoms, declines recent NSAID/abx use. Follow up urine lytes, obtain bladder scan and renal US to r/o obstructive etiology. Evaluated by nephro, no indication to start HD. If creat remains stable tomorrow, anticipate discharge home tomorrow with close nephro follow up. Outpatient vascular evaluation for vein mapping.     Dispo - anticipate home

## 2024-05-10 NOTE — H&P ADULT - PROBLEM SELECTOR PLAN 2
History of HTN on home coreg 25mg BID, amlodipine 5mg qd, hydralazine 100mg TID, imdur ER 30mg qd, losartan 50mg qd, torsemide 10mg qd  -c/w  -monitor BPs     #HLD  -c/w home atorvastatin 80mg qd History of HTN on home coreg 25mg BID, amlodipine 5mg qd, hydralazine 100mg TID, imdur ER 30mg qd, losartan 50mg qd, torsemide 10mg qd  -c/w coreg, amlodipine, hydralazine, and imdur  -hold home losartan and torsemide for now in setting of STAN   -monitor BPs     #HLD  -c/w home atorvastatin 80mg qd

## 2024-05-10 NOTE — CONSULT NOTE ADULT - ATTENDING COMMENTS
53y M w/ hx of CKD 5, HTN, DM admitted for HTN urgency/emergency. Hospital course uneventful.   BP better controlled this morning.   Agree w/ current antihypertensive regiment - Expect gradual renal recovery close to baseline ranges  No emergent RRT needs at present  c/w renvela 800mg PO TID and calcitriol 0.5mcg PO daily   Avoid trauma at the level of non dominant arm   If the patient continues to be hospitalized, please arrange for a consultation with vascular surgery to perform vein mapping and assess for AVF creation.    Discussed with primary team   Further recs as above

## 2024-05-10 NOTE — CONSULT NOTE ADULT - SUBJECTIVE AND OBJECTIVE BOX
NEPHROLOGY CONSULTATION NOTE  Pte is known to the service, follows with Dr. Sherin alonso and is in transplant list at [facility].     53 M with PMH of CKD, last eval on 9/2023 with progression of CKD was lázaro CKD5 already, HTN, HLD, DM,  NSTEMI, ALMAS on CPAP, Sec. Hyperparathyroidism. Now arrived with  's in ED, admitted for HTN Urgency(?emergency).   Now SBP in the 160s at goal.      PAST MEDICAL & SURGICAL HISTORY:  Hypertension  Diabetes  Hyperlipidemia  CKD   NSTEMI (non-ST elevation myocardial infarction)  History of cholecystectomy    Allergies:  No Known Allergies    Home Medications Reviewed  Hospital Medications:   MEDICATIONS  (STANDING):  amLODIPine   Tablet 5 milliGRAM(s) Oral every 24 hours  aspirin enteric coated 81 milliGRAM(s) Oral daily  atorvastatin 80 milliGRAM(s) Oral at bedtime  calcitriol   Capsule 0.5 MICROGram(s) Oral daily  carvedilol 25 milliGRAM(s) Oral every 12 hours  dextrose 10% Bolus 125 milliLiter(s) IV Bolus once  dextrose 5%. 1000 milliLiter(s) (100 mL/Hr) IV Continuous <Continuous>  dextrose 5%. 1000 milliLiter(s) (50 mL/Hr) IV Continuous <Continuous>  dextrose 50% Injectable 25 Gram(s) IV Push once  dextrose 50% Injectable 12.5 Gram(s) IV Push once  glucagon  Injectable 1 milliGRAM(s) IntraMuscular once  heparin   Injectable 5000 Unit(s) SubCutaneous every 8 hours  hydrALAZINE 100 milliGRAM(s) Oral every 8 hours  insulin lispro (ADMELOG) corrective regimen sliding scale   SubCutaneous Before meals and at bedtime  isosorbide   mononitrate ER Tablet (IMDUR) 30 milliGRAM(s) Oral daily    SOCIAL HISTORY:  Denies ETOH,Smoking,   FAMILY HISTORY:      REVIEW OF SYSTEMS:  All other review of systems is negative unless indicated above.    VITALS:  Vital Signs Last 24 Hrs  T(C): 36.8 (10 May 2024 04:09), Max: 36.8 (09 May 2024 23:16)  T(F): 98.3 (10 May 2024 04:09), Max: 98.3 (09 May 2024 23:16)  HR: 84 (10 May 2024 06:42) (74 - 84)  BP: 165/79 (10 May 2024 06:42) (139/75 - 195/83)  BP(mean): --  RR: 18 (10 May 2024 04:09) (16 - 18)  SpO2: 95% (10 May 2024 04:09) (95% - 98%)    Parameters below as of 10 May 2024 04:09  Patient On (Oxygen Delivery Method): room air        05-10 @ 07:01  -  05-10 @ 12:38  --------------------------------------------------------  IN: 0 mL / OUT: 325 mL / NET: -325 mL      Height (cm): 175.3 (05-09 @ 23:16)  Weight (kg): 90.7 (05-09 @ 23:16)  BMI (kg/m2): 29.5 (05-09 @ 23:16)  BSA (m2): 2.07 (05-09 @ 23:16)    PHYSICAL EXAM:  Constitutional: NAD  Neck: No JVD  Respiratory: CTAB, no wheezes, rales or rhonchi  Cardiovascular: S1, S2, RRR  Gastrointestinal: ND/NT, +BS  Extremities:  No peripheral edema in LEs.   Neurological: A/O x 3, no focal motor deficits.       LABS:  05-10    140  |  104  |  69<H>  ----------------------------<  123<H>  3.6   |  22  |  6.61<H>    Ca    8.9      10 May 2024 10:41  Phos  5.5     05-10  Mg     2.0     05-10    TPro  6.2  /  Alb  3.4  /  TBili  0.5  /  DBili      /  AST  10  /  ALT  <5<L>  /  AlkPhos  93  05-10    Creatinine Trend: 6.61 <--, 6.54 <--                        9.6    5.81  )-----------( 179      ( 10 May 2024 10:41 )             28.1     Urine Studies:  Urinalysis Basic - ( 10 May 2024 10:41 )    Color:  / Appearance:  / SG:  / pH:   Gluc: 123 mg/dL / Ketone:   / Bili:  / Urobili:    Blood:  / Protein:  / Nitrite:    Leuk Esterase:  / RBC:  / WBC    Sq Epi:  / Non Sq Epi:  / Bacteria:       Sodium, Random Urine: 59 mmol/L (05-10 @ 05:53)  Creatinine, Random Urine: 92 mg/dL (05-10 @ 05:53)            RADIOLOGY & ADDITIONAL STUDIES: Reviewed.

## 2024-05-10 NOTE — H&P ADULT - NS ATTEST RISK PROBLEM GEN_ALL_CORE FT
complexity of problem (hypertensive emergency)  complexity of data review (prior records, labs, imaging, indep history/exam), discussion with consultants

## 2024-05-10 NOTE — H&P ADULT - PROBLEM SELECTOR PLAN 5
Trop elevated on admission to 120. Likely in setting of demand from renal failure. Patient denies chest pain. EKG NSR.  -continue to trend to peak       #history of NSTEMI  -c/w home asa and statin

## 2024-05-10 NOTE — H&P ADULT - NSHPPHYSICALEXAM_GEN_ALL_CORE
VITALS:   T(C): 36.8 (05-10-24 @ 04:09), Max: 36.8 (05-09-24 @ 23:16)  HR: 78 (05-10-24 @ 04:09) (74 - 82)  BP: 171/81 (05-10-24 @ 04:09) (139/75 - 195/83)  RR: 18 (05-10-24 @ 04:09) (16 - 18)  SpO2: 95% (05-10-24 @ 04:09) (95% - 98%)    GENERAL: NAD, lying in bed comfortably  HEAD:  Atraumatic, normocephalic  EYES: conjunctiva and sclera clear  ENT: Moist mucous membranes  NECK: Supple  HEART: Regular rate and rhythm, +systolic murmur  LUNGS: Unlabored respirations.  Clear to auscultation bilaterally, no crackles, wheezing, or rhonchi  ABDOMEN: Soft, nontender, nondistended, +BS  EXTREMITIES: 2+ peripheral pulses bilaterally. No clubbing, cyanosis, or edema  NERVOUS SYSTEM:  A&Ox3, no focal deficits   SKIN: No rashes or lesions

## 2024-05-10 NOTE — DISCHARGE NOTE PROVIDER - ATTENDING DISCHARGE PHYSICAL EXAMINATION:
Patient with stable BP, no hypertension, no chest pain, no SOB, denies LE edema. Creatinine stable, reports good UOP. No plan for HD per Nephrology, patient without AVF fistula as he is following with [facility] for Kidney transplant. Also following with Cardiology there, had TTE this week. Hb stable, no signs of bleeding.    General: AOX3, NAD, lying in bed, speaking in full sentences, no labored breathing on RA  HEENT: AT/NC, no facial asymmetry  Lungs: no crackles, no wheezes  Heart: RRR  Abdomen: soft, no tenderness  Extremities: warm, no edema, no tenderness, no calf tenderness,  no focal deficit     Continue to monitor creatinine and Kidney function. Patient will be contacted for earlier appointment per Nephrology, would refer to Vascular for mapping etc. BP controlled on current regimen, will continue on same  Patient following with [facility] for transplant

## 2024-05-10 NOTE — ED PROVIDER NOTE - CLINICAL SUMMARY MEDICAL DECISION MAKING FREE TEXT BOX
Here for hypertension measured at home, max , improved in the ED (pt took home meds PTA). Pt has no neuro symptoms, ha, cp, sob, hematuria, vision change, or abd pain.  Labs show moderate worsening of CKD Cr baseline <5, today is 6.5. BUN 70. No hyperK. Concern for worsening of kidney disease, will require admission.  Pt follows w Dr. Faust. Pursuing kidney transplant at [facility].  EKG unremarkable.

## 2024-05-10 NOTE — ED PROVIDER NOTE - PHYSICAL EXAMINATION
CONSTITUTIONAL: Non-toxic; in no apparent distress  HEAD: Normocephalic; atraumatic  EYES: PERRL; EOM intact  ENMT: External appears normal  NECK: Supple; non-tender  CARD: Normal S1, S2; no murmurs, rubs, or gallops  RESP: Normal chest excursion with respiration; breath sounds clear and equal bilaterally  ABD: Soft, non-distended; non-tender  EXT: Normal ROM in all four extremities; non-tender to palpation, no peripheral edema  SKIN: Warm, dry, no rash  NEURO:  No focal neurological deficiencies

## 2024-05-10 NOTE — ED ADULT NURSE NOTE - CHIEF COMPLAINT QUOTE
Patient presenting to the ED with high blood pressure, patient is axox3, spont breathing. States that he was driving and started feeling anxious, checked BP and noted SBP was in 190s. Denies headache/dizziness, no nausea/vomiting. Lou numbness/tingling.

## 2024-05-10 NOTE — DISCHARGE NOTE PROVIDER - CARE PROVIDER_API CALL
Dominic De Leon  Vascular Surgery  [address] [address]  Phone: [phone]  Fax: [phone]  Scheduled Appointment: 05/23/2024 09:30 AM    Maria De Jesus Duff  Nephrology  [address]  [place] [address]  Phone: [phone]  Fax: [phone]  Established Patient  Follow Up Time: 1 week

## 2024-05-11 ENCOUNTER — TRANSCRIPTION ENCOUNTER (OUTPATIENT)
Age: 54
End: 2024-05-11

## 2024-05-11 VITALS
SYSTOLIC BLOOD PRESSURE: 145 MMHG | HEART RATE: 72 BPM | OXYGEN SATURATION: 96 % | RESPIRATION RATE: 17 BRPM | TEMPERATURE: 98 F | DIASTOLIC BLOOD PRESSURE: 74 MMHG

## 2024-05-11 LAB
A1C WITH ESTIMATED AVERAGE GLUCOSE RESULT: 5.6 % — SIGNIFICANT CHANGE UP (ref 4–5.6)
ALBUMIN SERPL ELPH-MCNC: 3.4 G/DL — SIGNIFICANT CHANGE UP (ref 3.3–5)
ALP SERPL-CCNC: 102 U/L — SIGNIFICANT CHANGE UP (ref 40–120)
ALT FLD-CCNC: <5 U/L — LOW (ref 10–45)
ANION GAP SERPL CALC-SCNC: 14 MMOL/L — SIGNIFICANT CHANGE UP (ref 5–17)
AST SERPL-CCNC: 8 U/L — LOW (ref 10–40)
BASOPHILS # BLD AUTO: 0.05 K/UL — SIGNIFICANT CHANGE UP (ref 0–0.2)
BASOPHILS NFR BLD AUTO: 0.9 % — SIGNIFICANT CHANGE UP (ref 0–2)
BILIRUB SERPL-MCNC: 0.4 MG/DL — SIGNIFICANT CHANGE UP (ref 0.2–1.2)
BLD GP AB SCN SERPL QL: NEGATIVE — SIGNIFICANT CHANGE UP
BUN SERPL-MCNC: 70 MG/DL — HIGH (ref 7–23)
CALCIUM SERPL-MCNC: 9.1 MG/DL — SIGNIFICANT CHANGE UP (ref 8.4–10.5)
CHLORIDE SERPL-SCNC: 107 MMOL/L — SIGNIFICANT CHANGE UP (ref 96–108)
CO2 SERPL-SCNC: 21 MMOL/L — LOW (ref 22–31)
CREAT SERPL-MCNC: 6.88 MG/DL — HIGH (ref 0.5–1.3)
EGFR: 9 ML/MIN/1.73M2 — LOW
EOSINOPHIL # BLD AUTO: 0.13 K/UL — SIGNIFICANT CHANGE UP (ref 0–0.5)
EOSINOPHIL NFR BLD AUTO: 2.5 % — SIGNIFICANT CHANGE UP (ref 0–6)
ESTIMATED AVERAGE GLUCOSE: 114 MG/DL — SIGNIFICANT CHANGE UP (ref 68–114)
FERRITIN SERPL-MCNC: 122 NG/ML — SIGNIFICANT CHANGE UP (ref 30–400)
FOLATE SERPL-MCNC: 11.5 NG/ML — SIGNIFICANT CHANGE UP
GLUCOSE BLDC GLUCOMTR-MCNC: 116 MG/DL — HIGH (ref 70–99)
GLUCOSE BLDC GLUCOMTR-MCNC: 148 MG/DL — HIGH (ref 70–99)
GLUCOSE SERPL-MCNC: 108 MG/DL — HIGH (ref 70–99)
HCT VFR BLD CALC: 31.6 % — LOW (ref 39–50)
HGB BLD-MCNC: 10.6 G/DL — LOW (ref 13–17)
IMM GRANULOCYTES NFR BLD AUTO: 0.2 % — SIGNIFICANT CHANGE UP (ref 0–0.9)
IRON SATN MFR SERPL: 35 % — SIGNIFICANT CHANGE UP (ref 16–55)
IRON SATN MFR SERPL: 76 UG/DL — SIGNIFICANT CHANGE UP (ref 45–165)
LYMPHOCYTES # BLD AUTO: 1.29 K/UL — SIGNIFICANT CHANGE UP (ref 1–3.3)
LYMPHOCYTES # BLD AUTO: 24.4 % — SIGNIFICANT CHANGE UP (ref 13–44)
MAGNESIUM SERPL-MCNC: 2 MG/DL — SIGNIFICANT CHANGE UP (ref 1.6–2.6)
MCHC RBC-ENTMCNC: 31.5 PG — SIGNIFICANT CHANGE UP (ref 27–34)
MCHC RBC-ENTMCNC: 33.5 GM/DL — SIGNIFICANT CHANGE UP (ref 32–36)
MCV RBC AUTO: 94 FL — SIGNIFICANT CHANGE UP (ref 80–100)
MONOCYTES # BLD AUTO: 0.5 K/UL — SIGNIFICANT CHANGE UP (ref 0–0.9)
MONOCYTES NFR BLD AUTO: 9.5 % — SIGNIFICANT CHANGE UP (ref 2–14)
NEUTROPHILS # BLD AUTO: 3.3 K/UL — SIGNIFICANT CHANGE UP (ref 1.8–7.4)
NEUTROPHILS NFR BLD AUTO: 62.5 % — SIGNIFICANT CHANGE UP (ref 43–77)
NRBC # BLD: 0 /100 WBCS — SIGNIFICANT CHANGE UP (ref 0–0)
PHOSPHATE SERPL-MCNC: 5 MG/DL — HIGH (ref 2.5–4.5)
PLATELET # BLD AUTO: 187 K/UL — SIGNIFICANT CHANGE UP (ref 150–400)
POTASSIUM SERPL-MCNC: 3.5 MMOL/L — SIGNIFICANT CHANGE UP (ref 3.5–5.3)
POTASSIUM SERPL-SCNC: 3.5 MMOL/L — SIGNIFICANT CHANGE UP (ref 3.5–5.3)
PROT SERPL-MCNC: 6.6 G/DL — SIGNIFICANT CHANGE UP (ref 6–8.3)
RBC # BLD: 3.36 M/UL — LOW (ref 4.2–5.8)
RBC # FLD: 11.9 % — SIGNIFICANT CHANGE UP (ref 10.3–14.5)
RH IG SCN BLD-IMP: POSITIVE — SIGNIFICANT CHANGE UP
SODIUM SERPL-SCNC: 142 MMOL/L — SIGNIFICANT CHANGE UP (ref 135–145)
TIBC SERPL-MCNC: 220 UG/DL — SIGNIFICANT CHANGE UP (ref 220–430)
TRANSFERRIN SERPL-MCNC: 182 MG/DL — LOW (ref 200–360)
UIBC SERPL-MCNC: 144 UG/DL — SIGNIFICANT CHANGE UP (ref 110–370)
URATE UR-MCNC: 21.6 MG/DL — SIGNIFICANT CHANGE UP
VIT B12 SERPL-MCNC: 367 PG/ML — SIGNIFICANT CHANGE UP (ref 232–1245)
WBC # BLD: 5.28 K/UL — SIGNIFICANT CHANGE UP (ref 3.8–10.5)
WBC # FLD AUTO: 5.28 K/UL — SIGNIFICANT CHANGE UP (ref 3.8–10.5)

## 2024-05-11 PROCEDURE — 83550 IRON BINDING TEST: CPT

## 2024-05-11 PROCEDURE — 82746 ASSAY OF FOLIC ACID SERUM: CPT

## 2024-05-11 PROCEDURE — 80053 COMPREHEN METABOLIC PANEL: CPT

## 2024-05-11 PROCEDURE — 80048 BASIC METABOLIC PNL TOTAL CA: CPT

## 2024-05-11 PROCEDURE — 86900 BLOOD TYPING SEROLOGIC ABO: CPT

## 2024-05-11 PROCEDURE — 82607 VITAMIN B-12: CPT

## 2024-05-11 PROCEDURE — 82550 ASSAY OF CK (CPK): CPT

## 2024-05-11 PROCEDURE — 84156 ASSAY OF PROTEIN URINE: CPT

## 2024-05-11 PROCEDURE — 84466 ASSAY OF TRANSFERRIN: CPT

## 2024-05-11 PROCEDURE — 84540 ASSAY OF URINE/UREA-N: CPT

## 2024-05-11 PROCEDURE — 99285 EMERGENCY DEPT VISIT HI MDM: CPT

## 2024-05-11 PROCEDURE — 82962 GLUCOSE BLOOD TEST: CPT

## 2024-05-11 PROCEDURE — 99239 HOSP IP/OBS DSCHRG MGMT >30: CPT | Mod: GC

## 2024-05-11 PROCEDURE — 76775 US EXAM ABDO BACK WALL LIM: CPT

## 2024-05-11 PROCEDURE — 83735 ASSAY OF MAGNESIUM: CPT

## 2024-05-11 PROCEDURE — 84560 ASSAY OF URINE/URIC ACID: CPT

## 2024-05-11 PROCEDURE — 82728 ASSAY OF FERRITIN: CPT

## 2024-05-11 PROCEDURE — 83540 ASSAY OF IRON: CPT

## 2024-05-11 PROCEDURE — 86850 RBC ANTIBODY SCREEN: CPT

## 2024-05-11 PROCEDURE — 84100 ASSAY OF PHOSPHORUS: CPT

## 2024-05-11 PROCEDURE — 36415 COLL VENOUS BLD VENIPUNCTURE: CPT

## 2024-05-11 PROCEDURE — 85025 COMPLETE CBC W/AUTO DIFF WBC: CPT

## 2024-05-11 PROCEDURE — 84300 ASSAY OF URINE SODIUM: CPT

## 2024-05-11 PROCEDURE — 71045 X-RAY EXAM CHEST 1 VIEW: CPT

## 2024-05-11 PROCEDURE — 83036 HEMOGLOBIN GLYCOSYLATED A1C: CPT

## 2024-05-11 PROCEDURE — 81001 URINALYSIS AUTO W/SCOPE: CPT

## 2024-05-11 PROCEDURE — 82043 UR ALBUMIN QUANTITATIVE: CPT

## 2024-05-11 PROCEDURE — 86901 BLOOD TYPING SEROLOGIC RH(D): CPT

## 2024-05-11 PROCEDURE — 84484 ASSAY OF TROPONIN QUANT: CPT

## 2024-05-11 PROCEDURE — 82570 ASSAY OF URINE CREATININE: CPT

## 2024-05-11 RX ADMIN — Medication 81 MILLIGRAM(S): at 11:07

## 2024-05-11 RX ADMIN — CALCITRIOL 0.5 MICROGRAM(S): 0.5 CAPSULE ORAL at 11:08

## 2024-05-11 RX ADMIN — Medication 20 MILLIGRAM(S): at 13:26

## 2024-05-11 RX ADMIN — CARVEDILOL PHOSPHATE 25 MILLIGRAM(S): 80 CAPSULE, EXTENDED RELEASE ORAL at 06:27

## 2024-05-11 RX ADMIN — HEPARIN SODIUM 5000 UNIT(S): 5000 INJECTION INTRAVENOUS; SUBCUTANEOUS at 06:27

## 2024-05-11 RX ADMIN — Medication 100 MILLIGRAM(S): at 06:27

## 2024-05-11 RX ADMIN — ISOSORBIDE MONONITRATE 30 MILLIGRAM(S): 60 TABLET, EXTENDED RELEASE ORAL at 11:08

## 2024-05-11 RX ADMIN — Medication 100 MILLIGRAM(S): at 13:25

## 2024-05-11 RX ADMIN — AMLODIPINE BESYLATE 5 MILLIGRAM(S): 2.5 TABLET ORAL at 06:27

## 2024-05-11 NOTE — DISCHARGE NOTE NURSING/CASE MANAGEMENT/SOCIAL WORK - NSDCPEFALRISK_GEN_ALL_CORE
For information on Fall & Injury Prevention, visit: [email or web address] OR  [email or web address] OR  https://www.cdc.gov/steadi/patient.html

## 2024-05-11 NOTE — DISCHARGE NOTE NURSING/CASE MANAGEMENT/SOCIAL WORK - NSDCFUADDAPPT_GEN_ALL_CORE_FT
Please follow-up with your nephrologist, Dr. Faust within 1 week of discharge. Her office will call you to schedule an appointment. You may also call [phone] to schedule an appointment.

## 2024-05-11 NOTE — DISCHARGE NOTE NURSING/CASE MANAGEMENT/SOCIAL WORK - PATIENT PORTAL LINK FT
You can access the FollowMyHealth Patient Portal offered by [facility] by registering at the following website: [email or web address]. By joining [facility]’s FollowMyHealth portal, you will also be able to view your health information using other applications (apps) compatible with our system.

## 2024-05-13 ENCOUNTER — APPOINTMENT (OUTPATIENT)
Dept: NEPHROLOGY | Facility: CLINIC | Age: 54
End: 2024-05-13
Payer: COMMERCIAL

## 2024-05-13 VITALS — HEART RATE: 77 BPM | SYSTOLIC BLOOD PRESSURE: 146 MMHG | DIASTOLIC BLOOD PRESSURE: 70 MMHG

## 2024-05-13 DIAGNOSIS — N18.4 CHRONIC KIDNEY DISEASE, STAGE 4 (SEVERE): ICD-10-CM

## 2024-05-13 DIAGNOSIS — F41.8 OTHER SPECIFIED ANXIETY DISORDERS: ICD-10-CM

## 2024-05-13 DIAGNOSIS — R80.9 PROTEINURIA, UNSPECIFIED: ICD-10-CM

## 2024-05-13 DIAGNOSIS — Z86.39 PERSONAL HISTORY OF OTHER ENDOCRINE, NUTRITIONAL AND METABOLIC DISEASE: ICD-10-CM

## 2024-05-13 DIAGNOSIS — E79.0 HYPERURICEMIA W/OUT SIGNS OF INFLAMMATORY ARTHRITIS AND TOPHACEOUS DISEASE: ICD-10-CM

## 2024-05-13 DIAGNOSIS — E11.9 TYPE 2 DIABETES MELLITUS W/OUT COMPLICATIONS: ICD-10-CM

## 2024-05-13 PROCEDURE — 99496 TRANSJ CARE MGMT HIGH F2F 7D: CPT

## 2024-05-13 RX ORDER — TORSEMIDE 10 MG/1
10 TABLET ORAL DAILY
Qty: 90 | Refills: 3 | Status: ACTIVE | COMMUNITY
Start: 2019-08-01 | End: 1900-01-01

## 2024-05-13 RX ORDER — AMLODIPINE BESYLATE 10 MG/1
10 TABLET ORAL DAILY
Qty: 90 | Refills: 3 | Status: ACTIVE | COMMUNITY
Start: 2017-06-06 | End: 1900-01-01

## 2024-05-13 RX ORDER — ISOSORBIDE MONONITRATE 30 MG/1
30 TABLET, EXTENDED RELEASE ORAL DAILY
Qty: 90 | Refills: 3 | Status: ACTIVE | COMMUNITY
Start: 1900-01-01 | End: 1900-01-01

## 2024-05-13 RX ORDER — LOSARTAN POTASSIUM 50 MG/1
50 TABLET, FILM COATED ORAL DAILY
Qty: 90 | Refills: 3 | Status: DISCONTINUED | COMMUNITY
End: 2024-05-13

## 2024-05-13 NOTE — HISTORY OF PRESENT ILLNESS
[FreeTextEntry1] : 54 yo M with CKD 4, proteinuria, HTN and DMT2, for follow up evaluation. Inpt. [facility] last week with accelerated HTN- improved with med adjustments. CKD 5, but not uremic completing Tx evaluation at [facility]- wife considering donating her kidney has appt with vascular team Compliant with meds and nasal CPAP.  No NSAID use Denies HA, CP, SOB, dizziness. Frothy urine but no flank pain, dysuria or hematuria  PCP: Dr. Corwin Foster endo: Dr. Zenaida Macias  1/7/21: Reports feeling fleeting episodes of left sided numbness lasting up to a minute.  Had echo and duplex carotid US done on 12/31 for PCP.  Full hospital workup 7/2021 negative. Self stopped plavix, only on baby aspirin. Denies symptoms since admission.  12/2019: Optometry revealed mild proliferative changes due to DM, f/u 1 year.  PMH:  In [facility] May 2019 for accelerated HTN and found to have STAN- with increase in creat to 2.4 from 1.79 baseline. Is aware that he had not been focused on BP and also A1C elevated-

## 2024-05-13 NOTE — PHYSICAL EXAM
[General Appearance - Alert] : alert [General Appearance - In No Acute Distress] : in no acute distress [Auscultation Breath Sounds / Voice Sounds] : lungs were clear to auscultation bilaterally [Heart Rate And Rhythm] : heart rate was normal and rhythm regular [Heart Sounds] : normal S1 and S2 [No CVA Tenderness] : no ~M costovertebral angle tenderness [] : no rash [No Focal Deficits] : no focal deficits [Oriented To Time, Place, And Person] : oriented to person, place, and time [Impaired Insight] : insight and judgment were intact [Affect] : the affect was normal [FreeTextEntry1] : trace b/l LE edema

## 2024-05-13 NOTE — ASSESSMENT
[FreeTextEntry1] : all lab data was reviewed with patient in detail from EHR and HIE 54 yo man with CKD 5, HTN, proteinuria, DMT2, anxiety with depression, recently in [facility] -CKD 5- detailed discussion re need to push forward with living donor kidney transplant if her can find a donor-  will need to prepare for HD if unable to secure over next few weeks- If able, will defer AV access since would anticipate Tx this year reviewed RRT modalities- he feels that he is best suited for in center HD for now- does not want to pursue home therapies at this time monitoring BP; limit protein intake; avoiding NSAIDs.  --HTN- BP  146/70- above goal but better than last week increase amlodipine to 10 mg daily- Rx sent --proteinuria-- losartan dc'd for elevated creat- continue to hold pt aware of need for RRT probably within the next few months --DM-  A1C controlled on ozempic- follow up with endo.  --ALMAS - c/w CPAP and weight loss efforts. --HLD - c/w high dose statin. LDL remains above goal from prior determination- will f/u repeat, consider additional agent --hyperuricemia - uric acid good -- secondary hyperparathyroidism - c/w calcitriol 0.5mcg daily --acute stress reaction-  resolved  f/u repeat labs 2 weeks TTM to review then OV here 2 months sooner as needed

## 2024-05-16 DIAGNOSIS — D63.1 ANEMIA IN CHRONIC KIDNEY DISEASE: ICD-10-CM

## 2024-05-16 DIAGNOSIS — Z79.899 OTHER LONG TERM (CURRENT) DRUG THERAPY: ICD-10-CM

## 2024-05-16 DIAGNOSIS — R77.8 OTHER SPECIFIED ABNORMALITIES OF PLASMA PROTEINS: ICD-10-CM

## 2024-05-16 DIAGNOSIS — N18.4 CHRONIC KIDNEY DISEASE, STAGE 4 (SEVERE): ICD-10-CM

## 2024-05-16 DIAGNOSIS — I25.2 OLD MYOCARDIAL INFARCTION: ICD-10-CM

## 2024-05-16 DIAGNOSIS — I12.9 HYPERTENSIVE CHRONIC KIDNEY DISEASE WITH STAGE 1 THROUGH STAGE 4 CHRONIC KIDNEY DISEASE, OR UNSPECIFIED CHRONIC KIDNEY DISEASE: ICD-10-CM

## 2024-05-16 DIAGNOSIS — G47.33 OBSTRUCTIVE SLEEP APNEA (ADULT) (PEDIATRIC): ICD-10-CM

## 2024-05-16 DIAGNOSIS — Z79.85 LONG-TERM (CURRENT) USE OF INJECTABLE NON-INSULIN ANTIDIABETIC DRUGS: ICD-10-CM

## 2024-05-16 DIAGNOSIS — E11.22 TYPE 2 DIABETES MELLITUS WITH DIABETIC CHRONIC KIDNEY DISEASE: ICD-10-CM

## 2024-05-16 DIAGNOSIS — Z79.82 LONG TERM (CURRENT) USE OF ASPIRIN: ICD-10-CM

## 2024-05-16 DIAGNOSIS — E78.5 HYPERLIPIDEMIA, UNSPECIFIED: ICD-10-CM

## 2024-05-16 DIAGNOSIS — I16.1 HYPERTENSIVE EMERGENCY: ICD-10-CM

## 2024-05-16 DIAGNOSIS — N17.9 ACUTE KIDNEY FAILURE, UNSPECIFIED: ICD-10-CM

## 2024-05-16 DIAGNOSIS — Z99.89 DEPENDENCE ON OTHER ENABLING MACHINES AND DEVICES: ICD-10-CM

## 2024-05-16 DIAGNOSIS — Z76.82 AWAITING ORGAN TRANSPLANT STATUS: ICD-10-CM

## 2024-05-23 ENCOUNTER — APPOINTMENT (OUTPATIENT)
Dept: VASCULAR SURGERY | Facility: CLINIC | Age: 54
End: 2024-05-23
Payer: COMMERCIAL

## 2024-05-23 DIAGNOSIS — N18.5 CHRONIC KIDNEY DISEASE, STAGE 5: ICD-10-CM

## 2024-05-23 PROCEDURE — 93986 DUP-SCAN HEMO COMPL UNI STD: CPT

## 2024-05-23 PROCEDURE — 99204 OFFICE O/P NEW MOD 45 MIN: CPT

## 2024-05-24 NOTE — HISTORY OF PRESENT ILLNESS
[FreeTextEntry1] : 53yoM with a past medical history of HTN, HLD, DM, CKD4 (follows with Dr. Faust, on transplant list), NSTEMI, ALMAS on CPAP, secondary hyperparathyroidism who was recently admitted due to hypertension to 's in ED, admitted for acute renal failure on CKD. Today he presents for an evaluation for a permanent hemodialysis access creation. He is right-hand dominant.

## 2024-05-24 NOTE — ADDENDUM
[FreeTextEntry1] : This note was written by Mary MESSER, acting as a scribe for Dr. Dominic De Leon.  I, Dr. Dominic De Leon, have read and attest that all the information, medical decision-making, and discharge instructions within are true and accurate.  I agree with the above. Mr. Ryan Amaya is a 53M (right hand dominant) w/ HTN, HLD, DM, prior NSTEMI, ALMAS on CPAP, secondary hyperparathyroidism and CKD who is on the transplant list, referred for possible AV access. He has palpable brachial and radial pulse. Normal Maik's test. Decent vein on vein mapping. In discussion with patient and his nephrologist Dr. Duff we will wait on creating AV access at this time, but if he finds out his family/relative are unable to donate then we can proceed. We have given him our office information. In interim, I have instructed him to have LUE limb precautions (no IVs, blood draws or BP cuffs in LUE).    I, Dr. Dominic De Leon, personally performed the evaluation and management (E/M) services for this new patient.  That E/M includes conducting the initial examination, assessing all conditions, and establishing the plan of care.  Today, my ACP, Mary MESSER, was here to observe my evaluation and management services for this patient to be followed going forward.

## 2024-05-24 NOTE — PHYSICAL EXAM
[Respiratory Effort] : normal respiratory effort [Normal Heart Sounds] : normal heart sounds [2+] : left 2+ [Alert] : alert [Calm] : calm [Abdomen Tenderness] : ~T ~M No abdominal tenderness [de-identified] : WN/WD, NAD [de-identified] : NC/AT [de-identified] : supple [de-identified] : +FROM

## 2024-05-24 NOTE — PROCEDURE
[FreeTextEntry1] : Venous mapping was done in the office that demonstrated cephalic/basilic veins that are adequate for dialysis conduit

## 2024-05-24 NOTE — ASSESSMENT
[Other: _____] : [unfilled] [FreeTextEntry1] : 53yoM with CKD, recent admission due to hypertensive urgency and STAN, presents for an evaluation of AVF creation. He is on kidney transplant list, and he is not sure when he might need a dialysis initiation. On exam, palpable bilateral radial/brachial pulses, no neurological deficits, right hand dominant. Venous mapping was done in the office that demonstrated cephalic/basilic veins that are adequate for dialysis conduit. We discussed the procedure of AVF creation, its RABs. We will discuss with Dr. Fauts regarding a timing of the access creation given the fact that patient is awaiting a kidney transplant.

## 2024-05-30 NOTE — ED ADULT TRIAGE NOTE - SPO2 (%)
[facility]   Electrophysiology Consultation   Date: 5/30/2024  Reason for Consultation: SVT   Consult Requesting Physician: Dr.Lester Mendoza     CC: SVT   HPI: Elvia Shabazz is a 52 y.o. female history of SVT, HTN, COPD and hypercholesteremia     11/10/2022 holter placed for complaints of heart racing reported one run of PAT/SVT.     12/17/2023 was admitted to [facility] following a syncopal episode. Complained of diaphoresis and erratic pulse. Presumed to be related to possible SVT episode.     Elvia presents to the office today as a new patient for racing heart. Has complaints of daily heart racing. Reports this has been ongoing for the past two years. Admits sometimes she has to lie down to terminate medications. Episodes have improved since starting Lopressor. At time of syncopal episode, she was sweating, \"cold and clammy\" per brother, and experiencing racing heart.     Assessment and plan:   - SVT   Patient reports episodes of palpitation and tachycardia with sudden onset and offset.  She is symptomatic with these episodes of feeling palpitation, sometimes lightheadedness and dizziness.   She has had 1 episodes of syncope reportedly related to palpitation and possible SVT.    ECG today shows sinus rhythm   Brief AT noted on 11/10/2022   Continue Lopressor 50 mg BID for rate control       DD include AT or other forms of SVT including AVNRT.      We discussed diagnostic options including EP study and ablation.   The risks, benefits and alternatives of the ablation procedure were discussed with the patient. The risks including, but not limited to, the risks of bleeding, infection, radiation exposure, injury to vascular, cardiac and surrounding structures (including pneumothorax), stroke, cardiac perforation, tamponade, need for emergent open heart surgery, need for pacemaker implantation, Injury to the phrenic nerve, injury to the esophagus, myocardial infarction and death were discussed in detail.  97

## 2024-07-16 ENCOUNTER — APPOINTMENT (OUTPATIENT)
Dept: NEPHROLOGY | Facility: CLINIC | Age: 54
End: 2024-07-16

## 2024-07-25 ENCOUNTER — APPOINTMENT (OUTPATIENT)
Dept: NEPHROLOGY | Facility: CLINIC | Age: 54
End: 2024-07-25
Payer: COMMERCIAL

## 2024-07-25 DIAGNOSIS — N18.5 CHRONIC KIDNEY DISEASE, STAGE 5: ICD-10-CM

## 2024-07-25 PROCEDURE — 99215 OFFICE O/P EST HI 40 MIN: CPT

## 2024-07-25 PROCEDURE — G2211 COMPLEX E/M VISIT ADD ON: CPT

## 2024-07-25 NOTE — ASSESSMENT
[FreeTextEntry1] : #CKD stage V - Cause: Likely diabetic nephropathy.  - Last labs in May reviewed. Had blood work prior to today's visit pending. No signs of uremia on exam. Overall feeling well.  - I would like for him to have access. I am not sure at this time if he is listed for transplant. He states that his son is a potential donor who is undergoing work up for donation. At this time patient would like to hold off on having permanent access till his transplant status is more clear. We also went over the signs of uremia and if he experiences it he is to notify us or go to ED. This sx include nausea, vomiting, Weight loss, Lack of appetite.  - GDMT: Ozempic   #HTN/Volume - Euvolemic. On Torsemide 10mg daily.  - BP regimen: Hydralazine 100 TID< Isosorbide mononitrate ER 30mg, Amlodipine 10mg daily, Coreg 25 BID.  - In office BP elevated today 166/72. If continues to stay high, he is to notify us.   #CKD-MBD - Phos, Calcium okay.  - On Calcitriol.   #Anemia - hg 10. Will await labs. May need IV iron.  Follow up in 6 weeks.

## 2024-07-25 NOTE — REVIEW OF SYSTEMS
[Fever] : no fever [Chills] : no chills [Feeling Poorly] : not feeling poorly [Heart Rate Is Slow] : the heart rate was not slow [Heart Rate Is Fast] : the heart rate was not fast [Chest Pain] : no chest pain [Palpitations] : no palpitations [Shortness Of Breath] : no shortness of breath [Wheezing] : no wheezing [Cough] : no cough [SOB on Exertion] : no shortness of breath during exertion [Abdominal Pain] : no abdominal pain [Vomiting] : no vomiting [Constipation] : no constipation [Diarrhea] : no diarrhea [Dysuria] : no dysuria [Incontinence] : no incontinence [Hesitancy] : no urinary hesitancy [Confused] : no confusion

## 2024-07-25 NOTE — PHYSICAL EXAM
[General Appearance - Alert] : alert [General Appearance - In No Acute Distress] : in no acute distress [Sclera] : the sclera and conjunctiva were normal [Neck Appearance] : the appearance of the neck was normal [] : no respiratory distress [Respiration, Rhythm And Depth] : normal respiratory rhythm and effort [Auscultation Breath Sounds / Voice Sounds] : lungs were clear to auscultation bilaterally [Apical Impulse] : the apical impulse was normal [Heart Rate And Rhythm] : heart rate was normal and rhythm regular [Heart Sounds] : normal S1 and S2 [Heart Sounds Gallop] : no gallops [Bowel Sounds] : normal bowel sounds [Abdomen Soft] : soft [Abdomen Tenderness] : non-tender

## 2024-07-25 NOTE — HISTORY OF PRESENT ILLNESS
[FreeTextEntry1] : Reason for follow up: CKD stage V Nephrologist: Dr. Faust last seen May 2024  Last visit May 2024: He was last seen as a follow up post discharge for accelerated HTN. He was also sent to vascular team for permanent access creation.   Interval Events - Saw vascular team. AVF was put on hold given unsure if he is getting renal transplant soon.  - Last labs in May. CR ~6. BUN 70. Hg 10.6. He had labs done before today's visit.  - Otherwise feeling well. Making urine. No complaints.

## 2024-07-30 LAB
ALBUMIN SERPL ELPH-MCNC: 4 G/DL
ANION GAP SERPL CALC-SCNC: 13 MMOL/L
APPEARANCE: CLEAR
BACTERIA: NEGATIVE /HPF
BILIRUBIN URINE: NEGATIVE
BLOOD URINE: NEGATIVE
BUN SERPL-MCNC: 58 MG/DL
CALCIUM SERPL-MCNC: 9.5 MG/DL
CALCIUM SERPL-MCNC: 9.5 MG/DL
CAST: 0 /LPF
CHLORIDE SERPL-SCNC: 103 MMOL/L
CO2 SERPL-SCNC: 25 MMOL/L
COLOR: YELLOW
CREAT SERPL-MCNC: 6.55 MG/DL
CREAT SPEC-SCNC: 94 MG/DL
CYSTATIN C SERPL-MCNC: 4.15 MG/L
EGFR: 9 ML/MIN/1.73M2
EPITHELIAL CELLS: 0 /HPF
ESTIMATED AVERAGE GLUCOSE: 103 MG/DL
GFR/BSA.PRED SERPLBLD CYS-BASED-ARV: 12 ML/MIN/1.73M2
GLUCOSE QUALITATIVE U: NEGATIVE MG/DL
GLUCOSE SERPL-MCNC: 93 MG/DL
HBA1C MFR BLD HPLC: 5.2 %
HCT VFR BLD CALC: 29.2 %
HGB BLD-MCNC: 9.1 G/DL
IRON SATN MFR SERPL: 20 %
IRON SERPL-MCNC: 47 UG/DL
KETONES URINE: NEGATIVE MG/DL
LEUKOCYTE ESTERASE URINE: NEGATIVE
MCHC RBC-ENTMCNC: 31.2 GM/DL
MCHC RBC-ENTMCNC: 31.4 PG
MCV RBC AUTO: 100.7 FL
MICROALBUMIN 24H UR DL<=1MG/L-MCNC: 41 MG/DL
MICROALBUMIN/CREAT 24H UR-RTO: 434 MG/G
MICROSCOPIC-UA: NORMAL
NITRITE URINE: NEGATIVE
PARATHYROID HORMONE INTACT: 164 PG/ML
PH URINE: 6
PHOSPHATE SERPL-MCNC: 4.1 MG/DL
PLATELET # BLD AUTO: 202 K/UL
POTASSIUM SERPL-SCNC: 4.1 MMOL/L
PROTEIN URINE: 100 MG/DL
RBC # BLD: 2.9 M/UL
RBC # FLD: 12.9 %
RED BLOOD CELLS URINE: 0 /HPF
SODIUM SERPL-SCNC: 141 MMOL/L
SPECIFIC GRAVITY URINE: 1.01
TIBC SERPL-MCNC: 231 UG/DL
UIBC SERPL-MCNC: 184 UG/DL
UROBILINOGEN URINE: 0.2 MG/DL
WBC # FLD AUTO: 5.47 K/UL
WHITE BLOOD CELLS URINE: 0 /HPF

## 2024-08-13 NOTE — ED ADULT TRIAGE NOTE - PAIN: PRESENCE, MLM
BMI Counseling: Body mass index is 30.08 kg/m². The BMI is above normal. Nutrition recommendations include reducing portion sizes, decreasing overall calorie intake, 3-5 servings of fruits/vegetables daily, reducing fast food intake, consuming healthier snacks, decreasing soda and/or juice intake, moderation in carbohydrate intake, increasing intake of lean protein, reducing intake of saturated fat and trans fat, and reducing intake of cholesterol. Exercise recommendations include moderate aerobic physical activity for 150 minutes/week, exercising 3-5 times per week, and strength training exercises.  
denies pain/discomfort

## 2024-08-19 ENCOUNTER — TRANSCRIPTION ENCOUNTER (OUTPATIENT)
Age: 54
End: 2024-08-19

## 2024-08-19 ENCOUNTER — OUTPATIENT (OUTPATIENT)
Dept: OUTPATIENT SERVICES | Facility: HOSPITAL | Age: 54
LOS: 1 days | End: 2024-08-19
Payer: COMMERCIAL

## 2024-08-19 ENCOUNTER — APPOINTMENT (OUTPATIENT)
Dept: INFUSION THERAPY | Facility: CLINIC | Age: 54
End: 2024-08-19

## 2024-08-19 VITALS
DIASTOLIC BLOOD PRESSURE: 68 MMHG | TEMPERATURE: 98 F | OXYGEN SATURATION: 99 % | HEIGHT: 69 IN | SYSTOLIC BLOOD PRESSURE: 121 MMHG | RESPIRATION RATE: 18 BRPM | WEIGHT: 190.04 LBS | HEART RATE: 69 BPM

## 2024-08-19 DIAGNOSIS — Z90.49 ACQUIRED ABSENCE OF OTHER SPECIFIED PARTS OF DIGESTIVE TRACT: Chronic | ICD-10-CM

## 2024-08-19 DIAGNOSIS — D64.9 ANEMIA, UNSPECIFIED: ICD-10-CM

## 2024-08-19 PROCEDURE — 96365 THER/PROPH/DIAG IV INF INIT: CPT

## 2024-08-19 RX ORDER — IRON SUCROSE 20 MG/ML
200 INJECTION, SOLUTION INTRAVENOUS ONCE
Refills: 0 | Status: COMPLETED | OUTPATIENT
Start: 2024-08-19 | End: 2024-08-19

## 2024-08-19 RX ADMIN — IRON SUCROSE 110 MILLIGRAM(S): 20 INJECTION, SOLUTION INTRAVENOUS at 16:51

## 2024-08-19 RX ADMIN — IRON SUCROSE 200 MILLIGRAM(S): 20 INJECTION, SOLUTION INTRAVENOUS at 17:51

## 2024-08-21 ENCOUNTER — APPOINTMENT (OUTPATIENT)
Dept: INFUSION THERAPY | Facility: CLINIC | Age: 54
End: 2024-08-21

## 2024-08-23 ENCOUNTER — APPOINTMENT (OUTPATIENT)
Dept: INFUSION THERAPY | Facility: CLINIC | Age: 54
End: 2024-08-23

## 2024-08-23 ENCOUNTER — OUTPATIENT (OUTPATIENT)
Dept: OUTPATIENT SERVICES | Facility: HOSPITAL | Age: 54
LOS: 1 days | End: 2024-08-23
Payer: COMMERCIAL

## 2024-08-23 VITALS
WEIGHT: 190.04 LBS | TEMPERATURE: 99 F | SYSTOLIC BLOOD PRESSURE: 134 MMHG | DIASTOLIC BLOOD PRESSURE: 67 MMHG | HEIGHT: 60.9 IN | OXYGEN SATURATION: 97 % | RESPIRATION RATE: 18 BRPM | HEART RATE: 76 BPM

## 2024-08-23 DIAGNOSIS — Z90.49 ACQUIRED ABSENCE OF OTHER SPECIFIED PARTS OF DIGESTIVE TRACT: Chronic | ICD-10-CM

## 2024-08-23 DIAGNOSIS — D64.9 ANEMIA, UNSPECIFIED: ICD-10-CM

## 2024-08-23 PROCEDURE — 96365 THER/PROPH/DIAG IV INF INIT: CPT

## 2024-08-23 RX ORDER — IRON SUCROSE 20 MG/ML
200 INJECTION, SOLUTION INTRAVENOUS ONCE
Refills: 0 | Status: COMPLETED | OUTPATIENT
Start: 2024-08-23 | End: 2024-08-23

## 2024-08-23 RX ADMIN — IRON SUCROSE 200 MILLIGRAM(S): 20 INJECTION, SOLUTION INTRAVENOUS at 17:28

## 2024-08-23 RX ADMIN — IRON SUCROSE 110 MILLIGRAM(S): 20 INJECTION, SOLUTION INTRAVENOUS at 16:24

## 2024-08-26 ENCOUNTER — APPOINTMENT (OUTPATIENT)
Dept: INFUSION THERAPY | Facility: CLINIC | Age: 54
End: 2024-08-26

## 2024-08-26 ENCOUNTER — OUTPATIENT (OUTPATIENT)
Dept: OUTPATIENT SERVICES | Facility: HOSPITAL | Age: 54
LOS: 1 days | End: 2024-08-26
Payer: COMMERCIAL

## 2024-08-26 VITALS
RESPIRATION RATE: 17 BRPM | OXYGEN SATURATION: 98 % | TEMPERATURE: 99 F | SYSTOLIC BLOOD PRESSURE: 130 MMHG | HEART RATE: 74 BPM | DIASTOLIC BLOOD PRESSURE: 68 MMHG

## 2024-08-26 VITALS
SYSTOLIC BLOOD PRESSURE: 125 MMHG | RESPIRATION RATE: 18 BRPM | DIASTOLIC BLOOD PRESSURE: 70 MMHG | OXYGEN SATURATION: 97 % | TEMPERATURE: 98 F | HEART RATE: 73 BPM

## 2024-08-26 DIAGNOSIS — D64.9 ANEMIA, UNSPECIFIED: ICD-10-CM

## 2024-08-26 DIAGNOSIS — Z90.49 ACQUIRED ABSENCE OF OTHER SPECIFIED PARTS OF DIGESTIVE TRACT: Chronic | ICD-10-CM

## 2024-08-26 PROCEDURE — 96374 THER/PROPH/DIAG INJ IV PUSH: CPT

## 2024-08-26 RX ORDER — IRON SUCROSE 20 MG/ML
200 INJECTION, SOLUTION INTRAVENOUS ONCE
Refills: 0 | Status: COMPLETED | OUTPATIENT
Start: 2024-08-26 | End: 2024-08-26

## 2024-08-26 RX ADMIN — IRON SUCROSE 110 MILLIGRAM(S): 20 INJECTION, SOLUTION INTRAVENOUS at 16:20

## 2024-08-26 RX ADMIN — IRON SUCROSE 200 MILLIGRAM(S): 20 INJECTION, SOLUTION INTRAVENOUS at 17:20

## 2024-08-26 NOTE — DISCHARGE INSTRUCTIONS: GENERAL THERAPY - I ACKNOWLEDGE THAT I HAVE RECEIVED AND UNDERSTAND THE ABOVE INSTRUCTIONS AND AGREE TO BEING DISCHARGED TODAY
CC: \"IM NOT HAPPY. \"    HPI:  \"I DONT HAVE ANYTHING TO LIVE FOR. \"   PT REPORTED SUICIDAL IDEATION FOR THE PAST 2 WEEKS. NO HISTORY OF PREVIOUS SUICIDE ATTEMPTS. HERE IS BSMART NOTE:  Patient is a 62year old female who presented to the [facility] Emergency room with c/o \"having suicidal thoughts and I want to kill myself. My family doesn't care, so why should I. No one cares. Syeda Ramirez I don't have nothing to care for or about. \"   Patient verbalized that she is \"suicidal with a plan to take my whole bag of medications. \" Patient stated that she is stressed d/t having no place to live for the past week. Patient also stated that she \"was living in an apartment with 8 people, 2 dogs, and the least was revoked, because there were too many people living in the 2 bedroom apartment. \"   END OF NOTE    RECENTLY LIVING WITH A FRIEND BUT THEY HAD TO MOVE AND SINCE SHE HAS BEEN HOMELESS. RECEIVES DISABILITY FOR BACK PAIN AND REPORTED BPAD. SUBSTANCE USE:  COCAINE, CANNABIS, ALCOHOL USE DISORDER IN SUSTAINED REMISSION    Past Psychiatric History:  IP AT [facility] [address] PAST  [facility]. [facility] IN [place] FOR SEVERAL YEARS      Past Medical History:   Asthma, chronic, moderate persistent, with acute exacerbation      Asthmatic bronchitis without complication 67/0/2283    Bipolar 1 disorder, manic, mild ([address]      CAD (coronary artery disease)      Controlled type 2 diabetes mellitus without complication, without long-term current use of insulin (HCC)      Diabetes ([address]      Hyperlipidemia      Hypertension      Hypothyroidism      Liver disease          Current Medications:   metFORMIN (GLUCOPHAGE) 500 mg tablet Take 500 mg by mouth two (2) times daily (with meals).         aspirin delayed-release 81 mg tablet Take  by mouth daily.        ondansetron hcl (Zofran) 4 mg tablet Take 4 mg by mouth every eight (8) hours as needed for Nausea or Vomiting.        loperamide (IMODIUM) 2 mg capsule Take 2 mg by mouth four (4) times daily as needed.        promethazine (PHENERGAN) 12.5 mg tablet Take  by mouth every six (6) hours as needed for Nausea.        albuterol (PROVENTIL HFA, VENTOLIN HFA, PROAIR HFA) 90 mcg/actuation inhaler Take 1 Puff by inhalation every four (4) hours as needed for Wheezing. 1 Inhaler 3    rosuvastatin (CRESTOR) 20 mg tablet Take 20 mg by mouth daily.        cyanocobalamin 1,000 mcg tablet Take 1,000 mcg by mouth daily.        tiotropium bromide (Spiriva Respimat) 2.5 mcg/actuation inhaler Take 2 Puffs by inhalation daily. 1 Inhaler 5    albuterol (PROVENTIL HFA, VENTOLIN HFA, PROAIR HFA) 90 mcg/actuation inhaler Take 2 Puffs by inhalation every four (4) hours as needed.        aspirin-calcium carbonate 81 mg-300 mg calcium(777 mg) tab Take 81 mg by mouth daily.        atorvastatin (LIPITOR) 80 mg tablet Take 80 mg by mouth daily.        glucose blood VI test strips (ASCENSIA AUTODISC VI, ONE TOUCH ULTRA TEST VI) strip Test once a day for DX E11.9        budesonide-formoteroL (SYMBICORT) 160-4.5 mcg/actuation HFAA Take 2 Puffs by inhalation daily.        DULoxetine (CYMBALTA) 60 mg capsule Take 60 mg by mouth daily.        lamoTRIgine (LaMICtal) 200 mg tablet Take 200 mg by mouth two (2) times a day.        meclizine (ANTIVERT) 25 mg tablet Take 25 mg by mouth daily as needed.        QUEtiapine (SEROquel) 100 mg tablet Take 200 mg by mouth daily.        traZODone (DESYREL) 100 mg tablet Take 200 mg by mouth nightly.        venlafaxine (EFFEXOR) 75 mg tablet Take 150 mg by mouth daily.        levothyroxine (SYNTHROID) 75 mcg tablet Take 100 mcg by mouth Daily (before breakfast). Indications: a condition with low thyroid hormone levels        cyclobenzaprine (FLEXERIL) 10 mg tablet Take 10 mg by mouth two (2) times a day. Indications: MUSCLE SPASM        lisinopril (PRINIVIL, ZESTRIL) 5 mg tablet Take 5 mg by mouth daily.     Indications: HYPERTENSION        lovastatin (MEVACOR) 10 mg tablet Take 10 mg by mouth daily (after breakfast). Indications: HYPERCHOLESTEROLEMIA         Previous Medication Trials: Allergies:  CODEINE, CELEXA, TRAMADOL, OXYCODONE    Social History:  Housing: \"No place to live; been homeless for one week. \"  Legal Issues: Denied  Education: \"2 years college\"  Substance Abuse: Patient denied substance abuse; \"I have had pot, speed, or cocaine in more than 20 years, and last alcoholic drink was 57-63 years, ago. \"   Outpatient Care: \"Dr. Cesia Bhakta, can't remember last appointment\"  Inpatient Services: \"somewhere in [place], many years, ago\"  Contact/Support Person: \"Mayra Henry, friend\"  HAS WORKED AS A CNA IN THE PAST, WORKING AT [employer]  LAST WORKED 10 YEARS AGO. Objective:   Vitals: stable and within normal limits  Labs: CBC WNL, CR 1.4, UDS WNL, FSBS 122    ROS: PLEASE SEE INTERNISTS EVALUATION  PHYSICAL EXAM: PLEASE SEE INTERNISTS REPORT    Mental Status Exam:  Appearance: DISHEVELED, USES A WALKER TO AMBULATE  Behavior: COOPERATIVE, TEARFUL  Motor: no psychomotor agitation or retardation noted  Speech: NORMAL RATE AND VOLUME  Mood: IM DEPRESSED\"  Affect: DYSPHORIC, IRRITABLE  Thought Content: SI WITH A PLAN, NO HI/AVH  Thought Process: LINEAR AND GOAL ORIENTED  Orientation: A&O X 4  Insight/Judgment: LIMITED X 2    Assessment:  Axis I: BIPOLAR DISORDER, TYPE I, CURRENTLY DEPRESSED, COCAINE/ETOH/METH/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 Labs: EKG,HA1C, FLP  Meds: CONT HOME MEDS   BBW and side effects of all meds discussed and pt consented to treatment  Dispo: [facility] [address], [facility] IN [place]    Anticipated length of stay: 5-7 DAYS      1.  I certify that the patient needs 24 hours of medical supervision to improve the above conditions. 2. The current mental illness is classified as severe. 3. Outpatient services only are insufficient currently to treat this patient's current psychiatric condition. 4. There is continued need for psychiatric inpatient treatment to address the above condition. 5. I certify that current psychiatric treatment could be reasonably expected to improve the patient's condition. 6. I certify that the patient should expect to make improvements as a result of inpatient psychiatric services  7. The risks and benefits of treatment were discussed with the patient. Statement Selected

## 2024-08-28 ENCOUNTER — APPOINTMENT (OUTPATIENT)
Dept: INFUSION THERAPY | Facility: CLINIC | Age: 54
End: 2024-08-28

## 2024-08-28 ENCOUNTER — OUTPATIENT (OUTPATIENT)
Dept: OUTPATIENT SERVICES | Facility: HOSPITAL | Age: 54
LOS: 1 days | End: 2024-08-28
Payer: COMMERCIAL

## 2024-08-28 DIAGNOSIS — D64.9 ANEMIA, UNSPECIFIED: ICD-10-CM

## 2024-08-28 DIAGNOSIS — Z90.49 ACQUIRED ABSENCE OF OTHER SPECIFIED PARTS OF DIGESTIVE TRACT: Chronic | ICD-10-CM

## 2024-08-28 PROCEDURE — 96365 THER/PROPH/DIAG IV INF INIT: CPT

## 2024-08-28 RX ORDER — IRON SUCROSE 20 MG/ML
200 INJECTION, SOLUTION INTRAVENOUS ONCE
Refills: 0 | Status: COMPLETED | OUTPATIENT
Start: 2024-08-28 | End: 2024-08-28

## 2024-08-28 RX ADMIN — IRON SUCROSE 200 MILLIGRAM(S): 20 INJECTION, SOLUTION INTRAVENOUS at 17:20

## 2024-08-28 RX ADMIN — IRON SUCROSE 110 MILLIGRAM(S): 20 INJECTION, SOLUTION INTRAVENOUS at 16:20

## 2024-09-05 ENCOUNTER — APPOINTMENT (OUTPATIENT)
Dept: NEPHROLOGY | Facility: CLINIC | Age: 54
End: 2024-09-05
Payer: COMMERCIAL

## 2024-09-05 VITALS — DIASTOLIC BLOOD PRESSURE: 70 MMHG | SYSTOLIC BLOOD PRESSURE: 136 MMHG | HEART RATE: 78 BPM

## 2024-09-05 DIAGNOSIS — I10 ESSENTIAL (PRIMARY) HYPERTENSION: ICD-10-CM

## 2024-09-05 DIAGNOSIS — Z86.39 PERSONAL HISTORY OF OTHER ENDOCRINE, NUTRITIONAL AND METABOLIC DISEASE: ICD-10-CM

## 2024-09-05 DIAGNOSIS — E61.1 IRON DEFICIENCY: ICD-10-CM

## 2024-09-05 DIAGNOSIS — R80.9 PROTEINURIA, UNSPECIFIED: ICD-10-CM

## 2024-09-05 DIAGNOSIS — N18.5 CHRONIC KIDNEY DISEASE, STAGE 5: ICD-10-CM

## 2024-09-05 DIAGNOSIS — E11.9 TYPE 2 DIABETES MELLITUS W/OUT COMPLICATIONS: ICD-10-CM

## 2024-09-05 PROCEDURE — 99215 OFFICE O/P EST HI 40 MIN: CPT

## 2024-09-05 PROCEDURE — G2211 COMPLEX E/M VISIT ADD ON: CPT

## 2024-09-05 NOTE — HISTORY OF PRESENT ILLNESS
[FreeTextEntry1] : 53 yo man with CKD 5, HTN, DM, proteinuria for follow up evaluation. no N, V, metallic taste completed his IVFe infusions energy and appetite good no work issues will be going for colonoscopy in October, then she be active on Tx list hopes to receive living kidney -son cleared to donate a kidney- will be a SWAP- Denies flank pain, dysuria, hematuria or frothy urine  No CP, SOB   5/2024: Saw vascular team. AVF was put on hold given unsure if he is getting renal transplant soon.

## 2024-09-05 NOTE — PHYSICAL EXAM
[General Appearance - Alert] : alert [General Appearance - In No Acute Distress] : in no acute distress [Sclera] : the sclera and conjunctiva were normal [] : no respiratory distress [Respiration, Rhythm And Depth] : normal respiratory rhythm and effort [Auscultation Breath Sounds / Voice Sounds] : lungs were clear to auscultation bilaterally [Apical Impulse] : the apical impulse was normal [Heart Sounds] : normal S1 and S2 [Bowel Sounds] : normal bowel sounds [Abdomen Soft] : soft [Abdomen Tenderness] : non-tender [Heart Sounds Pericardial Friction Rub] : no pericardial rub [Edema] : there was no peripheral edema [Oriented To Time, Place, And Person] : oriented to person, place, and time [Impaired Insight] : insight and judgment were intact [Affect] : the affect was normal

## 2024-09-05 NOTE — ASSESSMENT
[FreeTextEntry1] : all lab data was reviewed with patient in detail from EHR 53 yo man with CKD 5, HTN, Fe- deficiency anemia- completed IVFe infusions - CKD stage 5-  not uremic detailed discussion re RRT- HD- expectations counseled to call if starts developing n, metallic taste or any malaise ideally goal is preemptive transplant if needs dialysis- would try to arrange as outpatient - HD catheter placement and then to HD center will maintain close follow up 7/2024 labs acceptable- will go for new labs now  -HTN- BP acceptable reviewed home BP monitoring technique: seated position, arm supported on table- 3 measurements 2-5 minutes apart- record lowest BP  instructed him to measure BID for 1 week each month- forward measurements -anemia- low Fe- completed course of IVFe- for repeat data  f/u 6 weeks

## 2024-09-06 LAB
25(OH)D3 SERPL-MCNC: 21.8 NG/ML
ALBUMIN SERPL ELPH-MCNC: 4 G/DL
ANION GAP SERPL CALC-SCNC: 14 MMOL/L
BUN SERPL-MCNC: 63 MG/DL
CALCIUM SERPL-MCNC: 9.4 MG/DL
CALCIUM SERPL-MCNC: 9.4 MG/DL
CHLORIDE SERPL-SCNC: 106 MMOL/L
CO2 SERPL-SCNC: 23 MMOL/L
CREAT SERPL-MCNC: 7.31 MG/DL
EGFR: 8 ML/MIN/1.73M2
ESTIMATED AVERAGE GLUCOSE: 94 MG/DL
GLUCOSE SERPL-MCNC: 102 MG/DL
HBA1C MFR BLD HPLC: 4.9 %
HCT VFR BLD CALC: 27.3 %
HGB BLD-MCNC: 8.6 G/DL
IRON SATN MFR SERPL: 31 %
IRON SERPL-MCNC: 62 UG/DL
MCHC RBC-ENTMCNC: 31.4 PG
MCHC RBC-ENTMCNC: 31.5 GM/DL
MCV RBC AUTO: 99.6 FL
PARATHYROID HORMONE INTACT: 124 PG/ML
PHOSPHATE SERPL-MCNC: 5.2 MG/DL
PLATELET # BLD AUTO: 197 K/UL
POTASSIUM SERPL-SCNC: 4.5 MMOL/L
RBC # BLD: 2.74 M/UL
RBC # FLD: 12.8 %
SODIUM SERPL-SCNC: 143 MMOL/L
TIBC SERPL-MCNC: 197 UG/DL
UIBC SERPL-MCNC: 136 UG/DL
WBC # FLD AUTO: 5.02 K/UL

## 2024-10-29 LAB
ALBUMIN SERPL ELPH-MCNC: 3.9 G/DL
ANION GAP SERPL CALC-SCNC: 15 MMOL/L
BUN SERPL-MCNC: 66 MG/DL
CALCIUM SERPL-MCNC: 9.5 MG/DL
CHLORIDE SERPL-SCNC: 108 MMOL/L
CO2 SERPL-SCNC: 22 MMOL/L
CREAT SERPL-MCNC: 7.71 MG/DL
EGFR: 8 ML/MIN/1.73M2
GLUCOSE SERPL-MCNC: 115 MG/DL
HCT VFR BLD CALC: 28 %
HGB BLD-MCNC: 8.9 G/DL
MAGNESIUM SERPL-MCNC: 2 MG/DL
MCHC RBC-ENTMCNC: 31.1 PG
MCHC RBC-ENTMCNC: 31.8 GM/DL
MCV RBC AUTO: 97.9 FL
PHOSPHATE SERPL-MCNC: 4.3 MG/DL
PLATELET # BLD AUTO: 207 K/UL
POTASSIUM SERPL-SCNC: 4.1 MMOL/L
RBC # BLD: 2.86 M/UL
RBC # FLD: 12.9 %
SODIUM SERPL-SCNC: 146 MMOL/L
WBC # FLD AUTO: 5.4 K/UL

## 2024-11-08 ENCOUNTER — APPOINTMENT (OUTPATIENT)
Dept: NEPHROLOGY | Facility: CLINIC | Age: 54
End: 2024-11-08
Payer: COMMERCIAL

## 2024-11-08 VITALS — SYSTOLIC BLOOD PRESSURE: 128 MMHG | HEART RATE: 76 BPM | DIASTOLIC BLOOD PRESSURE: 74 MMHG

## 2024-11-08 DIAGNOSIS — N18.5 CHRONIC KIDNEY DISEASE, STAGE 5: ICD-10-CM

## 2024-11-08 DIAGNOSIS — I10 ESSENTIAL (PRIMARY) HYPERTENSION: ICD-10-CM

## 2024-11-08 DIAGNOSIS — N25.81 SECONDARY HYPERPARATHYROIDISM OF RENAL ORIGIN: ICD-10-CM

## 2024-11-08 DIAGNOSIS — D63.1 CHRONIC KIDNEY DISEASE, STAGE 5: ICD-10-CM

## 2024-11-08 DIAGNOSIS — E11.9 TYPE 2 DIABETES MELLITUS W/OUT COMPLICATIONS: ICD-10-CM

## 2024-11-08 DIAGNOSIS — R80.9 PROTEINURIA, UNSPECIFIED: ICD-10-CM

## 2024-11-08 LAB
25(OH)D3 SERPL-MCNC: 20.1 NG/ML
ALBUMIN SERPL ELPH-MCNC: 3.7 G/DL
ALP BLD-CCNC: 69 U/L
ALT SERPL-CCNC: 9 U/L
ANION GAP SERPL CALC-SCNC: 13 MMOL/L
AST SERPL-CCNC: 23 U/L
BILIRUB SERPL-MCNC: 0.3 MG/DL
BUN SERPL-MCNC: 62 MG/DL
CALCIUM SERPL-MCNC: 9.5 MG/DL
CALCIUM SERPL-MCNC: 9.5 MG/DL
CHLORIDE SERPL-SCNC: 110 MMOL/L
CHOLEST SERPL-MCNC: 105 MG/DL
CO2 SERPL-SCNC: 22 MMOL/L
CREAT SERPL-MCNC: 7.49 MG/DL
CYSTATIN C SERPL-MCNC: 4.53 MG/L
EGFR: 8 ML/MIN/1.73M2
GFR/BSA.PRED SERPLBLD CYS-BASED-ARV: 11 ML/MIN/1.73M2
GLUCOSE SERPL-MCNC: 138 MG/DL
HBV CORE IGG+IGM SER QL: NONREACTIVE
HBV SURFACE AB SER QL: REACTIVE
HBV SURFACE AG SER QL: NONREACTIVE
HCT VFR BLD CALC: 24.5 %
HCV AB SER QL: NONREACTIVE
HCV S/CO RATIO: 0.09 S/CO
HDLC SERPL-MCNC: 30 MG/DL
HGB BLD-MCNC: 7.8 G/DL
IRON SATN MFR SERPL: 22 %
IRON SERPL-MCNC: 45 UG/DL
LDLC SERPL CALC-MCNC: 61 MG/DL
MAGNESIUM SERPL-MCNC: 2.1 MG/DL
MCHC RBC-ENTMCNC: 31.7 PG
MCHC RBC-ENTMCNC: 31.8 G/DL
MCV RBC AUTO: 99.6 FL
NONHDLC SERPL-MCNC: 75 MG/DL
PARATHYROID HORMONE INTACT: 141 PG/ML
PHOSPHATE SERPL-MCNC: 4.5 MG/DL
PLATELET # BLD AUTO: 165 K/UL
POTASSIUM SERPL-SCNC: 4.4 MMOL/L
PROT SERPL-MCNC: 6.3 G/DL
RBC # BLD: 2.46 M/UL
RBC # FLD: 13.3 %
SODIUM SERPL-SCNC: 144 MMOL/L
TIBC SERPL-MCNC: 203 UG/DL
TRIGL SERPL-MCNC: 68 MG/DL
UIBC SERPL-MCNC: 158 UG/DL
WBC # FLD AUTO: 6.09 K/UL

## 2024-11-08 PROCEDURE — 99214 OFFICE O/P EST MOD 30 MIN: CPT

## 2024-11-08 PROCEDURE — G2211 COMPLEX E/M VISIT ADD ON: CPT | Mod: NC

## 2024-11-08 RX ORDER — EZETIMIBE 10 MG/1
10 TABLET ORAL
Refills: 0 | Status: ACTIVE | COMMUNITY

## 2024-11-08 RX ORDER — CLOPIDOGREL BISULFATE 75 MG/1
75 TABLET, FILM COATED ORAL
Refills: 0 | Status: ACTIVE | COMMUNITY

## 2024-11-08 RX ORDER — ERYTHROPOIETIN 20000 [IU]/ML
20000 INJECTION, SOLUTION INTRAVENOUS; SUBCUTANEOUS
Qty: 6 | Refills: 0 | Status: ACTIVE | COMMUNITY
Start: 2024-11-08 | End: 1900-01-01

## 2024-11-14 ENCOUNTER — APPOINTMENT (OUTPATIENT)
Dept: NEPHROLOGY | Facility: CLINIC | Age: 54
End: 2024-11-14

## 2025-02-03 ENCOUNTER — RX RENEWAL (OUTPATIENT)
Age: 55
End: 2025-02-03

## 2025-02-03 ENCOUNTER — APPOINTMENT (OUTPATIENT)
Dept: NEPHROLOGY | Facility: CLINIC | Age: 55
End: 2025-02-03
Payer: COMMERCIAL

## 2025-02-03 VITALS — HEART RATE: 65 BPM | SYSTOLIC BLOOD PRESSURE: 136 MMHG | DIASTOLIC BLOOD PRESSURE: 64 MMHG

## 2025-02-03 VITALS — HEART RATE: 65 BPM | DIASTOLIC BLOOD PRESSURE: 64 MMHG | SYSTOLIC BLOOD PRESSURE: 136 MMHG

## 2025-02-03 DIAGNOSIS — R80.9 PROTEINURIA, UNSPECIFIED: ICD-10-CM

## 2025-02-03 DIAGNOSIS — N18.5 CHRONIC KIDNEY DISEASE, STAGE 5: ICD-10-CM

## 2025-02-03 DIAGNOSIS — I10 ESSENTIAL (PRIMARY) HYPERTENSION: ICD-10-CM

## 2025-02-03 DIAGNOSIS — E11.9 TYPE 2 DIABETES MELLITUS W/OUT COMPLICATIONS: ICD-10-CM

## 2025-02-03 DIAGNOSIS — E61.1 IRON DEFICIENCY: ICD-10-CM

## 2025-02-03 DIAGNOSIS — E83.39 OTHER DISORDERS OF PHOSPHORUS METABOLISM: ICD-10-CM

## 2025-02-03 DIAGNOSIS — N25.81 SECONDARY HYPERPARATHYROIDISM OF RENAL ORIGIN: ICD-10-CM

## 2025-02-03 DIAGNOSIS — D63.1 CHRONIC KIDNEY DISEASE, STAGE 5: ICD-10-CM

## 2025-02-03 PROCEDURE — 99214 OFFICE O/P EST MOD 30 MIN: CPT

## 2025-02-04 PROBLEM — E83.39 HYPERPHOSPHATEMIA: Status: ACTIVE | Noted: 2025-02-04

## 2025-02-04 RX ORDER — CALCIUM ACETATE 667 MG
667 TABLET ORAL
Qty: 270 | Refills: 1 | Status: ACTIVE | COMMUNITY
Start: 2025-02-04 | End: 1900-01-01

## 2025-03-03 ENCOUNTER — APPOINTMENT (OUTPATIENT)
Dept: NEPHROLOGY | Facility: CLINIC | Age: 55
End: 2025-03-03
Payer: COMMERCIAL

## 2025-03-03 VITALS
SYSTOLIC BLOOD PRESSURE: 130 MMHG | WEIGHT: 200 LBS | DIASTOLIC BLOOD PRESSURE: 68 MMHG | BODY MASS INDEX: 28.7 KG/M2 | HEART RATE: 66 BPM

## 2025-03-03 DIAGNOSIS — I10 ESSENTIAL (PRIMARY) HYPERTENSION: ICD-10-CM

## 2025-03-03 DIAGNOSIS — R80.9 PROTEINURIA, UNSPECIFIED: ICD-10-CM

## 2025-03-03 DIAGNOSIS — E11.9 TYPE 2 DIABETES MELLITUS W/OUT COMPLICATIONS: ICD-10-CM

## 2025-03-03 DIAGNOSIS — Z86.39 PERSONAL HISTORY OF OTHER ENDOCRINE, NUTRITIONAL AND METABOLIC DISEASE: ICD-10-CM

## 2025-03-03 DIAGNOSIS — N18.5 CHRONIC KIDNEY DISEASE, STAGE 5: ICD-10-CM

## 2025-03-03 DIAGNOSIS — D63.1 CHRONIC KIDNEY DISEASE, STAGE 5: ICD-10-CM

## 2025-03-03 PROCEDURE — 99214 OFFICE O/P EST MOD 30 MIN: CPT

## 2025-03-03 PROCEDURE — G2211 COMPLEX E/M VISIT ADD ON: CPT

## 2025-03-14 LAB
ALBUMIN SERPL ELPH-MCNC: 3.9 G/DL
ANION GAP SERPL CALC-SCNC: 14 MMOL/L
BUN SERPL-MCNC: 74 MG/DL
CALCIUM SERPL-MCNC: 9.7 MG/DL
CALCIUM SERPL-MCNC: 9.7 MG/DL
CHLORIDE SERPL-SCNC: 106 MMOL/L
CO2 SERPL-SCNC: 24 MMOL/L
CREAT SERPL-MCNC: 7.59 MG/DL
EGFRCR SERPLBLD CKD-EPI 2021: 8 ML/MIN/1.73M2
ESTIMATED AVERAGE GLUCOSE: 120 MG/DL
GLUCOSE SERPL-MCNC: 164 MG/DL
HBA1C MFR BLD HPLC: 5.8 %
HCT VFR BLD CALC: 28.2 %
HGB BLD-MCNC: 9.1 G/DL
IRON SATN MFR SERPL: 22 %
IRON SERPL-MCNC: 54 UG/DL
MCHC RBC-ENTMCNC: 30.4 PG
MCHC RBC-ENTMCNC: 32.3 G/DL
MCV RBC AUTO: 94.3 FL
PARATHYROID HORMONE INTACT: 252 PG/ML
PHOSPHATE SERPL-MCNC: 6.6 MG/DL
PLATELET # BLD AUTO: 192 K/UL
POTASSIUM SERPL-SCNC: 4.5 MMOL/L
RBC # BLD: 2.99 M/UL
RBC # FLD: 13.7 %
SODIUM SERPL-SCNC: 144 MMOL/L
TIBC SERPL-MCNC: 243 UG/DL
UIBC SERPL-MCNC: 189 UG/DL
WBC # FLD AUTO: 6.04 K/UL

## 2025-05-05 ENCOUNTER — APPOINTMENT (OUTPATIENT)
Dept: NEPHROLOGY | Facility: CLINIC | Age: 55
End: 2025-05-05
Payer: COMMERCIAL

## 2025-05-05 VITALS — HEART RATE: 68 BPM | DIASTOLIC BLOOD PRESSURE: 73 MMHG | SYSTOLIC BLOOD PRESSURE: 148 MMHG

## 2025-05-05 DIAGNOSIS — N25.81 SECONDARY HYPERPARATHYROIDISM OF RENAL ORIGIN: ICD-10-CM

## 2025-05-05 DIAGNOSIS — R80.9 PROTEINURIA, UNSPECIFIED: ICD-10-CM

## 2025-05-05 DIAGNOSIS — E79.0 HYPERURICEMIA W/OUT SIGNS OF INFLAMMATORY ARTHRITIS AND TOPHACEOUS DISEASE: ICD-10-CM

## 2025-05-05 DIAGNOSIS — N18.5 CHRONIC KIDNEY DISEASE, STAGE 5: ICD-10-CM

## 2025-05-05 DIAGNOSIS — I10 ESSENTIAL (PRIMARY) HYPERTENSION: ICD-10-CM

## 2025-05-05 DIAGNOSIS — D63.1 CHRONIC KIDNEY DISEASE, STAGE 5: ICD-10-CM

## 2025-05-05 DIAGNOSIS — E11.9 TYPE 2 DIABETES MELLITUS W/OUT COMPLICATIONS: ICD-10-CM

## 2025-05-05 DIAGNOSIS — E83.39 OTHER DISORDERS OF PHOSPHORUS METABOLISM: ICD-10-CM

## 2025-05-05 DIAGNOSIS — E61.1 IRON DEFICIENCY: ICD-10-CM

## 2025-05-05 PROCEDURE — 99214 OFFICE O/P EST MOD 30 MIN: CPT

## 2025-05-06 LAB
ALBUMIN SERPL ELPH-MCNC: 4.2 G/DL
ANION GAP SERPL CALC-SCNC: 18 MMOL/L
BUN SERPL-MCNC: 76 MG/DL
CALCIUM SERPL-MCNC: 9.4 MG/DL
CALCIUM SERPL-MCNC: 9.4 MG/DL
CHLORIDE SERPL-SCNC: 105 MMOL/L
CO2 SERPL-SCNC: 19 MMOL/L
CREAT SERPL-MCNC: 8.1 MG/DL
CYSTATIN C SERPL-MCNC: 4.35 MG/L
EGFRCR SERPLBLD CKD-EPI 2021: 7 ML/MIN/1.73M2
ESTIMATED AVERAGE GLUCOSE: 103 MG/DL
GFR/BSA.PRED SERPLBLD CYS-BASED-ARV: 11 ML/MIN/1.73M2
GLUCOSE SERPL-MCNC: 135 MG/DL
HBA1C MFR BLD HPLC: 5.2 %
HCT VFR BLD CALC: 25.4 %
HGB BLD-MCNC: 8.3 G/DL
IRON SATN MFR SERPL: 16 %
IRON SERPL-MCNC: 46 UG/DL
MCHC RBC-ENTMCNC: 31.8 PG
MCHC RBC-ENTMCNC: 32.7 G/DL
MCV RBC AUTO: 97.3 FL
PARATHYROID HORMONE INTACT: 254 PG/ML
PHOSPHATE SERPL-MCNC: 5.6 MG/DL
PLATELET # BLD AUTO: 217 K/UL
POTASSIUM SERPL-SCNC: 4 MMOL/L
RBC # BLD: 2.61 M/UL
RBC # FLD: 13.7 %
SODIUM SERPL-SCNC: 143 MMOL/L
TIBC SERPL-MCNC: 281 UG/DL
UIBC SERPL-MCNC: 235 UG/DL
URATE SERPL-MCNC: 9.9 MG/DL
WBC # FLD AUTO: 7.03 K/UL

## 2025-05-20 NOTE — OCCUPATIONAL THERAPY INITIAL EVALUATION ADULT - DIAGNOSIS, OT EVAL
Abdomen , soft, nontender, nondistended, normal bowel sounds Pt demonstrates at baseline/WFL (IND) for ADLs and functional mobility.

## 2025-06-09 ENCOUNTER — APPOINTMENT (OUTPATIENT)
Dept: INFUSION THERAPY | Facility: CLINIC | Age: 55
End: 2025-06-09

## 2025-06-16 ENCOUNTER — APPOINTMENT (OUTPATIENT)
Dept: INFUSION THERAPY | Facility: CLINIC | Age: 55
End: 2025-06-16

## 2025-07-14 ENCOUNTER — APPOINTMENT (OUTPATIENT)
Dept: NEPHROLOGY | Facility: CLINIC | Age: 55
End: 2025-07-14
Payer: COMMERCIAL

## 2025-07-14 VITALS — SYSTOLIC BLOOD PRESSURE: 123 MMHG | HEART RATE: 60 BPM | DIASTOLIC BLOOD PRESSURE: 75 MMHG

## 2025-07-14 PROCEDURE — 99214 OFFICE O/P EST MOD 30 MIN: CPT

## 2025-07-15 RX ORDER — CALCITRIOL 0.25 UG/1
0.25 CAPSULE, LIQUID FILLED ORAL
Qty: 90 | Refills: 0 | Status: ACTIVE | COMMUNITY
Start: 2025-06-02

## 2025-07-15 RX ORDER — AMLODIPINE BESYLATE 5 MG/1
5 TABLET ORAL
Qty: 60 | Refills: 0 | Status: ACTIVE | COMMUNITY
Start: 2025-05-25

## 2025-07-15 RX ORDER — CARVEDILOL 6.25 MG/1
6.25 TABLET, FILM COATED ORAL
Qty: 180 | Refills: 0 | Status: ACTIVE | COMMUNITY
Start: 2025-06-09

## 2025-07-15 RX ORDER — MYCOPHENOLATE MOFETIL 500 MG/1
500 TABLET ORAL
Qty: 60 | Refills: 0 | Status: ACTIVE | COMMUNITY
Start: 2025-06-23

## 2025-07-15 RX ORDER — ATORVASTATIN CALCIUM 40 MG/1
40 TABLET, FILM COATED ORAL
Qty: 30 | Refills: 0 | Status: ACTIVE | COMMUNITY
Start: 2025-06-23

## 2025-07-15 RX ORDER — CHLORHEXIDINE GLUCONATE 4 %
400 (240 MG) LIQUID (ML) TOPICAL
Qty: 180 | Refills: 0 | Status: ACTIVE | COMMUNITY
Start: 2025-06-18

## 2025-07-15 RX ORDER — REPAGLINIDE 0.5 MG/1
0.5 TABLET ORAL
Qty: 90 | Refills: 0 | Status: ACTIVE | COMMUNITY
Start: 2025-05-23

## 2025-07-15 RX ORDER — TACROLIMUS 1 MG/1
1 CAPSULE ORAL
Qty: 240 | Refills: 0 | Status: ACTIVE | COMMUNITY
Start: 2025-06-17

## 2025-07-16 RX ORDER — ATOVAQUONE 750 MG/5ML
750 SUSPENSION ORAL
Qty: 300 | Refills: 0 | Status: DISCONTINUED | COMMUNITY
Start: 2025-07-09

## 2025-07-16 RX ORDER — SODIUM BICARBONATE 650 MG/1
650 TABLET ORAL
Qty: 180 | Refills: 0 | Status: DISCONTINUED | COMMUNITY
Start: 2025-06-02 | End: 2025-07-16

## 2025-07-16 RX ORDER — CLOTRIMAZOLE 10 MG/1
10 LOZENGE ORAL
Qty: 60 | Refills: 0 | Status: ACTIVE | COMMUNITY
Start: 2025-06-17

## 2025-07-16 RX ORDER — VALGANCICLOVIR HYDROCHLORIDE 450 MG/1
450 TABLET ORAL
Refills: 0 | Status: ACTIVE | COMMUNITY

## 2025-07-16 RX ORDER — VITAMIN K2 90 MCG
1000 CAPSULE ORAL
Refills: 0 | Status: ACTIVE | COMMUNITY